# Patient Record
Sex: MALE | Race: WHITE
[De-identification: names, ages, dates, MRNs, and addresses within clinical notes are randomized per-mention and may not be internally consistent; named-entity substitution may affect disease eponyms.]

---

## 2017-07-25 ENCOUNTER — HOSPITAL ENCOUNTER (EMERGENCY)
Dept: HOSPITAL 47 - EC | Age: 67
Discharge: HOME | End: 2017-07-25
Payer: MEDICARE

## 2017-07-25 VITALS — DIASTOLIC BLOOD PRESSURE: 67 MMHG | HEART RATE: 52 BPM | TEMPERATURE: 97.9 F | SYSTOLIC BLOOD PRESSURE: 105 MMHG

## 2017-07-25 VITALS — RESPIRATION RATE: 18 BRPM

## 2017-07-25 DIAGNOSIS — R61: ICD-10-CM

## 2017-07-25 DIAGNOSIS — R07.81: ICD-10-CM

## 2017-07-25 DIAGNOSIS — M25.552: Primary | ICD-10-CM
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
## 2017-07-25 DIAGNOSIS — M47.812: ICD-10-CM
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
## 2017-07-25 DIAGNOSIS — R09.89: ICD-10-CM

## 2017-07-25 DIAGNOSIS — R50.9: ICD-10-CM

## 2017-07-25 DIAGNOSIS — F17.200: ICD-10-CM

## 2017-07-25 DIAGNOSIS — R05: ICD-10-CM

## 2017-07-25 LAB
ALP SERPL-CCNC: 54 U/L (ref 38–126)
ALT SERPL-CCNC: 33 U/L (ref 21–72)
ANION GAP SERPL CALC-SCNC: 9 MMOL/L
AST SERPL-CCNC: 26 U/L (ref 17–59)
BASOPHILS # BLD AUTO: 0 K/UL (ref 0–0.2)
BASOPHILS NFR BLD AUTO: 0 %
BUN SERPL-SCNC: 14 MG/DL (ref 9–20)
CALCIUM SPEC-MCNC: 7.9 MG/DL (ref 8.4–10.2)
CH: 38.3
CHCM: 32.1
CHLORIDE SERPL-SCNC: 105 MMOL/L (ref 98–107)
CO2 SERPL-SCNC: 23 MMOL/L (ref 22–30)
EOSINOPHIL # BLD AUTO: 0.1 K/UL (ref 0–0.7)
EOSINOPHIL NFR BLD AUTO: 2 %
ERYTHROCYTE [DISTWIDTH] IN BLOOD BY AUTOMATED COUNT: 1.91 M/UL (ref 4.3–5.9)
ERYTHROCYTE [DISTWIDTH] IN BLOOD: 20.8 % (ref 11.5–15.5)
GLUCOSE SERPL-MCNC: 103 MG/DL (ref 74–99)
HCT VFR BLD AUTO: 23 % (ref 39–53)
HDW: 2.93
HGB BLD-MCNC: 7.5 GM/DL (ref 13–17.5)
KETONES UR QL STRIP.AUTO: (no result)
LUC NFR BLD AUTO: 1 %
LYMPHOCYTES # SPEC AUTO: 1.1 K/UL (ref 1–4.8)
LYMPHOCYTES NFR SPEC AUTO: 15 %
MCH RBC QN AUTO: 39 PG (ref 25–35)
MCHC RBC AUTO-ENTMCNC: 32.5 G/DL (ref 31–37)
MCV RBC AUTO: 120.1 FL (ref 80–100)
MONOCYTES # BLD AUTO: 0.3 K/UL (ref 0–1)
MONOCYTES NFR BLD AUTO: 4 %
NEUTROPHILS # BLD AUTO: 5.6 K/UL (ref 1.3–7.7)
NEUTROPHILS NFR BLD AUTO: 77 %
NON-AFRICAN AMERICAN GFR(MDRD): >60
PH UR: 5 [PH] (ref 5–8)
POTASSIUM SERPL-SCNC: 4.3 MMOL/L (ref 3.5–5.1)
PROT SERPL-MCNC: 6.2 G/DL (ref 6.3–8.2)
SODIUM SERPL-SCNC: 137 MMOL/L (ref 137–145)
SP GR UR: 1.01 (ref 1–1.03)
UA BILLING (MACRO VS. MICRO): (no result)
UROBILINOGEN UR QL STRIP: <2 MG/DL (ref ?–2)
WBC # BLD AUTO: 0.1 10*3/UL
WBC # BLD AUTO: 7.2 K/UL (ref 3.8–10.6)
WBC (PEROX): 7.2

## 2017-07-25 PROCEDURE — 96361 HYDRATE IV INFUSION ADD-ON: CPT

## 2017-07-25 PROCEDURE — 36415 COLL VENOUS BLD VENIPUNCTURE: CPT

## 2017-07-25 PROCEDURE — 81003 URINALYSIS AUTO W/O SCOPE: CPT

## 2017-07-25 PROCEDURE — 96374 THER/PROPH/DIAG INJ IV PUSH: CPT

## 2017-07-25 PROCEDURE — 96375 TX/PRO/DX INJ NEW DRUG ADDON: CPT

## 2017-07-25 PROCEDURE — 96372 THER/PROPH/DIAG INJ SC/IM: CPT

## 2017-07-25 PROCEDURE — 72050 X-RAY EXAM NECK SPINE 4/5VWS: CPT

## 2017-07-25 PROCEDURE — 73502 X-RAY EXAM HIP UNI 2-3 VIEWS: CPT

## 2017-07-25 PROCEDURE — 85025 COMPLETE CBC W/AUTO DIFF WBC: CPT

## 2017-07-25 PROCEDURE — 87086 URINE CULTURE/COLONY COUNT: CPT

## 2017-07-25 PROCEDURE — 99285 EMERGENCY DEPT VISIT HI MDM: CPT

## 2017-07-25 PROCEDURE — 71101 X-RAY EXAM UNILAT RIBS/CHEST: CPT

## 2017-07-25 PROCEDURE — 70450 CT HEAD/BRAIN W/O DYE: CPT

## 2017-07-25 PROCEDURE — 80053 COMPREHEN METABOLIC PANEL: CPT

## 2017-07-25 PROCEDURE — 72170 X-RAY EXAM OF PELVIS: CPT

## 2017-07-25 PROCEDURE — 99284 EMERGENCY DEPT VISIT MOD MDM: CPT

## 2017-07-25 NOTE — XR
EXAMINATION TYPE: XR ribs LT w pa chest xray

 

DATE OF EXAM: 7/25/2017

 

COMPARISON: NONE

 

HISTORY: Pain after injury

 

TECHNIQUE: 6 views

 

FINDINGS: The study is negative for fracture or pneumothorax or pleural effusion. No incidental findi
ngs.

 

IMPRESSION: No acute process.

## 2017-07-25 NOTE — ED
Fever HPI





- General


Stated Complaint: Fall


Time Seen by Provider: 07/25/17 19:41





- History of Present Illness


Initial Comments: 





Chief complaint and history of present illness this is a 67-year-old male 

brought emergency room by embolus.  The patient was at home he was on a ladder 

he fell approximately 6 feet onto his left side area.  Complains discomfort to 

the left lower lateral rib cage and his left hip pelvic region.  Denies any 

loss of consciousness.  Patient had his cervical collar removed because it was 

irritating him palpation in the neck was negative for pain patient demonstrated 

there was able to flex and extend without discomfort.  The patient also was 

found to have a temperature of 100 with a productive cough.





- Related Data


 Home Medications











 Medication  Instructions  Recorded  Confirmed


 


Unknown Gout Medication 1 tab PO DAILY PRN 07/25/17 07/25/17








 Previous Rx's











 Medication  Instructions  Recorded


 


Hydrocodone/Acetaminophen [Norco 1 each PO Q6HR PRN #10 tab 07/25/17





5-325]  











 Allergies











Allergy/AdvReac Type Severity Reaction Status Date / Time


 


No Known Allergies Allergy   Verified 07/25/17 19:50














Review of Systems


ROS Statement: 


Those systems with pertinent positive or pertinent negative responses have been 

documented in the HPI.


Review of systems no visual acuity changes no headache or neck pain.  Denies 

collarbone pain or shoulder pain.  He has discomfort to his left lower lateral 

rib cage.  Also productive cough.  Temperature today is 100.  No abdominal 

pain.  He has discomfort to the left groin area.  He keeps his left leg flexed 

at the hip.  No neuro deficits.  All systems are reviewed.  Past medical 

problems denies any.  Denies any surgeries of the right foot surgery for 

partial activation.  Family history mother had cancer of unknown type.  Patient 

does smoke does drink denies any ALLERGIES.








ROS Other: All systems not noted in ROS Statement are negative.





Past Medical History


Past Medical History: Skin Disorder


Additional Past Medical History / Comment(s): Gout, cellulitis left leg-on rx 

and clearing up, anemia,


History of Any Multi-Drug Resistant Organisms: None Reported


Past Surgical History: No Surgical Hx Reported


Additional Past Surgical History / Comment(s): surgery on rt leg and foot after 

injury age 3


Past Anesthesia/Blood Transfusion Reactions: No Reported Reaction


Past Psychological History: No Psychological Hx Reported


Smoking Status: Current every day smoker


Past Alcohol Use History: Daily


Additional Past Alcohol Use History / Comment(s): has smoked for approx 50 yrs- 

2 PPD about per wife


Past Drug Use History: None Reported





- Past Family History


  ** Mother


Family Medical History: Cancer





  ** Brother(s)


Family Medical History: Cancer





General Exam





- General Exam Comments


Initial Comments: 





General:


The patient is awake and alert, arrived via EMS after falling approximately 6 

feet.  No loss of consciousness.  He is not on blood thinners.  The patient 

complains of pain to his left lower lateral rib cage and left hip area.  Temp 

warm 1.2 pulse 75 respiratory rate 18 pulse ox 95% room air blood pressure 136/

64.


Eye:


Pupils are equal, round and reactive to light, extra-ocular movements are intact

; there is normal conjunctiva bilaterally. No signs of icterus. 


Ears, nose, mouth and throat:


There are moist mucous membranes and no oral lesions.  Poor dentition


Neck:


The neck is supple, there is no tenderness.


Cardiovascular:


There is a regular rate and rhythm. No murmur, rub or gallop is appreciated.


Respiratory:


Patient has a fever productive cough starting today.  Patient's heavy smoker.  

Rales appreciated..  Left lateral lower chest wall pain with movement palpation 

and breathing.


Gastrointestinal:


Soft, non-distended, non-tender abdomen without masses or organomegaly noted. 

There is no rebound or guarding present. No CVA tenderness. Bowel sounds are 

unremarkable.


Back:


There is no tenderness to palpation in the midline. There is no obvious 

deformity. No rashes noted. 


Musculoskeletal:


Normal ROM, no tenderness, There is no pedal edema. There is no calf tenderness 

or swelling. Sensation intact. Pulses equal bilaterally 2+.  Partial dictation 

right foot


Neurological:


CN II-XII intact, There are no obvious motor or sensory deficits. Coordination 

appears grossly intact. Speech is normal.  No focal or lateralizing findings


Skin:


Skin is warm and dry and no rashes or lesions are noted. 


 





Course


 Vital Signs











  07/25/17 07/25/17 07/25/17





  19:39 20:53 21:17


 


Temperature 101.2 F H 99.2 F 


 


Pulse Rate 75 62 58 L


 


Respiratory 18 18 18





Rate   


 


Blood Pressure 136/64 128/60 148/72


 


O2 Sat by Pulse 95 97 93 L





Oximetry   














  07/25/17





  22:56


 


Temperature 97.9 F


 


Pulse Rate 52 L


 


Respiratory 18





Rate 


 


Blood Pressure 105/67


 


O2 Sat by Pulse 99





Oximetry 














Medical Decision Making





- Medical Decision Making





Medical decision making;





X-rays of cervical spine were done and reviewed radiologist his findings are 

negative for fracture or malalignment.  Prominent multilevel cervical 

spondylosis changes appreciated.  Impression; no acute process.  As read by Dr. Master Ba





X-ray of the left hip was done and reviewed by radiologist his findings are 

bones and joints and soft tissues are unremarkable.  Impression no acute 

process.  As read by Dr. Master Ba





X-ray of the pelvis was done and reviewed by radiologist his impression is 

bones and joints and soft tissues appear negative for acute injury.  Impression 

no acute process.  As read by Dr. Master Ba





X-ray of the chest and left rib series were done and reviewed by radiologist 

his findings are the study is negative for fracture or pneumothorax or pleural 

effusion.  No incidental findings.  Impression no acute process.  As read by 

Dr. Master Ba





CT the brain was done and reviewed by radiologist his findings are there is no 

acute intracranial hemorrhage, mass effect, or midline shift identified.  The 

ventricles and sulci are within normal limits in size.  The globes are intact 

and the visualized sinuses are clear.  Impression no acute process.  As read by 

Dr. Master Ba








Patient became mildly diaphoretic after IV Dilaudid.  He states she's had a 

before he had no reaction to the morphine initially.  He was given Zofran.





Patient states he is feeling better.  He is able to rotate his hip but with   

discomfort.  The patient received 1 L of fluid so far, he'll receive another 

half liter.








The patient's labs show white count 7.2 hemoglobin 7.5 hematocrit 23 and 

platelets 255.  Potassium 4.3.  BUN 14 creatinine 0.8 GFR greater than 60.  

Glucose 103.  The patient was surprised to hear his hemoglobin was low denies 

any source of bleeding.  Hemoccult will be sent.





I discussed with the patient has hemoglobin of 7.5.  Family reports they've 

been to an oncologist for some problems with hemochromatosis.  He'll follow-up.

  I did a rectal exam prostate felt normal.  There was no stool in the vault to 

check for stool guaiac.  Patient denies seeing any dark or black or red stool 

per rectum.





- Lab Data


Result diagrams: 


 07/25/17 22:14





 07/25/17 22:14


 Lab Results











  07/25/17 07/25/17 Range/Units





  22:14 22:14 


 


WBC  7.2   (3.8-10.6)  k/uL


 


RBC  1.91 L   (4.30-5.90)  m/uL


 


Hgb  7.5 L   (13.0-17.5)  gm/dL


 


Hct  23.0 L   (39.0-53.0)  %


 


MCV  120.1 H   (80.0-100.0)  fL


 


MCH  39.0 H   (25.0-35.0)  pg


 


MCHC  32.5   (31.0-37.0)  g/dL


 


RDW  20.8 H   (11.5-15.5)  %


 


Plt Count  255   (150-450)  k/uL


 


Neutrophils %  77   %


 


Lymphocytes %  15   %


 


Monocytes %  4   %


 


Eosinophils %  2   %


 


Basophils %  0   %


 


Neutrophils #  5.6   (1.3-7.7)  k/uL


 


Lymphocytes #  1.1   (1.0-4.8)  k/uL


 


Monocytes #  0.3   (0-1.0)  k/uL


 


Eosinophils #  0.1   (0-0.7)  k/uL


 


Basophils #  0.0   (0-0.2)  k/uL


 


Manual Slide Review  Performed   


 


Hypochromasia  Slight   


 


Anisocytosis  Moderate   


 


Macrocytosis  Marked   


 


Target Cells  Present   


 


Tear Drop Cells  Present   


 


Ovalocytes  Present   


 


Fragmented RBCs  Present   


 


Sodium   137  (137-145)  mmol/L


 


Potassium   4.3  (3.5-5.1)  mmol/L


 


Chloride   105  ()  mmol/L


 


Carbon Dioxide   23  (22-30)  mmol/L


 


Anion Gap   9  mmol/L


 


BUN   14  (9-20)  mg/dL


 


Creatinine   0.80  (0.66-1.25)  mg/dL


 


Est GFR (MDRD) Af Amer   >60  (>60 ml/min/1.73 sqM)  


 


Est GFR (MDRD) Non-Af   >60  (>60 ml/min/1.73 sqM)  


 


Glucose   103 H  (74-99)  mg/dL


 


Calcium   7.9 L  (8.4-10.2)  mg/dL


 


Total Bilirubin   1.1  (0.2-1.3)  mg/dL


 


AST   26  (17-59)  U/L


 


ALT   33  (21-72)  U/L


 


Alkaline Phosphatase   54  ()  U/L


 


Total Protein   6.2 L  (6.3-8.2)  g/dL


 


Albumin   3.3 L  (3.5-5.0)  g/dL














Disposition


Clinical Impression: 


 Fall





Disposition: HOME SELF-CARE


Condition: Stable


Instructions:  Fall Prevention for Older Adults (ED), Rib Contusion (ED), 

Contusion in Adults (ED), Hip Contusion (ED)


Additional Instructions: 


Take Tylenol and/or Norco for pain.  May need repeat x-rays if pain persists 

for a week to 10 days.  Follow-up with your oncologist near family doctor 

concerning here blood picture.


Prescriptions: 


Hydrocodone/Acetaminophen [Norco 5-325] 1 each PO Q6HR PRN #10 tab


 PRN Reason: Pain


Referrals: 


Garcia Luu DO [Primary Care Provider] - 1-2 days


Time of Disposition: 23:23

## 2017-07-25 NOTE — XR
EXAMINATION TYPE: XR cervical spine comp

 

DATE OF EXAM: 7/25/2017

 

COMPARISON: NONE

 

HISTORY: Pain after injury

 

TECHNIQUE: 5 views

 

FINDINGS: Negative for fracture or malalignment. Prominent multilevel cervical spondylosis changes ap
preciated

 

IMPRESSION: No acute process.

## 2017-07-25 NOTE — CT
EXAMINATION TYPE: CT brain wo con

 

DATE OF EXAM: 7/25/2017

 

COMPARISON: NONE

 

HISTORY: Fall injury today.

 

CT DLP: 1010.6 mGycm

Automated exposure control for dose reduction was used.

 

FINDINGS: 

There is no acute intracranial hemorrhage, mass effect, or midline shift identified.  The ventricles 
and sulci are within normal limits in size.  The globes are intact and the visualized sinuses are maite
ar.

 

IMPRESSION: 

NO ACUTE PROCESS.

## 2017-07-25 NOTE — XR
EXAMINATION TYPE: XR pelvis AP view

 

DATE OF EXAM: 7/25/2017

 

COMPARISON: NONE

 

HISTORY: Pain after injury

 

TECHNIQUE: One view

 

FINDINGS: Bones and joints and soft tissues appear negative for acute injury.

 

IMPRESSION: No acute process.

## 2017-07-25 NOTE — XR
EXAMINATION TYPE: XR Hip Complete LT

 

DATE OF EXAM: 7/25/2017

 

COMPARISON: NONE

 

HISTORY: Pain after injury

 

TECHNIQUE: 2 views

 

FINDINGS: Bones and joints and soft tissues are unremarkable.

 

IMPRESSION: No acute process.

## 2020-03-19 ENCOUNTER — HOSPITAL ENCOUNTER (OUTPATIENT)
Dept: HOSPITAL 47 - RADCTMAIN | Age: 70
Discharge: HOME | End: 2020-03-19
Attending: FAMILY MEDICINE
Payer: MEDICARE

## 2020-03-19 DIAGNOSIS — G93.2: ICD-10-CM

## 2020-03-19 DIAGNOSIS — R90.89: ICD-10-CM

## 2020-03-19 DIAGNOSIS — G31.1: Primary | ICD-10-CM

## 2020-03-19 DIAGNOSIS — R42: ICD-10-CM

## 2020-03-19 LAB — BUN SERPL-SCNC: 18 MG/DL (ref 9–20)

## 2020-03-19 PROCEDURE — 84520 ASSAY OF UREA NITROGEN: CPT

## 2020-03-19 PROCEDURE — 36415 COLL VENOUS BLD VENIPUNCTURE: CPT

## 2020-03-19 PROCEDURE — 82565 ASSAY OF CREATININE: CPT

## 2020-03-19 PROCEDURE — 70470 CT HEAD/BRAIN W/O & W/DYE: CPT

## 2020-03-19 NOTE — CT
EXAMINATION TYPE: CT brain wo/w con

 

DATE OF EXAM: 3/19/2020

 

COMPARISON: CT brain dated 7/25/2017

 

HISTORY: Dizziness and pounding in eyes. Vertigo.

 

CT DLP: 2128.6 mGycm  Automated Exposure Control for Dose Reduction was Utilized.

 

 

TECHNIQUE: CT scan of the head is performed with IV contrast.,CT scan of the head is performed withou
t and with without and with IV Contrast, patient injected with 100 mL of Isovue 300.

 

FINDINGS:   Noncontrast images show no acute intracranial hemorrhage or midline shift. The ventricles
 and sulci are symmetrically prominent compatible with age-related volume loss. Incidentally noted pa
rtially empty sella turcica. The globes are symmetric. Extraocular muscles are also symmetric. No eng
orgement of the superior ophthalmic veins. Lenses are in place. Postcontrast images show no suspiciou
s enhancing intraparenchymal mass. Scant mucosal thickening of the right maxillary sinus is seen. Rem
aining paranasal sinuses and mastoid air cells are well aerated. Osseous demineralization seen.

 

IMPRESSION:

1. No abnormal intracranial enhancement seen. Age-related cerebral atrophy is present. No evidence of
 severe sinusitis or mastoiditis in this patient with vertigo. Scant mucosal thickening of the right 
maxillary sinus is seen.

2. Partially empty sella turcica, but is commonly seen with age. However MRI could evaluate for other
 evidence of increased intracranial pressure.  Correlate with ophthalmologic exam. MRI could also pro
vide increased sensitivity for underlying white matter change.

## 2020-06-19 ENCOUNTER — HOSPITAL ENCOUNTER (INPATIENT)
Dept: HOSPITAL 47 - EC | Age: 70
LOS: 6 days | Discharge: HOME HEALTH | DRG: 808 | End: 2020-06-25
Attending: INTERNAL MEDICINE | Admitting: INTERNAL MEDICINE
Payer: MEDICARE

## 2020-06-19 VITALS — BODY MASS INDEX: 17.7 KG/M2

## 2020-06-19 DIAGNOSIS — R64: ICD-10-CM

## 2020-06-19 DIAGNOSIS — Z66: ICD-10-CM

## 2020-06-19 DIAGNOSIS — F10.10: ICD-10-CM

## 2020-06-19 DIAGNOSIS — R50.81: ICD-10-CM

## 2020-06-19 DIAGNOSIS — D63.8: ICD-10-CM

## 2020-06-19 DIAGNOSIS — T45.1X5A: ICD-10-CM

## 2020-06-19 DIAGNOSIS — Z20.828: ICD-10-CM

## 2020-06-19 DIAGNOSIS — E44.0: ICD-10-CM

## 2020-06-19 DIAGNOSIS — Z87.39: ICD-10-CM

## 2020-06-19 DIAGNOSIS — Z91.19: ICD-10-CM

## 2020-06-19 DIAGNOSIS — D46.1: ICD-10-CM

## 2020-06-19 DIAGNOSIS — Z98.890: ICD-10-CM

## 2020-06-19 DIAGNOSIS — D61.810: Primary | ICD-10-CM

## 2020-06-19 DIAGNOSIS — F17.210: ICD-10-CM

## 2020-06-19 DIAGNOSIS — J18.9: ICD-10-CM

## 2020-06-19 DIAGNOSIS — Z80.9: ICD-10-CM

## 2020-06-19 DIAGNOSIS — Z51.5: ICD-10-CM

## 2020-06-19 LAB
ALBUMIN SERPL-MCNC: 3.3 G/DL (ref 3.5–5)
ALP SERPL-CCNC: 76 U/L (ref 38–126)
ALT SERPL-CCNC: 46 U/L (ref 4–49)
ANION GAP SERPL CALC-SCNC: 7 MMOL/L
AST SERPL-CCNC: 52 U/L (ref 17–59)
BUN SERPL-SCNC: 18 MG/DL (ref 9–20)
CALCIUM SPEC-MCNC: 8 MG/DL (ref 8.4–10.2)
CHLORIDE SERPL-SCNC: 99 MMOL/L (ref 98–107)
CO2 SERPL-SCNC: 26 MMOL/L (ref 22–30)
ERYTHROCYTE [DISTWIDTH] IN BLOOD BY AUTOMATED COUNT: 2.05 M/UL (ref 4.3–5.9)
ERYTHROCYTE [DISTWIDTH] IN BLOOD: 25.5 % (ref 11.5–15.5)
GLUCOSE SERPL-MCNC: 100 MG/DL (ref 74–99)
HCT VFR BLD AUTO: 21 % (ref 39–53)
HGB BLD-MCNC: 7.3 GM/DL (ref 13–17.5)
MCH RBC QN AUTO: 35.5 PG (ref 25–35)
MCHC RBC AUTO-ENTMCNC: 34.7 G/DL (ref 31–37)
MCV RBC AUTO: 102.4 FL (ref 80–100)
PH UR: 5.5 [PH] (ref 5–8)
PLATELET # BLD AUTO: 16 K/UL (ref 150–450)
POTASSIUM SERPL-SCNC: 4.1 MMOL/L (ref 3.5–5.1)
PROT SERPL-MCNC: 7.5 G/DL (ref 6.3–8.2)
RBC UR QL: 10 /HPF (ref 0–5)
SODIUM SERPL-SCNC: 132 MMOL/L (ref 137–145)
SP GR UR: 1.02 (ref 1–1.03)
UROBILINOGEN UR QL STRIP: 2 MG/DL (ref ?–2)
WBC # BLD AUTO: 0.9 K/UL (ref 3.8–10.6)
WBC # UR AUTO: 1 /HPF (ref 0–5)

## 2020-06-19 PROCEDURE — 86850 RBC ANTIBODY SCREEN: CPT

## 2020-06-19 PROCEDURE — 84145 PROCALCITONIN (PCT): CPT

## 2020-06-19 PROCEDURE — 86900 BLOOD TYPING SEROLOGIC ABO: CPT

## 2020-06-19 PROCEDURE — 96368 THER/DIAG CONCURRENT INF: CPT

## 2020-06-19 PROCEDURE — 85610 PROTHROMBIN TIME: CPT

## 2020-06-19 PROCEDURE — 36415 COLL VENOUS BLD VENIPUNCTURE: CPT

## 2020-06-19 PROCEDURE — 36430 TRANSFUSION BLD/BLD COMPNT: CPT

## 2020-06-19 PROCEDURE — 85730 THROMBOPLASTIN TIME PARTIAL: CPT

## 2020-06-19 PROCEDURE — 71046 X-RAY EXAM CHEST 2 VIEWS: CPT

## 2020-06-19 PROCEDURE — 87040 BLOOD CULTURE FOR BACTERIA: CPT

## 2020-06-19 PROCEDURE — 86140 C-REACTIVE PROTEIN: CPT

## 2020-06-19 PROCEDURE — 93005 ELECTROCARDIOGRAM TRACING: CPT

## 2020-06-19 PROCEDURE — 84100 ASSAY OF PHOSPHORUS: CPT

## 2020-06-19 PROCEDURE — 83605 ASSAY OF LACTIC ACID: CPT

## 2020-06-19 PROCEDURE — 86901 BLOOD TYPING SEROLOGIC RH(D): CPT

## 2020-06-19 PROCEDURE — 99291 CRITICAL CARE FIRST HOUR: CPT

## 2020-06-19 PROCEDURE — 96365 THER/PROPH/DIAG IV INF INIT: CPT

## 2020-06-19 PROCEDURE — 80202 ASSAY OF VANCOMYCIN: CPT

## 2020-06-19 PROCEDURE — 83735 ASSAY OF MAGNESIUM: CPT

## 2020-06-19 PROCEDURE — 87449 NOS EACH ORGANISM AG IA: CPT

## 2020-06-19 PROCEDURE — 96367 TX/PROPH/DG ADDL SEQ IV INF: CPT

## 2020-06-19 PROCEDURE — 86920 COMPATIBILITY TEST SPIN: CPT

## 2020-06-19 PROCEDURE — 80053 COMPREHEN METABOLIC PANEL: CPT

## 2020-06-19 PROCEDURE — 84550 ASSAY OF BLOOD/URIC ACID: CPT

## 2020-06-19 PROCEDURE — 86880 COOMBS TEST DIRECT: CPT

## 2020-06-19 PROCEDURE — 85027 COMPLETE CBC AUTOMATED: CPT

## 2020-06-19 PROCEDURE — 83615 LACTATE (LD) (LDH) ENZYME: CPT

## 2020-06-19 PROCEDURE — 74177 CT ABD & PELVIS W/CONTRAST: CPT

## 2020-06-19 PROCEDURE — 81001 URINALYSIS AUTO W/SCOPE: CPT

## 2020-06-19 PROCEDURE — 85025 COMPLETE CBC W/AUTO DIFF WBC: CPT

## 2020-06-19 PROCEDURE — 82565 ASSAY OF CREATININE: CPT

## 2020-06-19 RX ADMIN — CEFAZOLIN SCH MLS/HR: 330 INJECTION, POWDER, FOR SOLUTION INTRAMUSCULAR; INTRAVENOUS at 21:37

## 2020-06-19 NOTE — XR
EXAMINATION TYPE: XR chest 2V

 

DATE OF EXAM: 6/19/2020

 

COMPARISON: 7/25/2017

 

HISTORY: 70-year-old male with fever, shortness of breath, weakness

 

TECHNIQUE:  PA and lateral views

 

FINDINGS:  

Heart upper limits of normal in size. Some pleural-based calcifications are suggested particularly ov
erlying the hemidiaphragms. Vague patches of hazy densities may relate to additional pleural calcific
ations. Hyperinflation with flattening of the hemidiaphragms. No definite consolidation or pleural ef
fusion.

 

 

IMPRESSION:  

COPD. Pleural calcifications suggesting prior asbestosis exposure. Vague patches of hazy density coul
d represent subtle groundglass infiltrate such as relating to interstitial pneumonitis or atypical pn
eumonias. Clinically correlate.

## 2020-06-19 NOTE — ED
General Adult HPI





- General


Chief complaint: Weakness


Stated complaint: weakness


Time Seen by Provider: 20 19:22


Source: patient, EMS, RN notes reviewed, old records reviewed


Mode of arrival: EMS


Limitations: no limitations





- History of Present Illness


Initial comments: 





70-year-old male presenting for evaluation of fever, generalized weakness.  Ernst guzmán received a transfusion this morning at approximately 9 AM.  He was noted 

to have a fever this afternoon.  Brought to the emergency department for 

evaluation.  Patient is currently receiving chemotherapy for cancer, uncertain 

what type of cancer this patient has periods most recent chemo was approximately

2 weeks ago.  He denies pain complaints.  Denies vomiting or diarrhea.  Denies 

rash.  He states he is short of breath this is been ongoing for some time.  

Denies significant cough.  He is a current smoker.





- Related Data


                                Home Medications











 Medication  Instructions  Recorded  Confirmed


 


No Known Home Medications  20











                                    Allergies











Allergy/AdvReac Type Severity Reaction Status Date / Time


 


No Known Allergies Allergy   Verified 20 20:51














Review of Systems


ROS Statement: 


Those systems with pertinent positive or pertinent negative responses have been 

documented in the HPI.





ROS Other: All systems not noted in ROS Statement are negative.





Past Medical History


Past Medical History: Cancer, Skin Disorder


Additional Past Medical History / Comment(s): Gout, cellulitis left leg-on rx 

and clearing up, anemia,


History of Any Multi-Drug Resistant Organisms: None Reported


Past Surgical History: No Surgical Hx Reported


Additional Past Surgical History / Comment(s): surgery on rt leg and foot after 

injury age 3


Past Anesthesia/Blood Transfusion Reactions: No Reported Reaction


Past Psychological History: No Psychological Hx Reported


Smoking Status: Current every day smoker


Past Alcohol Use History: Daily


Past Drug Use History: None Reported





- Past Family History


  ** Mother


Family Medical History: Cancer





  ** Brother(s)


Family Medical History: Cancer





  ** Father


Additional Family Medical History / Comment(s): Father  at age 93 from old 

age.





General Exam


Limitations: no limitations


General appearance: alert, cachectic


Head exam: Present: atraumatic, normocephalic


Eye exam: Present: normal appearance, PERRL


ENT exam: Present: mucous membranes dry


Neck exam: Present: normal inspection.  Absent: tenderness, meningismus


Respiratory exam: Present: respiratory distress, decreased breath sounds


Cardiovascular Exam: Present: regular rate, normal rhythm


GI/Abdominal exam: Present: soft.  Absent: distended, tenderness


Extremities exam: Present: normal inspection, normal capillary refill.  Absent: 

pedal edema


Neurological exam: Present: alert, oriented X3, CN II-XII intact.  Absent: motor

sensory deficit


Psychiatric exam: Present: normal affect, normal mood


Skin exam: Present: warm, dry, intact.  Absent: cyanosis, diaphoretic





Course


                                   Vital Signs











  20





  19:18 19:45


 


Temperature 102.6 F H 


 


Pulse Rate 65 


 


Respiratory 18 20





Rate  


 


Blood Pressure 135/68 


 


O2 Sat by Pulse 100 





Oximetry  














EKG Findings





- EKG Comments:


EKG Findings:: EKG: Normal sinus rhythm, rate of 66, TN interval 204 over QRS 

duration 92, , no ST segment elevation





Medical Decision Making





- Medical Decision Making





70-year-old male presenting for evaluation of fever, cough, generalized 

weakness.  Workup reveals pancytopenia and profound neutropenia.  Total white 

blood cell count is 900, hemoglobin 7.3, platelets are 16.  He has a normal 

lactic acid.  Urinalysis showing 10 RBCs, chest x-ray concerning for infiltrate 

given the cough and dyspnea he will be treated for both neutropenic fever as 

well as pneumonia.  He is given cefepime, vancomycin, and azithromycin in the 

emergency department.  He will be admitted with both oncology and infectious di

[facility] on consult.  Case is discussed with Dr. Mendoza who will admit.





- Lab Data


Result diagrams: 


                                 20 19:40





                                 20 19:40


                                   Lab Results











  20 Range/Units





  19:40 19:40 19:40 


 


WBC  0.9 L*    (3.8-10.6)  k/uL


 


RBC  2.05 L    (4.30-5.90)  m/uL


 


Hgb  7.3 L    (13.0-17.5)  gm/dL


 


Hct  21.0 L    (39.0-53.0)  %


 


MCV  102.4 H    (80.0-100.0)  fL


 


MCH  35.5 H    (25.0-35.0)  pg


 


MCHC  34.7    (31.0-37.0)  g/dL


 


RDW  25.5 H    (11.5-15.5)  %


 


Plt Count  16 L*    (150-450)  k/uL


 


Neutrophils #  NP    


 


Differential Comment  P    


 


Manual Slide Review  Performed    


 


Hypochromasia (manual)  Present    


 


Anisocytosis  Marked    


 


Macrocytosis  Marked A    


 


Target Cells  Present    


 


Stomatocytes  Present    


 


Sodium   132 L   (137-145)  mmol/L


 


Potassium   4.1   (3.5-5.1)  mmol/L


 


Chloride   99   ()  mmol/L


 


Carbon Dioxide   26   (22-30)  mmol/L


 


Anion Gap   7   mmol/L


 


BUN   18   (9-20)  mg/dL


 


Creatinine   0.83   (0.66-1.25)  mg/dL


 


Est GFR (CKD-EPI)AfAm   >90   (>60 ml/min/1.73 sqM)  


 


Est GFR (CKD-EPI)NonAf   88   (>60 ml/min/1.73 sqM)  


 


Glucose   100 H   (74-99)  mg/dL


 


Plasma Lactic Acid Geoffrey    1.1  (0.7-2.0)  mmol/L


 


Calcium   8.0 L   (8.4-10.2)  mg/dL


 


Total Bilirubin   1.1   (0.2-1.3)  mg/dL


 


AST   52   (17-59)  U/L


 


ALT   46   (4-49)  U/L


 


Alkaline Phosphatase   76   ()  U/L


 


Total Protein   7.5   (6.3-8.2)  g/dL


 


Albumin   3.3 L   (3.5-5.0)  g/dL


 


Urine Color     


 


Urine Appearance     (Clear)  


 


Urine pH     (5.0-8.0)  


 


Ur Specific Gravity     (1.001-1.035)  


 


Urine Protein     (Negative)  


 


Urine Glucose (UA)     (Negative)  


 


Urine Ketones     (Negative)  


 


Urine Blood     (Negative)  


 


Urine Nitrite     (Negative)  


 


Urine Bilirubin     (Negative)  


 


Urine Urobilinogen     (<2.0)  mg/dL


 


Ur Leukocyte Esterase     (Negative)  


 


Urine RBC     (0-5)  /hpf


 


Urine WBC     (0-5)  /hpf


 


Urine Mucus     (None)  /hpf














  20 Range/Units





  20:10 


 


WBC   (3.8-10.6)  k/uL


 


RBC   (4.30-5.90)  m/uL


 


Hgb   (13.0-17.5)  gm/dL


 


Hct   (39.0-53.0)  %


 


MCV   (80.0-100.0)  fL


 


MCH   (25.0-35.0)  pg


 


MCHC   (31.0-37.0)  g/dL


 


RDW   (11.5-15.5)  %


 


Plt Count   (150-450)  k/uL


 


Neutrophils #   


 


Differential Comment   


 


Manual Slide Review   


 


Hypochromasia (manual)   


 


Anisocytosis   


 


Macrocytosis   


 


Target Cells   


 


Stomatocytes   


 


Sodium   (137-145)  mmol/L


 


Potassium   (3.5-5.1)  mmol/L


 


Chloride   ()  mmol/L


 


Carbon Dioxide   (22-30)  mmol/L


 


Anion Gap   mmol/L


 


BUN   (9-20)  mg/dL


 


Creatinine   (0.66-1.25)  mg/dL


 


Est GFR (CKD-EPI)AfAm   (>60 ml/min/1.73 sqM)  


 


Est GFR (CKD-EPI)NonAf   (>60 ml/min/1.73 sqM)  


 


Glucose   (74-99)  mg/dL


 


Plasma Lactic Acid Geoffrey   (0.7-2.0)  mmol/L


 


Calcium   (8.4-10.2)  mg/dL


 


Total Bilirubin   (0.2-1.3)  mg/dL


 


AST   (17-59)  U/L


 


ALT   (4-49)  U/L


 


Alkaline Phosphatase   ()  U/L


 


Total Protein   (6.3-8.2)  g/dL


 


Albumin   (3.5-5.0)  g/dL


 


Urine Color  Yellow  


 


Urine Appearance  Clear  (Clear)  


 


Urine pH  5.5  (5.0-8.0)  


 


Ur Specific Gravity  1.017  (1.001-1.035)  


 


Urine Protein  Trace H  (Negative)  


 


Urine Glucose (UA)  Negative  (Negative)  


 


Urine Ketones  Negative  (Negative)  


 


Urine Blood  Moderate H  (Negative)  


 


Urine Nitrite  Negative  (Negative)  


 


Urine Bilirubin  Negative  (Negative)  


 


Urine Urobilinogen  2.0  (<2.0)  mg/dL


 


Ur Leukocyte Esterase  Negative  (Negative)  


 


Urine RBC  10 H  (0-5)  /hpf


 


Urine WBC  1  (0-5)  /hpf


 


Urine Mucus  Rare H  (None)  /hpf














Critical Care Time


Critical Care Time: Yes


Total Critical Care Time: 35





Disposition


Clinical Impression: 


 Pneumonia, Neutropenic fever





Disposition: ADMITTED AS IP TO THIS [facility]


Condition: Stable


Is patient prescribed a controlled substance at d/c from ED?: No


Referrals: 


None,Stated [REFERRING] - 1-2 days


Decision to Admit Reason: Admit from EC


Decision Date: 20


Decision Time: 21:11

## 2020-06-20 RX ADMIN — CEFAZOLIN SCH MLS/HR: 330 INJECTION, POWDER, FOR SOLUTION INTRAMUSCULAR; INTRAVENOUS at 21:39

## 2020-06-20 RX ADMIN — CEFEPIME HYDROCHLORIDE SCH MLS/HR: 2 INJECTION, POWDER, FOR SOLUTION INTRAVENOUS at 14:29

## 2020-06-20 RX ADMIN — FILGRASTIM-SNDZ SCH MCG: 300 INJECTION, SOLUTION INTRAVENOUS; SUBCUTANEOUS at 17:55

## 2020-06-20 RX ADMIN — CEFEPIME HYDROCHLORIDE SCH MLS/HR: 2 INJECTION, POWDER, FOR SOLUTION INTRAVENOUS at 20:48

## 2020-06-20 RX ADMIN — SODIUM CHLORIDE SCH MLS/HR: 9 INJECTION, SOLUTION INTRAVENOUS at 21:38

## 2020-06-20 RX ADMIN — CEFEPIME HYDROCHLORIDE SCH MLS/HR: 2 INJECTION, POWDER, FOR SOLUTION INTRAVENOUS at 04:25

## 2020-06-20 RX ADMIN — ACETAMINOPHEN PRN MG: 325 TABLET, FILM COATED ORAL at 18:04

## 2020-06-20 RX ADMIN — SODIUM CHLORIDE SCH MLS/HR: 9 INJECTION, SOLUTION INTRAVENOUS at 09:22

## 2020-06-20 RX ADMIN — CEFAZOLIN SCH: 330 INJECTION, POWDER, FOR SOLUTION INTRAMUSCULAR; INTRAVENOUS at 11:51

## 2020-06-20 RX ADMIN — ACETAMINOPHEN PRN MG: 325 TABLET, FILM COATED ORAL at 09:21

## 2020-06-20 NOTE — P.CONS
History of Present Illness





- Reason for Consult


Consult date: 20


pancytopenia


Requesting physician: Yogesh Mendoza





- Chief Complaint


fever





- History of Present Illness





Mr. Newberry is a very pleasant 69 yo male with multiple comorbidities including 

MDS with ringed sideroblasts, following more recently with Dr. Dos Santos and recently 

started on chemotherapy with dacogen, who is here for neutropenic fever.  Work 

up suggestive of pneumonia.  He is on antibiotics and with severe neutropenia, 

pancytopenia due to chemotherapy.





Patient's cancer history is as follows.  He was initially seen for progressive 

anemia, and persistently high MCV. Labs from  revealed a Hgb of 13.3, with 

.4. CRP was mildly elevated at 1.37.On 16, Hgb was 8.6, with MCV 

116.5. Ferritin was 1053, but saturation was normal at 27%. TIBC was low at 246.

CRP was higher at 3.02. Total globulin was elevated at 4.1. Other aspects of his

CBC and CMP were normal.


  He denied any prior h/o blood problems, or transfusions, or any ongoing 

chronic inflammatory condition.


  Additional labs were ordered, which were negative, other than a mild elevation

of light chains, with normal ratio. Repeat iron studies again showed no evidence

of hemochromatosis.


  He had a bone marrow on 16. Prelim report, per my discussion with 

Pathology indicated erythroid hyperplasia, with some megaloblastoid changes, and

increased ring sideroblasts. Flow indicated an aberrant population of blasts ( 

1%). This is most indicative of MDS.


  He denied any f/c/n/v/LAD/joint swelling/obvious inflammation. His ROS is 

otherwise as per HPI and negative out of 10.


20:


   the patient was referred back here as a new consult.  At the time of his 

initial evaluation, on discussion of the pathology, he had indicated that he did

not want any treatment especially chemotherapy.  As hemoglobin was still in a 

safe range, it was recommended that the patient be followed with observation at 

the time.  However he canceled his scheduled appointment in  did not follow-

up in the office subsequently.


  According to physician notes available the patient remained quite noncompliant

and erratic in follow-up with his PCP.  He had presented in 3/20 with complains 

of progressive fatigue, shortness of breath on exertion, and dizziness over the 

past several weeks.  He was found to have a hemoglobin of 6.7 with MCV 99, 

platelets 193 and WBC 4.5 with ANC 1.2 and ALC 2.1.GFR was essentially normal, 

along with other liver enzymes.  Uric acid was elevated at 8.2.  C-reactive 

protein was 9.5.  His iron saturation was 50% with ferritin 930.


  the patient was admitted to [facility] on 3/19/20 and 

received blood transfusion.  Hemoglobin improvement to the 8 range any was s

ubsequently discharged.  He also had a flow cytometry done by his PCP around 

this time which was negative other than some neutropenia.


  the patient had a bone marrow aspiration biopsy repeated on 20.  This 

revealed similar changes with erythroid hyperplasia, and ring sideroblasts 

indicative of MDS.  There was no evidence of transformation to acute leukemia.





telemedicine 20:


  The patient had repeat bone marrow aspiration biopsy with results as noted 

above.  He denied any fevers/chills/nausea/vomiting.  He continues to complain 

of easy fatigability, and shortness of breath on exertion which is stable since 

his visit he denies any obvious bleeding.  No history of any lymph node enla

rgement, or new bone pain.  Appetite is normal.  Review of systems otherwise as 

per HPI and negative out of 10.





20-patient and his wife are here today for Dacogen education.  Pt is 

extremely anxious about doing treatment.  He has no acute physical complaints.





-: C1 of Dacogen








Review of Systems


All systems: negative


Constitutional: Reports as per HPI





Past Medical History


Past Medical History: Cancer, Skin Disorder


Additional Past Medical History / Comment(s): Gout, cellulitis to left leg? 

(patient states he is itchy, some scabs and scarring noted), chronic anemia, 

bone marrow cancer


History of Any Multi-Drug Resistant Organisms: None Reported


Past Surgical History: No Surgical Hx Reported


Additional Past Surgical History / Comment(s): surgery on rt leg and foot after 

injury age 3


Past Anesthesia/Blood Transfusion Reactions: No Reported Reaction


Past Psychological History: No Psychological Hx Reported


Smoking Status: Current every day smoker


Past Alcohol Use History: Daily


Additional Past Alcohol Use History / Comment(s): Patient is a smoker 2 packs 

per day for 50 years.  Patient drinks 4-6 beers per day for many years and cut 

down to 2 beers per day for the past 2 months.  Patient lives at home with his 

wife.


Past Drug Use History: None Reported





- Past Family History


  ** Mother


Family Medical History: Cancer





  ** Brother(s)


Family Medical History: Cancer





  ** Father


Additional Family Medical History / Comment(s): Father  at age 93 from old 

age.





Medications and Allergies


                                Home Medications











 Medication  Instructions  Recorded  Confirmed  Type


 


No Known Home Medications  20 History








                                    Allergies











Allergy/AdvReac Type Severity Reaction Status Date / Time


 


No Known Allergies Allergy   Verified 20 20:51














Physical Exam


Vitals: 


                                   Vital Signs











  Temp Pulse Pulse Resp BP BP BP


 


 20 11:54  98.3 F      


 


 20 07:00  99.9 F H   62  18   116/52 


 


 20 01:00  98.2 F   50 L  20   104/41 


 


 20 23:05  98.4 F   50 L  20    97/40


 


 20 21:30  100.1 F H  53 L   16  97/52  


 


 20 19:45     20   


 


 20 19:18  102.6 F H  65   18  135/68  














  Pulse Ox


 


 20 11:54 


 


 20 07:00  99


 


 20 01:00  100


 


 20 23:05  100


 


 20 21:30  100


 


 20 19:45 


 


 20 19:18  100








                                Intake and Output











 20





 22:59 06:59 14:59


 


Intake Total  600 


 


Balance  600 


 


Intake:   


 


  Intake, IV Titration  600 





  Amount   


 


    Sodium Chloride 0.9% 1,  600 





    000 ml @ 75 mls/hr IV .   





    [id number] [facility] Rx#:[id number]   


 


Other:   


 


  Voiding Method  Toilet 





  Urinal 


 


  # Voids   1


 


  # Bowel Movements   1


 


  Weight 60.328 kg  60.328 kg














Constitutional: No acute distress.


HEENT: Conjunctival pallor. Mucosa moist.


Neck: Neck supple.


Lungs:No respiratory distress.


Heart: Normal rate.  


Abdomen: Soft, nontender, nondistended.


MSK: 4/4 strength in all 4 extremities.


Neuro: Alert and oriented x 3.


Skin: No jaundice.


Psych: Appropriate affect.








Results


CBC & Chem 7: 


                                 20 19:40





                                 20 19:40


Labs: 


                  Abnormal Lab Results - Last 24 Hours (Table)











  20 Range/Units





  19:40 19:40 20:10 


 


WBC  0.9 L*    (3.8-10.6)  k/uL


 


RBC  2.05 L    (4.30-5.90)  m/uL


 


Hgb  7.3 L    (13.0-17.5)  gm/dL


 


Hct  21.0 L    (39.0-53.0)  %


 


MCV  102.4 H    (80.0-100.0)  fL


 


MCH  35.5 H    (25.0-35.0)  pg


 


RDW  25.5 H    (11.5-15.5)  %


 


Plt Count  16 L*    (150-450)  k/uL


 


Macrocytosis  Marked A    


 


Sodium   132 L   (137-145)  mmol/L


 


Glucose   100 H   (74-99)  mg/dL


 


Calcium   8.0 L   (8.4-10.2)  mg/dL


 


Albumin   3.3 L   (3.5-5.0)  g/dL


 


Urine Protein    Trace H  (Negative)  


 


Urine Blood    Moderate H  (Negative)  


 


Urine RBC    10 H  (0-5)  /hpf


 


Urine Mucus    Rare H  (None)  /hpf











Chest x-ray: report reviewed





Assessment and Plan


Assessment: 





1. Pancytopenia due to chemotherapy and underlying MDS


2. Febrile neutropenia


3. Pneumonia


4. MDS with ringed sideroblasts





Plan: 





Mr. Newberry is a very pleasant 69 yo male with multiple comorbidities as listed 

in PMH including MDS with ringed sideroblasts, who was recently started on 

hypomethylating agent by Dr. Dos Santos with dacogen, here for febrile neutropenia.  

Work up suggestive of pneumonia based on hazy infiltrates on CXR.  





1- Antibiotics as per primary team 


2- G-CSF for neutropenia


3- Supportive transfusion for Hgb <7 and plt <15 


4- Hold Dacogen until pt follows up in clinic upon discharge.





Discussed with pt and he is agreeable to the plan.  All of his questions were 

answered.

## 2020-06-20 NOTE — P.HPIM
History of Present Illness


H&P Date: 20


Chief Complaint: Fever 


This is a 70-year-old patient of Dr. Luu with a past medical history of 

anemia, gout, tobacco use and dependence, daily alcohol use.  And cancer.  

Patient is unsure of the type of cancer that he has.  He has been receiving 

chemotherapy and blood transfusions.  He is under care with Dr. Dos Santos.  Patient 

presented yesterday for evaluation of fever and generalized weakness.  Patient a

transfusion yesterday morning at approximately 9 AM.  He was noted to have a 

fever during the afternoon.  Patient states that his most recent chemotherapy 

was approximately 2 weeks ago.  He denies any pain or at this time.  Denies any 

vomiting or diarrhea.  Patient states that he has been short of breath recently 

with any activity.  Denies any cough.  Patient is a current smoker.





At this time patient is resting in bed without any acute distress.  He is still 

unsure of the type of cancer that he has stating that his white blood cells are 

destroyed his red blood cells.  Patient underwent a blood transfusion yesterday 

and was feeling fine and developed a fever during the afternoon.  Blood pressure

116/52 tabs are 99.9, respirations 18, pulse 62, pulse ox a 99% on 2 L via nasal

cannula.  RBC 2.05, hemoglobin 7.3, platelets 16, potassium 4.1, BUN 18, creat

inine 0.83








Review of Systems


Review Of Systems:


Constitutional: No fever, no chills, no night sweats.  No weight change.  No 

weakness, fatigue or lethargy.  No daytime sleepiness.


EENT: No headache.  No blurred vision or double vision, no loss of vision.  No 

loss of Hearing, no ringing in the ears, no dizziness.  No nasal drainage or 

congestion.  No epistaxis.  No sore throat.


Lungs: reports shortness of breath with activity, cough, no sputum production.  

No wheezing.


Cardiovascular: No chest pain, no lower extremity edema.  No palpitations.  No 

paroxysmal nocturnal dyspnea.  No orthopnea.  No lightheadedness or dizziness.  

No syncopal episodes.


Abdominal: no abdominal discomfort.  No nausea, vomiting.  no diarrhea.  No 

constipation.  No bloody or tarry stools.  no loss of appetite.


Genitourinary: No dysuria, increased frequency, urgency.  No urinary retention.


Musculoskeletal: No myalgias.  No muscle weakness, no gait dysfunction, no 

frequent falls.  No back pain.  No neck pain.


Integumentary: No wounds, no lesions.  No rash or pruritus.  No unusual bruis

ing.  No change in hair or nails.


Neurologic: No aphasia. No facial droop. No change in mentation. No head injury.

No headache. No paralysis. No paresthesia.


Psychiatric: No depression.  No anxiety.  No mood swings.


Endocrine: No abnormal blood sugars.  No weight change.  No excessive sweating 

or thirst. 








Past Medical History


Past Medical History: Cancer, Skin Disorder


Additional Past Medical History / Comment(s): Gout, cellulitis to left leg? 

(patient states he is itchy, some scabs and scarring noted), chronic anemia, 

bone marrow cancer


History of Any Multi-Drug Resistant Organisms: None Reported


Past Surgical History: No Surgical Hx Reported


Additional Past Surgical History / Comment(s): surgery on rt leg and foot after 

injury age 3


Past Anesthesia/Blood Transfusion Reactions: No Reported Reaction


Past Psychological History: No Psychological Hx Reported


Smoking Status: Current every day smoker


Past Alcohol Use History: Daily


Additional Past Alcohol Use History / Comment(s): Patient is a smoker 2 packs 

per day for 50 years.  Patient drinks 4-6 beers per day for many years and cut 

down to 2 beers per day for the past 2 months.  Patient lives at home with his 

wife.


Past Drug Use History: None Reported





- Past Family History


  ** Mother


Family Medical History: Cancer





  ** Brother(s)


Family Medical History: Cancer





  ** Father


Additional Family Medical History / Comment(s): Father  at age 93 from old 

age.





Medications and Allergies


                                Home Medications











 Medication  Instructions  Recorded  Confirmed  Type


 


No Known Home Medications  20 History








                                    Allergies











Allergy/AdvReac Type Severity Reaction Status Date / Time


 


No Known Allergies Allergy   Verified 20 20:51














Physical Exam


Vitals: 


                                   Vital Signs











  Temp Pulse Pulse Resp BP BP BP


 


 20 07:00  99.9 F H   62  18   116/52 


 


 20 01:00  98.2 F   50 L  20   104/41 


 


 20 23:05  98.4 F   50 L  20    97/40


 


 20 21:30  100.1 F H  53 L   16  97/52  


 


 20 19:45     20   


 


 20 19:18  102.6 F H  65   18  135/68  














  Pulse Ox


 


 20 07:00  99


 


 20 01:00  100


 


 20 23:05  100


 


 20 21:30  100


 


 20 19:45 


 


 20 19:18  100








                                Intake and Output











 20





 22:59 06:59 14:59


 


Intake Total  600 


 


Balance  600 


 


Intake:   


 


  Intake, IV Titration  600 





  Amount   


 


    Sodium Chloride 0.9% 1,  600 





    000 ml @ 75 mls/hr IV .   





    [id number] [facility] Rx#:[id number]   


 


Other:   


 


  Voiding Method  Toilet 





  Urinal 


 


  # Voids   1


 


  # Bowel Movements   1


 


  Weight 60.328 kg  











General Appearance: 70-year-old  male Alert, cooperative, no distress, 

appears stated age.


Neck HEENT: Supple, no lymphadenopathy, no thyroid enlargement, no carotid 

bruits.


Lungs: Clear to auscultation without crackles or wheezes no rhonchi, no 

deformity.


Chest Wall: Chest wall normal expansion with deep inspiration no tenderness and 

no deformity was found on exam, no costochondral pain or discomfort.


Heart: Regular rate and rhythm, S1, S2 normal, no murmur, rub or gallop.


Back: Symmetric, no curvature, ROM normal, no CVA tenderness.


Abdomen: Soft, non-tender, no rebound or rigidity, no hepatosplenomegaly.


Extremities: Extremities normal, atraumatic, no cyanosis or edema.


Pulses: 2+ and symmetric.


Skin: Warm to touch Skin color, texture, tugor normal, no rashes or lesions.


Neurologic: Alert oriented x3 cranial nerves II through XII intact, no motor 

deficit, no abnormal balance or gait








Results


CBC & Chem 7: 


                                 20 19:40





                                 20 19:40


Labs: 


                  Abnormal Lab Results - Last 24 Hours (Table)











  20 Range/Units





  19:40 19:40 20:10 


 


WBC  0.9 L*    (3.8-10.6)  k/uL


 


RBC  2.05 L    (4.30-5.90)  m/uL


 


Hgb  7.3 L    (13.0-17.5)  gm/dL


 


Hct  21.0 L    (39.0-53.0)  %


 


MCV  102.4 H    (80.0-100.0)  fL


 


MCH  35.5 H    (25.0-35.0)  pg


 


RDW  25.5 H    (11.5-15.5)  %


 


Plt Count  16 L*    (150-450)  k/uL


 


Macrocytosis  Marked A    


 


Sodium   132 L   (137-145)  mmol/L


 


Glucose   100 H   (74-99)  mg/dL


 


Calcium   8.0 L   (8.4-10.2)  mg/dL


 


Albumin   3.3 L   (3.5-5.0)  g/dL


 


Urine Protein    Trace H  (Negative)  


 


Urine Blood    Moderate H  (Negative)  


 


Urine RBC    10 H  (0-5)  /hpf


 


Urine Mucus    Rare H  (None)  /hpf














Thrombosis Risk Factor Assmnt





- Choose All That Apply


Any of the Below Risk Factors Present?: No


Each Risk Factor Represents 2 Points: Age 61-74 years


Other congenital or acquired thrombophilia - If yes, enter type in comment: No


Thrombosis Risk Factor Assessment Total Risk Factor Score: 2


Thrombosis Risk Factor Assessment Level: Low Risk





Assessment and Plan


Plan: 


1.  Neutropenic fever.  Vancomycin 1 g every 12 hours, cefepime 2 g every 8 

hours Tylenol as needed for fever control, consult oncology, may Neupogen, 

awaiting recommendations from oncology, consult infectious disease, awaiting 

blood cultures, awaiting covid 19 testing





2.  Pancytopenia.  See above





3.  Chronic anemia.  See above





4.  Tobacco use and dependence





5.  Alcohol abuse, stable





6.  GI prophylaxis.  Pantoprazole 40 mg IV daily





7.  DVT prophylaxis.  Ambulation





8.  Covid test pending





Admit for a minimal of 2 nights day





Impression and plan of care have been directed as dictated by the signing 

physician.  Jemma Pittman nurse practitioner acting as scribe for signing 

physician.

## 2020-06-21 LAB
ALBUMIN SERPL-MCNC: 2.8 G/DL (ref 3.5–5)
ALP SERPL-CCNC: 61 U/L (ref 38–126)
ALT SERPL-CCNC: 41 U/L (ref 4–49)
ANION GAP SERPL CALC-SCNC: 5 MMOL/L
AST SERPL-CCNC: 45 U/L (ref 17–59)
BUN SERPL-SCNC: 14 MG/DL (ref 9–20)
CALCIUM SPEC-MCNC: 7.6 MG/DL (ref 8.4–10.2)
CHLORIDE SERPL-SCNC: 106 MMOL/L (ref 98–107)
CO2 SERPL-SCNC: 23 MMOL/L (ref 22–30)
ERYTHROCYTE [DISTWIDTH] IN BLOOD BY AUTOMATED COUNT: 1.89 M/UL (ref 4.3–5.9)
ERYTHROCYTE [DISTWIDTH] IN BLOOD: 25.4 % (ref 11.5–15.5)
GLUCOSE SERPL-MCNC: 102 MG/DL (ref 74–99)
HCT VFR BLD AUTO: 20 % (ref 39–53)
HGB BLD-MCNC: 6.4 GM/DL (ref 13–17.5)
MCH RBC QN AUTO: 33.7 PG (ref 25–35)
MCHC RBC AUTO-ENTMCNC: 31.9 G/DL (ref 31–37)
MCV RBC AUTO: 105.6 FL (ref 80–100)
PLATELET # BLD AUTO: 14 K/UL (ref 150–450)
POTASSIUM SERPL-SCNC: 4 MMOL/L (ref 3.5–5.1)
PROT SERPL-MCNC: 6.8 G/DL (ref 6.3–8.2)
SODIUM SERPL-SCNC: 134 MMOL/L (ref 137–145)
WBC # BLD AUTO: 0.8 K/UL (ref 3.8–10.6)

## 2020-06-21 PROCEDURE — 30233N1 TRANSFUSION OF NONAUTOLOGOUS RED BLOOD CELLS INTO PERIPHERAL VEIN, PERCUTANEOUS APPROACH: ICD-10-PCS

## 2020-06-21 RX ADMIN — CEFEPIME HYDROCHLORIDE SCH MLS/HR: 2 INJECTION, POWDER, FOR SOLUTION INTRAVENOUS at 04:50

## 2020-06-21 RX ADMIN — CEFEPIME HYDROCHLORIDE SCH MLS/HR: 2 INJECTION, POWDER, FOR SOLUTION INTRAVENOUS at 20:12

## 2020-06-21 RX ADMIN — FILGRASTIM-SNDZ SCH MCG: 300 INJECTION, SOLUTION INTRAVENOUS; SUBCUTANEOUS at 19:21

## 2020-06-21 RX ADMIN — CEFEPIME HYDROCHLORIDE SCH MLS/HR: 2 INJECTION, POWDER, FOR SOLUTION INTRAVENOUS at 12:26

## 2020-06-21 RX ADMIN — PANTOPRAZOLE SODIUM SCH MG: 40 INJECTION, POWDER, FOR SOLUTION INTRAVENOUS at 09:30

## 2020-06-21 RX ADMIN — SODIUM CHLORIDE SCH MLS/HR: 9 INJECTION, SOLUTION INTRAVENOUS at 09:30

## 2020-06-21 RX ADMIN — SODIUM CHLORIDE SCH MLS/HR: 9 INJECTION, SOLUTION INTRAVENOUS at 21:30

## 2020-06-21 RX ADMIN — ACETAMINOPHEN PRN MG: 325 TABLET, FILM COATED ORAL at 15:41

## 2020-06-21 RX ADMIN — CEFAZOLIN SCH: 330 INJECTION, POWDER, FOR SOLUTION INTRAMUSCULAR; INTRAVENOUS at 14:47

## 2020-06-21 RX ADMIN — ACETAMINOPHEN PRN MG: 325 TABLET, FILM COATED ORAL at 05:13

## 2020-06-21 NOTE — P.CONS
History of Present Illness





- Reason for Consult


Consult date: 20


Neutropenic fever


Requesting physician: Yogesh Mendoza





- Chief Complaint


Fever 1 day





- History of Present Illness


Patient is a 70-year-old  male with a past medical history significant 

for myelodysplastic syndrome for the patient is currently undergoing 

chemotherapy in the hospital has been about 2 weeks ago patient did receive 

blood transfusion the day of presentation hospital in the afternoon patient 

noticed to have a fever with some chills the patient has been complaining of 

feeling weak and no energy but denies having any headache or URI symptoms no 

chest pain or shortness did have mild cough not bringing up any sputum no nausea

no vomiting no bone pain no diarrhea no urinary symptoms.  The symptoms the 

patient has been evaluated by the ER physician on arrival.  The patient did have

a fever of 102F patient was noticed to be neutropenic with a white count of 

0.9, the patient did have a negative UA chest x-ray was some hazy infiltrate, 

covid 19 Negative testing has been negative she has been started on cefepime and

vancomycin admitted to the hospital infectious disease was consulted for further

management of antibiotic therapy








Review of Systems





Positive point has been  mentioned in the HPI rest of the systems are negative





Past Medical History


Past Medical History: Cancer, Skin Disorder


Additional Past Medical History / Comment(s): Gout, cellulitis to left leg? 

(patient states he is itchy, some scabs and scarring noted), chronic anemia, 

bone marrow cancer


History of Any Multi-Drug Resistant Organisms: None Reported


Past Surgical History: No Surgical Hx Reported


Additional Past Surgical History / Comment(s): surgery on rt leg and foot after 

injury age 3


Past Anesthesia/Blood Transfusion Reactions: No Reported Reaction


Past Psychological History: No Psychological Hx Reported


Smoking Status: Current every day smoker


Past Alcohol Use History: Daily


Additional Past Alcohol Use History / Comment(s): Patient is a smoker 2 packs 

per day for 50 years.  Patient drinks 4-6 beers per day for many years and cut 

down to 2 beers per day for the past 2 months.  Patient lives at home with his 

wife.


Past Drug Use History: None Reported





- Past Family History


  ** Mother


Family Medical History: Cancer





  ** Brother(s)


Family Medical History: Cancer





  ** Father


Additional Family Medical History / Comment(s): Father  at age 93 from old 

age.





Medications and Allergies


                                Home Medications











 Medication  Instructions  Recorded  Confirmed  Type


 


No Known Home Medications  20 History








                                    Allergies











Allergy/AdvReac Type Severity Reaction Status Date / Time


 


No Known Allergies Allergy   Verified 20 20:51














Physical Exam


Vitals: 


                                   Vital Signs











  Temp Pulse Pulse Resp BP BP BP


 


 20 11:54  98.3 F      


 


 20 07:00  99.9 F H   62  18   116/52 


 


 20 01:00  98.2 F   50 L  20   104/41 


 


 20 23:05  98.4 F   50 L  20    97/40


 


 20 21:30  100.1 F H  53 L   16  97/52  


 


 20 19:45     20   


 


 20 19:18  102.6 F H  65   18  135/68  














  Pulse Ox


 


 20 11:54 


 


 20 07:00  99


 


 20 01:00  100


 


 20 23:05  100


 


 20 21:30  100


 


 20 19:45 


 


 20 19:18  100








                                Intake and Output











 20





 22:59 06:59 14:59


 


Intake Total  600 


 


Balance  600 


 


Intake:   


 


  Intake, IV Titration  600 





  Amount   


 


    Sodium Chloride 0.9% 1,  600 





    000 ml @ 75 mls/hr IV .   





    [id number] [facility] Rx#:[id number]   


 


Other:   


 


  Voiding Method  Toilet 





  Urinal 


 


  # Voids   1


 


  # Bowel Movements   1


 


  Weight 60.328 kg  60.328 kg











GENERAL DESCRIPTION: An elderly male lying in bed, no distress. No tachypnea or 

accessory muscle of respiration use.


HEENT: Shows Pallor , no scleral icterus. Oral mucous membrane is dry. No 

pharyngeal erythema or thrush


NECK: Trachea central, no thyromegaly.


LUNGS: Unlabored breathing.  Decreased this on the base. No wheeze or crackle.


HEART: S1, S2, regular rate and rhythm. No loud murmur


ABDOMEN: Soft, no tenderness , guarding or rigidity, no organomegaly


EXTREMITIES: No edema of feet.


SKIN: No rash, no masses palpable.


NEUROLOGICAL: The patient is awake, alert, oriented x3, mood and affect normal.

















Results


CBC & Chem 7: 


                                 20 19:40





                                 20 19:40


Labs: 


                  Abnormal Lab Results - Last 24 Hours (Table)











  20 Range/Units





  19:40 19:40 20:10 


 


WBC  0.9 L*    (3.8-10.6)  k/uL


 


RBC  2.05 L    (4.30-5.90)  m/uL


 


Hgb  7.3 L    (13.0-17.5)  gm/dL


 


Hct  21.0 L    (39.0-53.0)  %


 


MCV  102.4 H    (80.0-100.0)  fL


 


MCH  35.5 H    (25.0-35.0)  pg


 


RDW  25.5 H    (11.5-15.5)  %


 


Plt Count  16 L*    (150-450)  k/uL


 


Macrocytosis  Marked A    


 


Sodium   132 L   (137-145)  mmol/L


 


Glucose   100 H   (74-99)  mg/dL


 


Calcium   8.0 L   (8.4-10.2)  mg/dL


 


Albumin   3.3 L   (3.5-5.0)  g/dL


 


Urine Protein    Trace H  (Negative)  


 


Urine Blood    Moderate H  (Negative)  


 


Urine RBC    10 H  (0-5)  /hpf


 


Urine Mucus    Rare H  (None)  /hpf














Assessment and Plan


Assessment: 


1-patient with febrile neutropenia in this patient who has received chemotherapy

for underlying myelodysplastic syndrome presented to hospital with fever and 

weakness minimal cough in this patient who did have a some hazy infiltrate on 

his chest x-ray with concern for possible pneumonia with a likely source of this

infection is currently no other obvious focus of infection urine has been 

negative abdominal soft on clinical Examination and no evidence of any 

cellulitis





(1) Neutropenic fever


Current Visit: Yes   Status: Acute   Code(s): D70.9 - NEUTROPENIA, UNSPECIFIED; 

R50.81 - FEVER PRESENTING WITH CONDITIONS CLASSIFIED ELSEWHERE   SNOMED Code(s):

317066982


   





(2) Pneumonia


Current Visit: Yes   Status: Acute   Code(s): J18.9 - PNEUMONIA, UNSPECIFIED 

ORGANISM   SNOMED Code(s): 343915129


   


Plan: 


1-we will try to obtain sputum for Gram stain and culture


2-check CRP and pro-calcitonin level


3-cefepime 2 g every 8 hours


We will follow on clinical condition and cultures to further adjust medication 

if needed


Thank you for this consultation will follow this patient with you

## 2020-06-21 NOTE — P.PN
Subjective


Progress Note Date: 06/21/20


This is a 70-year-old patient of Dr. Luu with a past medical history of 

anemia, gout, tobacco use and dependence, daily alcohol use.  And cancer.  

Patient is unsure of the type of cancer that he has.  He has been receiving 

chemotherapy and blood transfusions.  He is under care with Dr. Dos Santos.  Patient 

presented yesterday for evaluation of fever and generalized weakness.  Patient a

transfusion yesterday morning at approximately 9 AM.  He was noted to have a 

fever during the afternoon.  Patient states that his most recent chemotherapy 

was approximately 2 weeks ago.  He denies any pain or at this time.  Denies any 

vomiting or diarrhea.  Patient states that he has been short of breath recently 

with any activity.  Denies any cough.  Patient is a current smoker.





At this time patient is resting in bed without any acute distress.  He is still 

unsure of the type of cancer that he has stating that his white blood cells are 

destroyed his red blood cells.  Patient underwent a blood transfusion yesterday 

and was feeling fine and developed a fever during the afternoon.  Blood pressure

116/52 tabs are 99.9, respirations 18, pulse 62, pulse ox a 99% on 2 L via nasal

cannula.  RBC 2.05, hemoglobin 7.3, platelets 16, potassium 4.1, BUN 18, 

creatinine 0.83





6/21: Patient is resting in bed at this time without any acute distress.  

Patient is found to have myelodysplastic syndrome, continues to be febrile with 

weakness and minimal cough.  Lab results showed to be beefy 0.8, hemoglobin 6.4,

platelets 14, sodium 134, potassium 4.0, BUN 14, creatinine 0.76.  Urine did not

show any signs of infection.  Corona virus PCR was not detected.  Pulse rate 54,

respirations 18, blood pressure 114/54, O2 saturation 100% on 2 L





Review Of Systems:


Constitutional: Reports fever, no chills, no night sweats.  No weight change.  

Reports weakness and fatigue no lethargy.  No daytime sleepiness.


EENT: No headache.  No blurred vision or double vision, no loss of vision.  No 

loss of Hearing, no ringing in the ears, no dizziness.  No nasal drainage or 

congestion.  No epistaxis.  No sore throat.


Lungs: reports shortness of breath with activity, cough, no sputum production.  

No wheezing.


Cardiovascular: No chest pain, no lower extremity edema.  No palpitations.  No 

paroxysmal nocturnal dyspnea.  No orthopnea.  No lightheadedness or dizziness.  

No syncopal episodes.


Abdominal: no abdominal discomfort.  No nausea, vomiting.  no diarrhea.  No 

constipation.  No bloody or tarry stools.  no loss of appetite.


Genitourinary: No dysuria, increased frequency, urgency.  No urinary retention.


Musculoskeletal: No myalgias.  No muscle weakness, no gait dysfunction, no 

frequent falls.  No back pain.  No neck pain.


Integumentary: No wounds, no lesions.  No rash or pruritus.  No unusual 

bruising.  No change in hair or nails.


Neurologic: No aphasia. No facial droop. No change in mentation. No head injury.

No headache. No paralysis. No paresthesia.


Psychiatric: No depression.  No anxiety.  No mood swings.


Endocrine: No abnormal blood sugars.  No weight change.  No excessive sweating 

or thirst. 











Objective





- Vital Signs


Vital signs: 


                                   Vital Signs











Temp  98.5 F   06/21/20 07:00


 


Pulse  79   06/21/20 07:00


 


Resp  18   06/21/20 07:00


 


BP  96/54   06/21/20 07:00


 


Pulse Ox  98   06/21/20 07:00








                                 Intake & Output











 06/20/20 06/21/20 06/21/20





 18:59 06:59 18:59


 


Intake Total  600 


 


Output Total 275 150 


 


Balance -275 450 


 


Weight 60.328 kg  


 


Intake:   


 


  Intake, IV Titration  600 





  Amount   


 


    Sodium Chloride 0.9% 1,  600 





    000 ml @ 75 mls/hr IV .   





    [id number] [facility] Rx#:[id number]   


 


Output:   


 


  Urine 275 150 


 


Other:   


 


  Voiding Method  Toilet 





  Urinal 


 


  # Voids 1  


 


  # Bowel Movements 1  














- Exam


General Appearance: 70-year-old  male Alert, cooperative, no distress, 

appears stated age.


Neck HEENT: Supple, no lymphadenopathy, no thyroid enlargement, no carotid 

bruits.


Lungs: Clear to auscultation without crackles or wheezes no rhonchi, no 

deformity.


Chest Wall: Chest wall normal expansion with deep inspiration no tenderness and 

no deformity was found on exam, no costochondral pain or discomfort.


Heart: Regular rate and rhythm, S1, S2 normal, no murmur, rub or gallop.


Back: Symmetric, no curvature, ROM normal, no CVA tenderness.


Abdomen: Soft, non-tender, no rebound or rigidity, no hepatosplenomegaly.


Extremities: Extremities normal, atraumatic, no cyanosis or edema.


Pulses: 2+ and symmetric.


Skin: Warm to touch Skin color, texture, tugor normal, no rashes or lesions.


Neurologic: Alert oriented x3 cranial nerves II through XII intact, no motor 

deficit, no abnormal balance or gait








- Labs


CBC & Chem 7: 


                                 06/21/20 06:32





                                 06/21/20 06:32


Labs: 


                  Abnormal Lab Results - Last 24 Hours (Table)











  06/21/20 06/21/20 06/21/20 Range/Units





  06:32 06:32 08:59 


 


WBC   0.8 L*   (3.8-10.6)  k/uL


 


RBC   1.89 L   (4.30-5.90)  m/uL


 


Hgb   6.4 L*   (13.0-17.5)  gm/dL


 


Hct   20.0 L   (39.0-53.0)  %


 


MCV   105.6 H   (80.0-100.0)  fL


 


RDW   25.4 H   (11.5-15.5)  %


 


Plt Count   14 L*   (150-450)  k/uL


 


Macrocytosis   Marked A   


 


Sodium  134 L    (137-145)  mmol/L


 


Glucose  102 H    (74-99)  mg/dL


 


Calcium  7.6 L    (8.4-10.2)  mg/dL


 


C-Reactive Protein  83.8 H    (<10.0)  mg/L


 


Albumin  2.8 L    (3.5-5.0)  g/dL


 


Crossmatch    See Detail  








                      Microbiology - Last 24 Hours (Table)











 06/19/20 20:49 Blood Culture - Preliminary





 Blood    No Growth after 24 hours














Assessment and Plan


Plan: 


1.  Neutropenic fever.  Vancomycin 1 g every 12 hours, cefepime 2 g every 8 

hours Tylenol as needed for fever control, consult oncology, G-CSF for 

neutropenia, oncology consult appreciated, infectious disease consult 

appreciated awaiting blood cultures, transfuse 1 unit of packed red blood cells 

for hemoglobin 6.4





2.  Pancytopenia.  See above





3.  Chronic anemia.  See above





4. MDS with ringed sideroblasts see above





5.  Tobacco use and dependence





6.  Alcohol abuse, stable





7.  GI prophylaxis.  Pantoprazole 40 mg IV daily





8.  DVT prophylaxis.  Ambulation





9.  Covid test negative 





Admit for a minimal of 2 nights day





Impression and plan of care have been directed as dictated by the signing 

physician.  Jemma Pittman nurse practitioner acting as scribe for signing 

physician.

## 2020-06-22 LAB
ALBUMIN SERPL-MCNC: 3.1 G/DL (ref 3.5–5)
ALP SERPL-CCNC: 47 U/L (ref 38–126)
ALT SERPL-CCNC: 37 U/L (ref 4–49)
ANION GAP SERPL CALC-SCNC: 7 MMOL/L
AST SERPL-CCNC: 50 U/L (ref 17–59)
BUN SERPL-SCNC: 14 MG/DL (ref 9–20)
CALCIUM SPEC-MCNC: 7.8 MG/DL (ref 8.4–10.2)
CHLORIDE SERPL-SCNC: 105 MMOL/L (ref 98–107)
CO2 SERPL-SCNC: 25 MMOL/L (ref 22–30)
ERYTHROCYTE [DISTWIDTH] IN BLOOD BY AUTOMATED COUNT: 2.46 M/UL (ref 4.3–5.9)
ERYTHROCYTE [DISTWIDTH] IN BLOOD: 24.7 % (ref 11.5–15.5)
GLUCOSE SERPL-MCNC: 102 MG/DL (ref 74–99)
HCT VFR BLD AUTO: 25.9 % (ref 39–53)
HGB BLD-MCNC: 8 GM/DL (ref 13–17.5)
MCH RBC QN AUTO: 32.4 PG (ref 25–35)
MCHC RBC AUTO-ENTMCNC: 30.8 G/DL (ref 31–37)
MCV RBC AUTO: 105.3 FL (ref 80–100)
PLATELET # BLD AUTO: 31 K/UL (ref 150–450)
POTASSIUM SERPL-SCNC: 4.3 MMOL/L (ref 3.5–5.1)
PROT SERPL-MCNC: 7.3 G/DL (ref 6.3–8.2)
SODIUM SERPL-SCNC: 137 MMOL/L (ref 137–145)
WBC # BLD AUTO: 0.8 K/UL (ref 3.8–10.6)

## 2020-06-22 RX ADMIN — PANTOPRAZOLE SODIUM SCH MG: 40 INJECTION, POWDER, FOR SOLUTION INTRAVENOUS at 09:09

## 2020-06-22 RX ADMIN — CEFAZOLIN SCH: 330 INJECTION, POWDER, FOR SOLUTION INTRAMUSCULAR; INTRAVENOUS at 19:55

## 2020-06-22 RX ADMIN — SODIUM CHLORIDE SCH MLS/HR: 9 INJECTION, SOLUTION INTRAVENOUS at 09:09

## 2020-06-22 RX ADMIN — CEFEPIME HYDROCHLORIDE SCH MLS/HR: 2 INJECTION, POWDER, FOR SOLUTION INTRAVENOUS at 05:42

## 2020-06-22 RX ADMIN — SODIUM CHLORIDE SCH MLS/HR: 9 INJECTION, SOLUTION INTRAVENOUS at 21:55

## 2020-06-22 RX ADMIN — CEFEPIME HYDROCHLORIDE SCH MLS/HR: 2 INJECTION, POWDER, FOR SOLUTION INTRAVENOUS at 20:26

## 2020-06-22 RX ADMIN — CEFAZOLIN SCH MLS/HR: 330 INJECTION, POWDER, FOR SOLUTION INTRAMUSCULAR; INTRAVENOUS at 05:42

## 2020-06-22 RX ADMIN — CEFEPIME HYDROCHLORIDE SCH MLS/HR: 2 INJECTION, POWDER, FOR SOLUTION INTRAVENOUS at 14:39

## 2020-06-22 RX ADMIN — FILGRASTIM-SNDZ SCH MCG: 300 INJECTION, SOLUTION INTRAVENOUS; SUBCUTANEOUS at 20:11

## 2020-06-22 NOTE — PN
PROGRESS NOTE



DATE OF SERVICE:

06/22/2020



REASON FOR FOLLOWUP:

Pneumonia with febrile neutropenia.



INTERVAL HISTORY:

The patient is currently afebrile. The patient is breathing comfortably.  The patient

denies having any chest pain or shortness of breath.  He did have some cough.  No

nausea or vomiting.  No abdominal pain or diarrhea.



PHYSICAL EXAMINATION:

Blood pressure is 115/63 with a pulse of 67, temperature 98.2. He is 97% on room air.

General description is an elderly male lying in bed in no distress.

RESPIRATORY SYSTEM: Unlabored breathing with decreased breath sounds at the base. No

wheeze.

HEART: S1, S2.  Regular rate and rhythm.

ABDOMEN: Soft. No tenderness.



LABS:

Hemoglobin 8, white count 0.8, BUN of 14, creatinine 0.74.



DIAGNOSTIC IMPRESSION AND PLAN:

Patient with febrile neutropenia with concern for pneumonia _____ likely source. The

patient is currently covered with cefepime and vancomycin.  Overall resolution of his

fever. White count is still low.  Will try to obtain a sputum sample to narrow down his

antibiotics.  Continue supportive care.





MMODL / IJN: [id number] / Job#: [id number]

## 2020-06-22 NOTE — PN
PROGRESS NOTE



DATE OF SERVICE:

06/21/2020



REASON FOR FOLLOWUP:

Febrile neutropenia.



INTERVAL HISTORY:

Patient did spike another fever of 102 degrees Fahrenheit this afternoon.  The patient

is afebrile afterwards. The patient complaining of feeling weak and tired.  _____ .No

chest pain or shortness of breath.  Minimal cough.  No nausea, vomiting.  No abdominal

pain or diarrhea.



PHYSICAL EXAMINATION:

Blood pressure 108/48 with a pulse of 60, temperature 98.4, T-max 102.  He is 95% on

room air.

General description is an elderly male lying in bed in no distress.

RESPIRATORY SYSTEM: Unlabored breathing, decreased breath sounds in the base, no

wheeze.

HEART: S1, S2.  Regular rate and rhythm.

ABDOMEN:  Soft, no tenderness.



LABS:

Hemoglobin 6.4 with white count 0.8, creatinine 0.76.  CRP is elevated. Procalcitonin

was ordered, not done. Vancomycin trough is 15.5.  Blood culture has been negative so

far.



DIAGNOSTIC IMPRESSION AND PLAN:

Patient with febrile neutropenia with concern for pneumonia.  Patient is still spiking

a fever despite being on broad-spectrum antibiotic with the x-ray was mostly

interstitial, but we will add Levaquin to the antibiotic regimen. Obtain urine for

Legionella antigen. Will adjust _____on the basis clinical response and culture.

Continue with supportive care.





MMODL / IJN: [id number] / Job#: [id number]

## 2020-06-22 NOTE — P.PN
Subjective


Progress Note Date: 06/22/20


Principal diagnosis: 





MDS, Pancytopenia, Febrile Neutropenia





Patient seen and examined this am. No acute distress. No acute complaints he is 

fatigued. 





Objective





- Vital Signs


Vital signs: 


                                   Vital Signs











Temp  99.5 F   06/22/20 19:25


 


Pulse  78   06/22/20 19:25


 


Resp  18   06/22/20 19:25


 


BP  138/75   06/22/20 19:25


 


Pulse Ox  95   06/22/20 19:25








                                 Intake & Output











 06/22/20 06/22/20 06/23/20





 06:59 18:59 06:59


 


Intake Total 1160  


 


Output Total 250 325 


 


Balance 910 -325 


 


Intake:   


 


  Intake, IV Titration 850  





  Amount   


 


    Sodium Chloride 0.9% 1, 600  





    000 ml @ 75 mls/hr IV .   





    [id number] [facility] Rx#:[id number]   


 


    Vancomycin 1,000 mg In 250  





    Sodium Chloride 0.9% 250   





    ml @ 125 mls/hr IVPB Q12H   





    [facility] Rx#:[id number]   


 


  Blood Product 310  


 


    Rc Irr As1  Unit 310  





    [id number]   


 


Output:   


 


  Urine 250 325 


 


Other:   


 


  Voiding Method Toilet Urinal 





 Urinal  


 


  # Bowel Movements 1  














- Exam





Constitutional: No acute distress.


HEENT: Conjunctival pallor. Mucosa dry, no thrush


Neck: Neck supple.


Lungs:No respiratory distress.


Heart: Normal rate.  


Abdomen: Soft, nontender, nondistended.


MSK: 4/4 strength in all 4 extremities.


Neuro: Alert and oriented x 3. chronically ill appearance


Skin: No jaundice.


Psych: Appropriate affect.





- Labs


CBC & Chem 7: 


                                 06/22/20 09:31





                                 06/22/20 09:31


Labs: 


                  Abnormal Lab Results - Last 24 Hours (Table)











  06/21/20 06/22/20 06/22/20 Range/Units





  06:32 09:31 09:31 


 


WBC   0.8 L*   (3.8-10.6)  k/uL


 


RBC   2.46 L   (4.30-5.90)  m/uL


 


Hgb   8.0 L D   (13.0-17.5)  gm/dL


 


Hct   25.9 L   (39.0-53.0)  %


 


MCV   105.3 H   (80.0-100.0)  fL


 


MCHC   30.8 L   (31.0-37.0)  g/dL


 


RDW   24.7 H   (11.5-15.5)  %


 


Plt Count   31 L D   (150-450)  k/uL


 


Macrocytosis   Marked A   


 


Glucose    102 H  (74-99)  mg/dL


 


Calcium    7.8 L  (8.4-10.2)  mg/dL


 


Albumin    3.1 L  (3.5-5.0)  g/dL


 


Procalcitonin  0.17 H    (0.02-0.09)  ng/mL








                      Microbiology - Last 24 Hours (Table)











 06/19/20 20:49 Blood Culture - Preliminary





 Blood    No Growth after 48 hours














Assessment and Plan


Plan: 





Assessment and Plan








1. Pancytopenia due to chemotherapy and underlying MDS:


 - Monitor CBC Daily and Supportive irradiated transfusions PRN Hemoglobin less 

than 7, Platelets less than 15


 - Continue on G-CSF for neutropenia





2. Febrile neutropenia:


 - Improving T-Max 24 hours 100.4


 - Pan Cultures pending


 - Blood Cultures negative at 24 hours


 - Antibiotics per ID





3. Pneumonia:


 - Work up suggestive of pneumonia based on hazy infiltrates on CXR.  





4. MDS with ringed sideroblasts


 - Dacogen on hold until recovery of current febrile illness





Plan:


 - Monitor Daily CBC, CMP


 - CHeck Coags in am


 - Monitor for worsening signs of infection 


 - Monitor for bleeding. 





Thank you for allowing us to participate in the care of this patient will follow

along with you

## 2020-06-22 NOTE — P.PN
Subjective


Progress Note Date: 06/22/20





This is a 70-year-old patient of Dr. Luu with a past medical history of 

anemia, gout, tobacco use and dependence, daily alcohol use.  And cancer.  

Patient is unsure of the type of cancer that he has.  He has been receiving 

chemotherapy and blood transfusions.  He is under care with Dr. Dos Santos.  Patient 

presented yesterday for evaluation of fever and generalized weakness.  Patient a

transfusion yesterday morning at approximately 9 AM.  He was noted to have a 

fever during the afternoon.  Patient states that his most recent chemotherapy 

was approximately 2 weeks ago.  He denies any pain or at this time.  Denies any 

vomiting or diarrhea.  Patient states that he has been short of breath recently 

with any activity.  Denies any cough.  Patient is a current smoker.





At this time patient is resting in bed without any acute distress.  He is still 

unsure of the type of cancer that he has stating that his white blood cells are 

destroyed his red blood cells.  Patient underwent a blood transfusion yesterday 

and was feeling fine and developed a fever during the afternoon.  Blood pressure

116/52 tabs are 99.9, respirations 18, pulse 62, pulse ox a 99% on 2 L via nasal

cannula.  RBC 2.05, hemoglobin 7.3, platelets 16, potassium 4.1, BUN 18, 

creatinine 0.83





6/21: Patient is resting in bed at this time without any acute distress.  

Patient is found to have myelodysplastic syndrome, continues to be febrile with 

weakness and minimal cough.  Lab results showed to be beefy 0.8, hemoglobin 6.4,

platelets 14, sodium 134, potassium 4.0, BUN 14, creatinine 0.76.  Urine did not

show any signs of infection.  Corona virus PCR was not detected.  Pulse rate 54,

respirations 18, blood pressure 114/54, O2 saturation 100% on 2 L





6/22: Patient is status post 1 unit of packed RBCs with repeat hemoglobin of 8

.0, WBC 0.8, platelet count 31.  Electrolytes and renal function normal.  

Patient has been started on Zarxio.  Patient is also been seen and followed by 

oncology. Patient has been seen by Dr. Hernandez and currently on IV antibiotics in 

form of cefepime and vancomycin, Legionella antigen is pending.  Blood cultures 

no growth at 48 hours.  Has had 2 bowel movements today, one loose and one 

formed.  He is afebrile, heart rate 86, blood pressure 127/68, pulse ox 94% on 

room air.  Discharge plan will be to return home.





Review Of Systems:


Constitutional: Reports fever, no chills, no night sweats.  No weight change.  

Reports weakness and fatigue no lethargy.  No daytime sleepiness.


EENT: No headache.  No blurred vision or double vision, no loss of vision.  No 

loss of Hearing, no ringing in the ears, no dizziness.  No nasal drainage or 

congestion.  No epistaxis.  No sore throat.


Lungs: reports shortness of breath with activity, cough, no sputum production.  

No wheezing.


Cardiovascular: No chest pain, no lower extremity edema.  No palpitations.  No 

paroxysmal nocturnal dyspnea.  No orthopnea.  No lightheadedness or dizziness.  

No syncopal episodes.


Abdominal: no abdominal discomfort.  No nausea, vomiting.  no diarrhea.  No 

constipation.  No bloody or tarry stools.  no loss of appetite.


Genitourinary: No dysuria, increased frequency, urgency.  No urinary retention.


Musculoskeletal: No myalgias.  No muscle weakness, no gait dysfunction, no 

frequent falls.  No back pain.  No neck pain.


Integumentary: No wounds, no lesions.  No rash or pruritus.  No unusual bruisi

ng.  No change in hair or nails.


Neurologic: No aphasia. No facial droop. No change in mentation. No head injury.

No headache. No paralysis. No paresthesia.


Psychiatric: No depression.  No anxiety.  No mood swings.


Endocrine: No abnormal blood sugars.  No weight change.  No excessive sweating 

or thirst.





Physical examination


General Appearance: 70-year-old  male Alert, cooperative, no distress, 

appears stated age.


Neck HEENT: Supple, no lymphadenopathy, no thyroid enlargement, no carotid 

bruits.


Lungs: Clear to auscultation without crackles or wheezes no rhonchi, no 

deformity.


Chest Wall: Chest wall normal expansion with deep inspiration no tenderness and 

no deformity was found on exam, no costochondral pain or discomfort.


Heart: Regular rate and rhythm, S1, S2 normal, no murmur, rub or gallop.


Back: Symmetric, no curvature, ROM normal, no CVA tenderness.


Abdomen: Soft, non-tender, no rebound or rigidity, no hepatosplenomegaly.


Extremities: Extremities normal, atraumatic, no cyanosis or edema.


Pulses: 2+ and symmetric.


Skin: Warm to touch Skin color, texture, tugor normal, no rashes or lesions.


Neurologic: Alert oriented x3 cranial nerves II through XII intact, no motor 

deficit, no abnormal balance or gait





Assessment and plan


1.  Neutropenic fever.  Continue cefepime and vancomycin.  ID and oncology 

consult appreciated.  





2.  Pancytopenia.  See above





3.  Chronic anemia.  See above.  Status post transfusion of 1 unit of packed 

RBCs, stable.  Continue Zarxio.





4. MDS with ringed sideroblasts see above





5.  Tobacco use and dependence





6.  Alcohol abuse, stable





7.  GI prophylaxis.  Pantoprazole 40 mg IV daily





8.  DVT prophylaxis.  Ambulation





9.  Coated infection present





Discharge plan: Home without home care





Impression and plan of care have been directed as dictated by the signing 

physician.  Brittney Bennett nurse practitioner acting as scribe for signing 

physician. 





Objective





- Vital Signs


Vital signs: 


                                   Vital Signs











Temp  98.5 F   06/22/20 07:00


 


Pulse  86   06/22/20 07:00


 


Resp  18   06/22/20 07:00


 


BP  127/68   06/22/20 07:00


 


Pulse Ox  94 L  06/22/20 07:00








                                 Intake & Output











 06/21/20 06/22/20 06/22/20





 18:59 06:59 18:59


 


Intake Total 0 1160 


 


Output Total  250 


 


Balance 0 910 


 


Intake:   


 


  Intake, IV Titration  850 





  Amount   


 


    Sodium Chloride 0.9% 1,  600 





    000 ml @ 75 mls/hr IV .   





    [id number] [facility] Rx#:[id number]   


 


    Vancomycin 1,000 mg In  250 





    Sodium Chloride 0.9% 250   





    ml @ 125 mls/hr IVPB Q12H   





    [facility] Rx#:[id number]   


 


  Blood Product 0 310 


 


    Rc Irr As1  Unit 0 310 





    [id number]   


 


Output:   


 


  Urine  250 


 


Other:   


 


  Voiding Method  Toilet 





  Urinal 


 


  # Voids 2  


 


  # Bowel Movements  1 














- Labs


CBC & Chem 7: 


                                 06/22/20 09:31





                                 06/22/20 09:31


Labs: 


                  Abnormal Lab Results - Last 24 Hours (Table)











  06/21/20 06/21/20 Range/Units





  06:32 08:59 


 


WBC  0.8 L*   (3.8-10.6)  k/uL


 


Crossmatch   See Detail  








                      Microbiology - Last 24 Hours (Table)











 06/19/20 20:49 Blood Culture - Preliminary





 Blood    No Growth after 48 hours

## 2020-06-23 LAB
ALBUMIN SERPL-MCNC: 2.9 G/DL (ref 3.5–5)
ALP SERPL-CCNC: 59 U/L (ref 38–126)
ALT SERPL-CCNC: 39 U/L (ref 4–49)
ANION GAP SERPL CALC-SCNC: 4 MMOL/L
APTT BLD: 28.2 SEC (ref 22–30)
AST SERPL-CCNC: 49 U/L (ref 17–59)
BUN SERPL-SCNC: 13 MG/DL (ref 9–20)
CALCIUM SPEC-MCNC: 7.9 MG/DL (ref 8.4–10.2)
CHLORIDE SERPL-SCNC: 104 MMOL/L (ref 98–107)
CO2 SERPL-SCNC: 27 MMOL/L (ref 22–30)
ERYTHROCYTE [DISTWIDTH] IN BLOOD BY AUTOMATED COUNT: 2.45 M/UL (ref 4.3–5.9)
ERYTHROCYTE [DISTWIDTH] IN BLOOD: 24.5 % (ref 11.5–15.5)
GLUCOSE SERPL-MCNC: 97 MG/DL (ref 74–99)
HCT VFR BLD AUTO: 25.6 % (ref 39–53)
HGB BLD-MCNC: 8 GM/DL (ref 13–17.5)
INR PPP: 1.1 (ref ?–1.2)
LDH SPEC-CCNC: 601 U/L (ref 313–618)
MAGNESIUM SPEC-SCNC: 1.7 MG/DL (ref 1.6–2.3)
MCH RBC QN AUTO: 32.6 PG (ref 25–35)
MCHC RBC AUTO-ENTMCNC: 31.2 G/DL (ref 31–37)
MCV RBC AUTO: 104.7 FL (ref 80–100)
PLATELET # BLD AUTO: 45 K/UL (ref 150–450)
POTASSIUM SERPL-SCNC: 3.9 MMOL/L (ref 3.5–5.1)
PROT SERPL-MCNC: 7.1 G/DL (ref 6.3–8.2)
PT BLD: 11.4 SEC (ref 9–12)
SODIUM SERPL-SCNC: 135 MMOL/L (ref 137–145)
URATE SERPL-MCNC: 5.1 MG/DL (ref 3.5–8.5)
WBC # BLD AUTO: 0.7 K/UL (ref 3.8–10.6)

## 2020-06-23 RX ADMIN — SODIUM CHLORIDE SCH MLS/HR: 9 INJECTION, SOLUTION INTRAVENOUS at 10:19

## 2020-06-23 RX ADMIN — PANTOPRAZOLE SODIUM SCH MG: 40 TABLET, DELAYED RELEASE ORAL at 08:20

## 2020-06-23 RX ADMIN — SODIUM CHLORIDE SCH MLS/HR: 9 INJECTION, SOLUTION INTRAVENOUS at 21:57

## 2020-06-23 RX ADMIN — CEFEPIME HYDROCHLORIDE SCH MLS/HR: 2 INJECTION, POWDER, FOR SOLUTION INTRAVENOUS at 04:21

## 2020-06-23 RX ADMIN — FILGRASTIM-SNDZ SCH MCG: 300 INJECTION, SOLUTION INTRAVENOUS; SUBCUTANEOUS at 17:01

## 2020-06-23 RX ADMIN — CEFAZOLIN SCH: 330 INJECTION, POWDER, FOR SOLUTION INTRAMUSCULAR; INTRAVENOUS at 19:20

## 2020-06-23 RX ADMIN — CEFEPIME HYDROCHLORIDE SCH MLS/HR: 2 INJECTION, POWDER, FOR SOLUTION INTRAVENOUS at 21:11

## 2020-06-23 RX ADMIN — CEFAZOLIN SCH MLS/HR: 330 INJECTION, POWDER, FOR SOLUTION INTRAMUSCULAR; INTRAVENOUS at 04:22

## 2020-06-23 RX ADMIN — ACETAMINOPHEN PRN MG: 325 TABLET, FILM COATED ORAL at 19:20

## 2020-06-23 RX ADMIN — CEFEPIME HYDROCHLORIDE SCH MLS/HR: 2 INJECTION, POWDER, FOR SOLUTION INTRAVENOUS at 13:08

## 2020-06-23 NOTE — PN
PROGRESS NOTE



DATE OF SERVICE:

06/23/2020



REASON FOR FOLLOWUP:

Febrile neutropenia.



INTERVAL HISTORY:

Patient did have a fever this afternoon 100.5. This afternoon the patient was overall

feeling better.  He was breathing comfortably.  No chest pain.  No shortness of breath.

Occasional cough.  No sputum.  No nausea, vomiting.  No abdominal pain or diarrhea.



PHYSICAL EXAMINATION:

Blood pressure 118/66, pulse of 60, temperature 100.5.  He is 94% on room air.  General

description is an elderly male up in the chair in no distress.

RESPIRATORY SYSTEM: Unlabored breathing, decreased breath sounds at bases. No wheeze.

HEART: S1, S2.  Regular rate and rhythm.

ABDOMEN:  Soft, no tenderness.



LABS:

Hemoglobin 8, white count 0.7, creatinine 0.66.  Blood culture has been negative.

Sputum not collected.



DIAGNOSTIC IMPRESSION AND PLAN:

Patient with febrile neutropenia concerning for possible pneumonia in this patient

currently on cefepime and vancomycin.  We will try to obtain a sputum to narrow down

his antibiotics and continue supportive care.





MMODL / IJN: [id number] / Job#: [id number]

## 2020-06-23 NOTE — P.PN
Subjective


Progress Note Date: 06/23/20





This is a 70-year-old patient of Dr. Luu with a past medical history of 

anemia, gout, tobacco use and dependence, daily alcohol use.  And cancer.  

Patient is unsure of the type of cancer that he has.  He has been receiving 

chemotherapy and blood transfusions.  He is under care with Dr. Dos Santos.  Patient 

presented yesterday for evaluation of fever and generalized weakness.  Patient a

transfusion yesterday morning at approximately 9 AM.  He was noted to have a 

fever during the afternoon.  Patient states that his most recent chemotherapy 

was approximately 2 weeks ago.  He denies any pain or at this time.  Denies any 

vomiting or diarrhea.  Patient states that he has been short of breath recently 

with any activity.  Denies any cough.  Patient is a current smoker.





At this time patient is resting in bed without any acute distress.  He is still 

unsure of the type of cancer that he has stating that his white blood cells are 

destroyed his red blood cells.  Patient underwent a blood transfusion yesterday 

and was feeling fine and developed a fever during the afternoon.  Blood pressure

116/52 tabs are 99.9, respirations 18, pulse 62, pulse ox a 99% on 2 L via nasal

cannula.  RBC 2.05, hemoglobin 7.3, platelets 16, potassium 4.1, BUN 18, 

creatinine 0.83





6/21: Patient is resting in bed at this time without any acute distress.  

Patient is found to have myelodysplastic syndrome, continues to be febrile with 

weakness and minimal cough.  Lab results showed to be beefy 0.8, hemoglobin 6.4,

platelets 14, sodium 134, potassium 4.0, BUN 14, creatinine 0.76.  Urine did not

show any signs of infection.  Corona virus PCR was not detected.  Pulse rate 54,

respirations 18, blood pressure 114/54, O2 saturation 100% on 2 L





6/22: Patient is status post 1 unit of packed RBCs with repeat hemoglobin of 8

.0, WBC 0.8, platelet count 31.  Electrolytes and renal function normal.  

Patient has been started on Zarxio.  Patient is also been seen and followed by 

oncology. Patient has been seen by Dr. Hernandez and currently on IV antibiotics in 

form of cefepime and vancomycin, Legionella antigen is pending.  Blood cultures 

no growth at 48 hours.  Has had 2 bowel movements today, one loose and one 

formed.  He is afebrile, heart rate 86, blood pressure 127/68, pulse ox 94% on 

room air.  Discharge plan will be to return home.





6/23: Repeat blood work reveals WBC 0.7, hemoglobin 8, platelet count 45.  The 

uric acid 5.1, calcium 7.9, phosphorus 2.4.  Liver function tests are normal.  

Patient is continued on cefepime and vancomycin per Dr. Mann.  Sputum cultures 

on collected.  Blood culture no growth at 72 hours.  He has been afebrile, 

temperature max 99.5, heart rate 69, blood pressure 127/65, pulse ox 95% on room

air.





Review Of Systems:


Constitutional: Denies fever, no chills, no night sweats.  No weight change.  

Reports weakness and fatigue no lethargy.  No daytime sleepiness.


EENT: No headache.  No blurred vision or double vision, no loss of vision.  No 

loss of Hearing, no ringing in the ears, no dizziness.  No nasal drainage or 

congestion.  No epistaxis.  No sore throat.


Lungs: reports shortness of breath with activity, cough, no sputum production.  

No wheezing.


Cardiovascular: No chest pain, no lower extremity edema.  No palpitations.  No 

paroxysmal nocturnal dyspnea.  No orthopnea.  No lightheadedness or dizziness.  

No syncopal episodes.


Abdominal: no abdominal discomfort.  No nausea, vomiting.  no diarrhea.  No 

constipation.  No bloody or tarry stools.  no loss of appetite.


Genitourinary: No dysuria, increased frequency, urgency.  No urinary retention.


Musculoskeletal: No myalgias.  No muscle weakness, no gait dysfunction, no 

frequent falls.  No back pain.  No neck pain.


Integumentary: No wounds, no lesions.  No rash or pruritus.  No unusual bruising

.  No change in hair or nails.


Neurologic: No aphasia. No facial droop. No change in mentation. No head injury.

No headache. No paralysis. No paresthesia.


Psychiatric: No depression.  No anxiety.  No mood swings.


Endocrine: No abnormal blood sugars.  No weight change.  No excessive sweating 

or thirst.





Physical examination


General Appearance: 70-year-old  male Alert, cooperative, no distress, 

appears stated age.  Resting comfortably in bed.


Neck HEENT: Supple, no lymphadenopathy, no thyroid enlargement, no carotid 

bruits.


Lungs: Clear to auscultation without crackles or wheezes no rhonchi, no 

deformity.


Chest Wall: Chest wall normal expansion with deep inspiration no tenderness and 

no deformity was found on exam, no costochondral pain or discomfort.


Heart: Regular rate and rhythm, S1, S2 normal, no murmur, rub or gallop.


Back: Symmetric, no curvature, ROM normal, no CVA tenderness.


Abdomen: Soft, non-tender, no rebound or rigidity, no hepatosplenomegaly.


Extremities: Extremities normal, atraumatic, no cyanosis or edema.


Pulses: 2+ and symmetric.


Skin: Warm to touch Skin color, texture, tugor normal, no rashes or lesions.


Neurologic: Alert oriented x3 cranial nerves II through XII intact, no motor 

deficit, no abnormal balance or gait





Assessment and plan


1.  Neutropenic fever.  Continue cefepime and vancomycin.  ID and oncology 

consult appreciated.  Blood culture showing no growth.  Sputum culture has not 

been obtained. 





2.  Pancytopenia.   Continue Zarxio.





3.  Chronic anemia.  See above.  Status post transfusion of 1 unit of packed 

RBCs, stable.  Continue Zarxio.





4.  MDS with ringed sideroblasts see above





5.  Tobacco use and dependence





6.  Alcohol abuse, stable





7.  GI prophylaxis.  Pantoprazole 40 mg IV daily





8.  DVT prophylaxis.  Ambulation





9.  COVID-19 infection not present





Discharge plan: Home without home care





Impression and plan of care have been directed as dictated by the signing 

physician.  Brittney Bennett nurse practitioner acting as scribe for signing 

physician. 





Objective





- Vital Signs


Vital signs: 


                                   Vital Signs











Temp  99.2 F   06/23/20 07:00


 


Pulse  69   06/23/20 07:00


 


Resp  18   06/23/20 07:00


 


BP  127/65   06/23/20 07:00


 


Pulse Ox  95   06/23/20 07:00








                                 Intake & Output











 06/22/20 06/23/20 06/23/20





 18:59 06:59 18:59


 


Intake Total  200 


 


Output Total 325  


 


Balance -325 200 


 


Intake:   


 


  Oral  200 


 


Output:   


 


  Urine 325  


 


Other:   


 


  Voiding Method Urinal Urinal 


 


  # Voids  4 














- Labs


CBC & Chem 7: 


                                 06/23/20 07:26





                                 06/23/20 07:26


Labs: 


                  Abnormal Lab Results - Last 24 Hours (Table)











  06/21/20 06/22/20 06/22/20 Range/Units





  06:32 09:31 09:31 


 


WBC   0.8 L*   (3.8-10.6)  k/uL


 


RBC   2.46 L   (4.30-5.90)  m/uL


 


Hgb   8.0 L D   (13.0-17.5)  gm/dL


 


Hct   25.9 L   (39.0-53.0)  %


 


MCV   105.3 H   (80.0-100.0)  fL


 


MCHC   30.8 L   (31.0-37.0)  g/dL


 


RDW   24.7 H   (11.5-15.5)  %


 


Plt Count   31 L D   (150-450)  k/uL


 


Macrocytosis   Marked A   


 


Glucose    102 H  (74-99)  mg/dL


 


Calcium    7.8 L  (8.4-10.2)  mg/dL


 


Albumin    3.1 L  (3.5-5.0)  g/dL


 


Procalcitonin  0.17 H    (0.02-0.09)  ng/mL








                      Microbiology - Last 24 Hours (Table)











 06/19/20 20:49 Blood Culture - Preliminary





 Blood    No Growth after 72 hours

## 2020-06-23 NOTE — P.PN
Subjective


Progress Note Date: 06/23/20


Principal diagnosis: 





MDS, Pancytopenia, Febrile Neutropenia





Patient remains severely neutropenic, although afebrile


Prophylaxic antibipotics on board, platelets and hemoglobin safe range. 





Objective





- Vital Signs


Vital signs: 


                                   Vital Signs











Temp  98.7 F   06/23/20 15:00


 


Pulse  74   06/23/20 15:00


 


Resp  17   06/23/20 15:00


 


BP  128/71   06/23/20 15:00


 


Pulse Ox  94 L  06/23/20 15:00








                                 Intake & Output











 06/22/20 06/23/20 06/23/20





 18:59 06:59 18:59


 


Intake Total  200 


 


Output Total 325  


 


Balance -325 200 


 


Weight   61.144 kg


 


Intake:   


 


  Oral  200 


 


Output:   


 


  Urine 325  


 


Other:   


 


  Voiding Method Urinal Urinal 


 


  # Voids  4 2














- Exam





Constitutional: No acute distress.


HEENT: Conjunctival pallor. Mucosa dry, no thrush


Neck: Neck supple.


Lungs:No respiratory distress.


Heart: Normal rate.  


Abdomen: Soft, nontender, nondistended.


MSK: 4/4 strength in all 4 extremities.


Neuro: Alert and oriented x 3. chronically ill appearance


Skin: No jaundice.


Psych: Appropriate affect.





- Labs


CBC & Chem 7: 


                                 06/23/20 07:26





                                 06/23/20 07:26


Labs: 


                  Abnormal Lab Results - Last 24 Hours (Table)











  06/23/20 06/23/20 Range/Units





  07:26 07:26 


 


WBC   0.7 L*  (3.8-10.6)  k/uL


 


RBC   2.45 L  (4.30-5.90)  m/uL


 


Hgb   8.0 L  (13.0-17.5)  gm/dL


 


Hct   25.6 L  (39.0-53.0)  %


 


MCV   104.7 H  (80.0-100.0)  fL


 


RDW   24.5 H  (11.5-15.5)  %


 


Plt Count   45 L  (150-450)  k/uL


 


Macrocytosis   Marked A  


 


Sodium  135 L   (137-145)  mmol/L


 


Calcium  7.9 L   (8.4-10.2)  mg/dL


 


Phosphorus  2.4 L   (2.5-4.5)  mg/dL


 


Albumin  2.9 L   (3.5-5.0)  g/dL








                      Microbiology - Last 24 Hours (Table)











 06/19/20 20:49 Blood Culture - Preliminary





 Blood    No Growth after 72 hours














Assessment and Plan


Plan: 





Assessment and Plan








1. Pancytopenia due to chemotherapy and underlying MDS:


 - Monitor CBC Daily and Supportive irradiated transfusions PRN Hemoglobin less 

than 7, Platelets less than 15


 - Continue on G-CSF for neutropenia


 - WBC no improvement


 - Anemia and thrombocytopenia: Stable





2. Febrile neutropenia:


 - Improving T-Max 24 hours 99.4


 - Pan Cultures pending


 - Blood Cultures negative at 48 hours


 - Antibiotics per ID





3. Pneumonia:


 - Work up suggestive of pneumonia based on hazy infiltrates on CXR.  





4. MDS with ringed sideroblasts


 - Dacogen on hold until recovery of current febrile illness





Plan:


 - Monitor Daily CBC, CMP


 - Monitor for worsening signs of infection 


 - Monitor for bleeding. 





Physician attest: I have completed the full history and physical and agree with 

above dictation, dictated as a scribe

## 2020-06-24 LAB
ERYTHROCYTE [DISTWIDTH] IN BLOOD BY AUTOMATED COUNT: 2.11 M/UL (ref 4.3–5.9)
ERYTHROCYTE [DISTWIDTH] IN BLOOD: 24 % (ref 11.5–15.5)
HCT VFR BLD AUTO: 22 % (ref 39–53)
HGB BLD-MCNC: 6.9 GM/DL (ref 13–17.5)
MCH RBC QN AUTO: 32.4 PG (ref 25–35)
MCHC RBC AUTO-ENTMCNC: 31.2 G/DL (ref 31–37)
MCV RBC AUTO: 103.9 FL (ref 80–100)
PLATELET # BLD AUTO: 60 K/UL (ref 150–450)
WBC # BLD AUTO: 0.4 K/UL (ref 3.8–10.6)

## 2020-06-24 RX ADMIN — SODIUM CHLORIDE SCH MLS/HR: 9 INJECTION, SOLUTION INTRAVENOUS at 09:27

## 2020-06-24 RX ADMIN — CEFEPIME HYDROCHLORIDE SCH MLS/HR: 2 INJECTION, POWDER, FOR SOLUTION INTRAVENOUS at 12:47

## 2020-06-24 RX ADMIN — ACETAMINOPHEN PRN MG: 325 TABLET, FILM COATED ORAL at 19:03

## 2020-06-24 RX ADMIN — CEFEPIME HYDROCHLORIDE SCH MLS/HR: 2 INJECTION, POWDER, FOR SOLUTION INTRAVENOUS at 04:36

## 2020-06-24 RX ADMIN — ACETAMINOPHEN PRN MG: 325 TABLET, FILM COATED ORAL at 07:02

## 2020-06-24 RX ADMIN — FILGRASTIM-SNDZ SCH MCG: 300 INJECTION, SOLUTION INTRAVENOUS; SUBCUTANEOUS at 17:03

## 2020-06-24 RX ADMIN — CEFEPIME HYDROCHLORIDE SCH MLS/HR: 2 INJECTION, POWDER, FOR SOLUTION INTRAVENOUS at 20:55

## 2020-06-24 RX ADMIN — PANTOPRAZOLE SODIUM SCH MG: 40 TABLET, DELAYED RELEASE ORAL at 07:02

## 2020-06-24 RX ADMIN — SODIUM CHLORIDE SCH MLS/HR: 9 INJECTION, SOLUTION INTRAVENOUS at 22:51

## 2020-06-24 RX ADMIN — CEFAZOLIN SCH MLS/HR: 330 INJECTION, POWDER, FOR SOLUTION INTRAMUSCULAR; INTRAVENOUS at 07:02

## 2020-06-24 NOTE — P.PN
Subjective


Progress Note Date: 06/24/20





This is a 70-year-old patient of Dr. Luu with a past medical history of 

anemia, gout, tobacco use and dependence, daily alcohol use.  And cancer.  

Patient is unsure of the type of cancer that he has.  He has been receiving 

chemotherapy and blood transfusions.  He is under care with Dr. Dos Santos.  Patient 

presented yesterday for evaluation of fever and generalized weakness.  Patient a

transfusion yesterday morning at approximately 9 AM.  He was noted to have a 

fever during the afternoon.  Patient states that his most recent chemotherapy 

was approximately 2 weeks ago.  He denies any pain or at this time.  Denies any 

vomiting or diarrhea.  Patient states that he has been short of breath recently 

with any activity.  Denies any cough.  Patient is a current smoker.





At this time patient is resting in bed without any acute distress.  He is still 

unsure of the type of cancer that he has stating that his white blood cells are 

destroyed his red blood cells.  Patient underwent a blood transfusion yesterday 

and was feeling fine and developed a fever during the afternoon.  Blood pressure

116/52 tabs are 99.9, respirations 18, pulse 62, pulse ox a 99% on 2 L via nasal

cannula.  RBC 2.05, hemoglobin 7.3, platelets 16, potassium 4.1, BUN 18, 

creatinine 0.83





6/21: Patient is resting in bed at this time without any acute distress.  

Patient is found to have myelodysplastic syndrome, continues to be febrile with 

weakness and minimal cough.  Lab results showed to be beefy 0.8, hemoglobin 6.4,

platelets 14, sodium 134, potassium 4.0, BUN 14, creatinine 0.76.  Urine did not

show any signs of infection.  Corona virus PCR was not detected.  Pulse rate 54,

respirations 18, blood pressure 114/54, O2 saturation 100% on 2 L





6/22: Patient is status post 1 unit of packed RBCs with repeat hemoglobin of 8

.0, WBC 0.8, platelet count 31.  Electrolytes and renal function normal.  

Patient has been started on Zarxio.  Patient is also been seen and followed by 

oncology. Patient has been seen by Dr. Hernandez and currently on IV antibiotics in 

form of cefepime and vancomycin, Legionella antigen is pending.  Blood cultures 

no growth at 48 hours.  Has had 2 bowel movements today, one loose and one 

formed.  He is afebrile, heart rate 86, blood pressure 127/68, pulse ox 94% on 

room air.  Discharge plan will be to return home.





6/23: Repeat blood work reveals WBC 0.7, hemoglobin 8, platelet count 45.  The 

uric acid 5.1, calcium 7.9, phosphorus 2.4.  Liver function tests are normal.  

Patient is continued on cefepime and vancomycin per Dr. Mann.  Sputum cultures 

on collected.  Blood culture no growth at 72 hours.  He has been afebrile, 

temperature max 99.5, heart rate 69, blood pressure 127/65, pulse ox 95% on room

air.





6/24: Counts remained low with WBC 0.4, hemoglobin 6.9, platelet count 60.  One 

unit of packed RBCs to be transfused today.  Fever last evening of 100.4.  Heart

rate is 77, blood pressure 135/55, pulse ox 94% on room air. Patient is followed

closely by oncology.  Patient remains on cefepime and vancomycin and followed by

infectious disease.





Review Of Systems:


Constitutional: Denies fever, no chills, no night sweats.  No weight change.  

Reports weakness and fatigue no lethargy.  No daytime sleepiness.


EENT: No headache.  No blurred vision or double vision, no loss of vision.  No 

dizziness.  No nasal drainage or congestion.  No epistaxis.  No sore throat.


Lungs: reports shortness of breath with activity, cough, no sputum production.  

No wheezing.


Cardiovascular: No chest pain, no lower extremity edema.  No palpitations.  No 

paroxysmal nocturnal dyspnea.  No orthopnea.  No lightheadedness or dizziness.  

No syncopal episodes.


Abdominal: no abdominal discomfort.  No nausea, vomiting.  no diarrhea.  No 

constipation.  No bloody or tarry stools.  no loss of appetite.


Genitourinary: No dysuria, increased frequency, urgency.  No urinary retention.


Musculoskeletal: No myalgias.  No muscle weakness, no gait dysfunction, no 

frequent falls.  No back pain.  No neck pain.


Integumentary: No wounds, no lesions.  No rash or pruritus.  No unusual bruising

.  No change in hair or nails.


Neurologic: No aphasia. No facial droop. No change in mentation. No head injury.

No headache. No paralysis. No paresthesia.


Psychiatric: No depression.  No anxiety.  No mood swings.


Endocrine: No abnormal blood sugars.  No weight change.  No excessive sweating 

or thirst.





Physical examination


General Appearance: 70-year-old  male Alert, cooperative, no distress, 

appears stated age.  Resting comfortably in bed.


Neck HEENT: Supple, no lymphadenopathy, no thyroid enlargement, no carotid 

bruits.


Lungs: Clear to auscultation without crackles or wheezes no rhonchi, no 

deformity.


Chest Wall: Chest wall normal expansion with deep inspiration no tenderness and 

no deformity was found on exam, no costochondral pain.


Heart: Regular rate and rhythm, S1, S2 normal, no murmur, rub or gallop.


Back: Symmetric, no curvature, ROM normal, no CVA tenderness.


Abdomen: Soft, non-tender, no rebound or rigidity, no hepatosplenomegaly.


Extremities: Extremities normal, atraumatic, no cyanosis or edema.


Pulses: 2+ and symmetric.


Skin: Warm to touch Skin color, texture, tugor normal, no rashes or lesions.


Neurologic: Alert oriented x3 cranial nerves II through XII intact, no motor 

deficit, no abnormal balance or gait





Assessment and plan


1.  Neutropenic fever.  Continue cefepime and vancomycin.  ID and oncology 

consult appreciated.  Blood culture showing no growth.  Sputum culture has not 

been obtained. 





2.  Pancytopenia.   Continue Zarxio.





3.  Chronic anemia.  See above.  Status post transfusion of 1 unit of packed 

RBCs, second unit to be transfused today.  Continue Zarxio.





4.  MDS with ringed sideroblasts see above





5.  Tobacco use and dependence





6.  Alcohol abuse, stable





7.  GI prophylaxis.  Pantoprazole 40 mg IV daily





8.  DVT prophylaxis.  Ambulation





9.  COVID-19 infection not present





Discharge plan: Home without home care





Impression and plan of care have been directed as dictated by the signing 

physician.  Brittney Bennett nurse practitioner acting as scribe for signing 

physician. 





Objective





- Vital Signs


Vital signs: 


                                   Vital Signs











Temp  100.4 F H  06/24/20 07:00


 


Pulse  77   06/24/20 07:00


 


Resp  18   06/24/20 07:00


 


BP  135/55   06/24/20 07:00


 


Pulse Ox  94 L  06/24/20 07:00








                                 Intake & Output











 06/23/20 06/24/20 06/24/20





 18:59 06:59 18:59


 


Intake Total  100 


 


Output Total  1100 


 


Balance  -1000 


 


Weight 61.144 kg  


 


Intake:   


 


  Oral  100 


 


Output:   


 


  Urine  1100 


 


Other:   


 


  Voiding Method  Urinal 


 


  # Voids 2 2 


 


  # Bowel Movements  1 














- Labs


CBC & Chem 7: 


                                 06/24/20 09:32





                                 06/23/20 07:26


Labs: 


                  Abnormal Lab Results - Last 24 Hours (Table)











  06/23/20 06/23/20 Range/Units





  07:26 07:26 


 


WBC   0.7 L*  (3.8-10.6)  k/uL


 


RBC   2.45 L  (4.30-5.90)  m/uL


 


Hgb   8.0 L  (13.0-17.5)  gm/dL


 


Hct   25.6 L  (39.0-53.0)  %


 


MCV   104.7 H  (80.0-100.0)  fL


 


RDW   24.5 H  (11.5-15.5)  %


 


Plt Count   45 L  (150-450)  k/uL


 


Macrocytosis   Marked A  


 


Sodium  135 L   (137-145)  mmol/L


 


Calcium  7.9 L   (8.4-10.2)  mg/dL


 


Phosphorus  2.4 L   (2.5-4.5)  mg/dL


 


Albumin  2.9 L   (3.5-5.0)  g/dL








                      Microbiology - Last 24 Hours (Table)











 06/19/20 20:49 Blood Culture - Preliminary





 Blood    No Growth after 96 hours

## 2020-06-24 NOTE — P.PN
Subjective


Progress Note Date: 06/24/20


Principal diagnosis: 





MDS, Pancytopenia, Febrile Neutropenia





Long discussion with patient and wife today. He is not responding to dacogen, 

growth factor. Overall disease outcome not favorable and overall prognosis poor.

We discussed options for quality of life. Will continue prn transfusions and 

antibiotics as long as able to and the benefit outweighs risks. Although to 

discharge home on Palliative care services tomorrow is resonable with overall 

goal set at quality and no hospital admissions. Will change to DNR. Patient and 

wife questions answered and understandings stated. 





Objective





- Vital Signs


Vital signs: 


                                   Vital Signs











Temp  100.4 F H  06/24/20 07:00


 


Pulse  77   06/24/20 07:00


 


Resp  18   06/24/20 07:00


 


BP  135/55   06/24/20 07:00


 


Pulse Ox  94 L  06/24/20 07:00








                                 Intake & Output











 06/23/20 06/24/20 06/24/20





 18:59 06:59 18:59


 


Intake Total  100 100


 


Output Total  1100 


 


Balance  -1000 100


 


Weight 61.144 kg  


 


Intake:   


 


  Oral  100 100


 


Output:   


 


  Urine  1100 


 


Other:   


 


  Voiding Method  Urinal 


 


  # Voids 2 2 


 


  # Bowel Movements  1 














- Exam





Constitutional: No acute distress.


HEENT: Conjunctival pallor. Mucosa dry, no thrush


Neck: Neck supple.


Lungs:No respiratory distress.


Heart: Normal rate.  


Abdomen: Soft, nontender, nondistended.


MSK: 4/4 strength in all 4 extremities.


Neuro: Alert and oriented x 3. chronically ill appearance


Skin: No jaundice.


Psych: Appropriate affect.





- Labs


CBC & Chem 7: 


                                 06/24/20 09:32





                                 06/23/20 07:26


Labs: 


                  Abnormal Lab Results - Last 24 Hours (Table)











  06/24/20 06/24/20 Range/Units





  09:32 10:49 


 


WBC  0.4 L*   (3.8-10.6)  k/uL


 


RBC  2.11 L   (4.30-5.90)  m/uL


 


Hgb  6.9 L*   (13.0-17.5)  gm/dL


 


Hct  22.0 L   (39.0-53.0)  %


 


MCV  103.9 H   (80.0-100.0)  fL


 


RDW  24.0 H   (11.5-15.5)  %


 


Plt Count  60 L   (150-450)  k/uL


 


Macrocytosis  Marked A   


 


Crossmatch   See Detail  








                      Microbiology - Last 24 Hours (Table)











 06/19/20 20:49 Blood Culture - Preliminary





 Blood    No Growth after 96 hours














Assessment and Plan


Plan: 





Assessment and Plan








1. Pancytopenia due to chemotherapy and underlying MDS:


 - Monitor CBC Daily and Supportive irradiated transfusions PRN Hemoglobin less 

than 7, Platelets less than 15


 - Continue on G-CSF for neutropenia, although no improvement since 

hospitalization


 - WBC no improvement


 - Anemia and thrombocytopenia: Hemoglobin 6.9 - Transfuse PRBC today





2. Febrile neutropenia: 


 - Improving T-Max 24 hours 100.4


 - Pan Cultures pending


 - Blood Cultures negative


 - Antibiotics per ID


 - With his overall disease he will likely continue to [place] with fevers and be

increased risks for infections. He is not a candidate for aggressive therapy 

and/or stem cell transplant and it is resonable to allow patient to go home on 

oalliative services, with prophylaxis anti-viral, anti-fungal, abx. PT/OT while 

able. 


 - Long discussion with patient and wife today. 





3. Pneumonia:


 - Work up suggestive of pneumonia based on hazy infiltrates on CXR.  





4. MDS with ringed sideroblasts


 - Dacogen on hold until recovery of current febrile illness





Plan:


 - Transfuse PRBC today


 - Home with palliative care, continue abx/transfusions for now


 - Eventual transfer to hospice care. 





Physician attest: I have completed the full history and physical and agree with 

above dictation, dictated as a scribe

## 2020-06-25 VITALS
HEART RATE: 72 BPM | DIASTOLIC BLOOD PRESSURE: 62 MMHG | TEMPERATURE: 100.2 F | SYSTOLIC BLOOD PRESSURE: 129 MMHG | RESPIRATION RATE: 18 BRPM

## 2020-06-25 LAB
ERYTHROCYTE [DISTWIDTH] IN BLOOD BY AUTOMATED COUNT: 2.38 M/UL (ref 4.3–5.9)
ERYTHROCYTE [DISTWIDTH] IN BLOOD: 24.4 % (ref 11.5–15.5)
HCT VFR BLD AUTO: 24.2 % (ref 39–53)
HGB BLD-MCNC: 7.7 GM/DL (ref 13–17.5)
MCH RBC QN AUTO: 32.3 PG (ref 25–35)
MCHC RBC AUTO-ENTMCNC: 31.8 G/DL (ref 31–37)
MCV RBC AUTO: 101.7 FL (ref 80–100)
PLATELET # BLD AUTO: 88 K/UL (ref 150–450)
WBC # BLD AUTO: 0.5 K/UL (ref 3.8–10.6)

## 2020-06-25 RX ADMIN — CEFEPIME HYDROCHLORIDE SCH MLS/HR: 2 INJECTION, POWDER, FOR SOLUTION INTRAVENOUS at 05:55

## 2020-06-25 RX ADMIN — CEFEPIME HYDROCHLORIDE SCH: 2 INJECTION, POWDER, FOR SOLUTION INTRAVENOUS at 12:43

## 2020-06-25 RX ADMIN — CEFAZOLIN SCH: 330 INJECTION, POWDER, FOR SOLUTION INTRAMUSCULAR; INTRAVENOUS at 10:41

## 2020-06-25 RX ADMIN — IOPAMIDOL PRN ML: 612 INJECTION, SOLUTION INTRAVENOUS at 09:33

## 2020-06-25 RX ADMIN — CEFAZOLIN SCH: 330 INJECTION, POWDER, FOR SOLUTION INTRAMUSCULAR; INTRAVENOUS at 00:27

## 2020-06-25 RX ADMIN — PANTOPRAZOLE SODIUM SCH MG: 40 TABLET, DELAYED RELEASE ORAL at 07:48

## 2020-06-25 RX ADMIN — SODIUM CHLORIDE SCH MLS/HR: 9 INJECTION, SOLUTION INTRAVENOUS at 09:33

## 2020-06-25 RX ADMIN — IOPAMIDOL PRN ML: 612 INJECTION, SOLUTION INTRAVENOUS at 08:27

## 2020-06-25 NOTE — P.PN
Subjective


Progress Note Date: 06/25/20


Principal diagnosis: 





MDS, Pancytopenia, Febrile Neutropenia





Febrile again this am at 100.2





Objective





- Vital Signs


Vital signs: 


                                   Vital Signs











Temp  100.2 F H  06/25/20 07:00


 


Pulse  72   06/25/20 07:00


 


Resp  18   06/25/20 07:00


 


BP  129/62   06/25/20 07:00


 


Pulse Ox  94 L  06/25/20 07:00








                                 Intake & Output











 06/24/20 06/25/20 06/25/20





 18:59 06:59 18:59


 


Intake Total 510  850


 


Output Total  300 


 


Balance 510 -300 850


 


Intake:   


 


  IV   850


 


    Sodium Chloride 0.9% 1,   600





    000 ml @ 75 mls/hr IV .   





    [id number] FARIHA Rx#:[id number]   


 


    Vancomycin 1,000 mg In   250





    Sodium Chloride 0.9% 250   





    ml @ 125 mls/hr IVPB Q12H   





    FARIHA Rx#:[id number]   


 


  Oral 200  


 


  Blood Product 310  


 


    Rc Irr As1  Unit 310  





    [id number]   


 


Output:   


 


  Urine  300 


 


Other:   


 


  # Voids 3  














- Exam





Constitutional: No acute distress.


HEENT: Conjunctival pallor. Mucosa dry, no thrush


Neck: Neck supple.


Lungs:No respiratory distress.


Heart: Normal rate.  


Abdomen: Soft, nontender, nondistended.


MSK: 4/4 strength in all 4 extremities.


Neuro: Alert and oriented x 3. chronically ill appearance


Skin: No jaundice.


Psych: Appropriate affect.





- Labs


CBC & Chem 7: 


                                 06/25/20 08:12





                                 06/25/20 08:12


Labs: 


                  Abnormal Lab Results - Last 24 Hours (Table)











  06/24/20 06/25/20 06/25/20 Range/Units





  10:49 08:12 08:12 


 


WBC   0.5 L*   (3.8-10.6)  k/uL


 


RBC   2.38 L   (4.30-5.90)  m/uL


 


Hgb   7.7 L   (13.0-17.5)  gm/dL


 


Hct   24.2 L   (39.0-53.0)  %


 


MCV   101.7 H   (80.0-100.0)  fL


 


RDW   24.4 H   (11.5-15.5)  %


 


Plt Count   88 L   (150-450)  k/uL


 


Macrocytosis   Marked A   


 


C-Reactive Protein    160.3 H  (<10.0)  mg/L


 


Crossmatch  See Detail    








                      Microbiology - Last 24 Hours (Table)











 06/19/20 20:49 Blood Culture - Preliminary





 Blood    No Growth after 120 hours














Assessment and Plan


Plan: 





Assessment and Plan








1. Pancytopenia due to chemotherapy and underlying MDS:


 - Monitor CBC Daily and Supportive irradiated transfusions PRN Hemoglobin less 

than 7, Platelets less than 15


 - Continue on G-CSF for neutropenia, although no improvement since 

hospitalization


 - WBC no improvement


 - Anemia and thrombocytopenia: Hemoglobin 6.9 - Transfuse PRBC today





2. Febrile neutropenia: 


 - Improving T-Max 24 hours 100.2


 - Pan Cultures pending


 - Blood Cultures negative


 - Antibiotics per ID


 - With his overall disease he will likely continue to palomino with fevers and be

increased risks for infections. He is not a candidate for aggressive therapy 

and/or stem cell transplant and it is resonable to allow patient to go home on 

oalliative services, with prophylaxis anti-viral, anti-fungal, abx. PT/OT while 

able. 


 - Long discussion with patient and wife today. 





3. Pneumonia:


 - Work up suggestive of pneumonia based on hazy infiltrates on CXR.  





4. MDS with ringed sideroblasts


 - Dacogen on hold until recovery of current febrile illness





Plan:


 - Discussed options again with patient and wife and both agree they would like 

to go home and treat there through palliative care. 


 - Home with palliative care, continue abx/transfusions for now (Acyclovir, 

diflucan, levaquin)


 - Eventual transfer to hospice care. 


 - CBC one week with Dr. Dos Santos and supportive transfusion if needed

## 2020-06-25 NOTE — PN
PROGRESS NOTE



DATE OF SERVICE:

06/24/2020



REASON FOR FOLLOWUP:

Febrile neutropenia.



INTERVAL HISTORY:

The patient did spike another fever this evening of 102 degrees Fahrenheit. When seen

earlier this afternoon, the patient was afebrile, was feeling better.  Breathing

comfortably.  No chest pain.  Occasional cough.  No abdominal pain or diarrhea.



PHYSICAL EXAMINATION:

Blood pressure 137/55 with a pulse of 74, T-max 102. He is 95% on room air.

General description is an elderly male up in the chair in no distress.

RESPIRATORY SYSTEM: Unlabored breathing, decreased breath sounds at the bases. No

wheeze.

HEART: S1, S2.  Regular rate and rhythm.

ABDOMEN:  Soft, no tenderness.



LABS:

White count is low at 0.4. Vancomycin trough 16.7. Creatinine 0.66.  Blood culture has

been negative.



DIAGNOSTIC IMPRESSION AND PLAN:

Patient with febrile neutropenia in this patient with initial concern for possible

pneumonia.  The patient is still running a persistent fever.  Cultures will be

repeated.  We will also obtain a CT abdomen and pelvis to rule out any abdominal

source.  Continue with cefepime and vancomycin and Levaquin and monitor clinical course

closely.





MMODL / IJN: [id number] / Job#: [id number]

## 2020-06-25 NOTE — P.DS
Providers


Date of admission: 


06/19/20 21:10





Expected date of discharge: 06/25/20


Attending physician: 


Yogesh Mendoza





Consults: 





                                        





06/19/20 21:09


Consult Physician Routine 


   Consulting Provider: Eliezer Dos Santos


   Consult Reason/Comments: Neutropenic fever


   Do you want consulting provider notified?: Yes


Consult Physician Routine 


   Consulting Provider: Neisha Hernandez


   Consult Reason/Comments: Neutropenic fever


   Do you want consulting provider notified?: Yes











Primary care physician: 


[facility] Course: 





This is a 70-year-old patient of Dr. Luu with a past medical history of 

anemia, gout, tobacco use and dependence, daily alcohol use.  And cancer.  

Patient is unsure of the type of cancer that he has.  He has been receiving 

chemotherapy and blood transfusions.  He is under care with Dr. Dos Santos.  Patient 

presented yesterday for evaluation of fever and generalized weakness.  Patient a

transfusion yesterday morning at approximately 9 AM.  He was noted to have a 

fever during the afternoon.  Patient states that his most recent chemotherapy 

was approximately 2 weeks ago.  He denies any pain or at this time.  Denies any 

vomiting or diarrhea.  Patient states that he has been short of breath recently 

with any activity.  Denies any cough.  Patient is a current smoker.





At this time patient is resting in bed without any acute distress.  He is still 

unsure of the type of cancer that he has stating that his white blood cells are 

destroyed his red blood cells.  Patient underwent a blood transfusion yesterday 

and was feeling fine and developed a fever during the afternoon.  Blood pressure

116/52 tabs are 99.9, respirations 18, pulse 62, pulse ox a 99% on 2 L via nasal

cannula.  RBC 2.05, hemoglobin 7.3, platelets 16, potassium 4.1, BUN 18, 

creatinine 0.83





6/21: Patient is resting in bed at this time without any acute distress.  

Patient is found to have myelodysplastic syndrome, continues to be febrile with 

weakness and minimal cough.  Lab results showed to be beefy 0.8, hemoglobin 6.4,

platelets 14, sodium 134, potassium 4.0, BUN 14, creatinine 0.76.  Urine did not

show any signs of infection.  Corona virus PCR was not detected.  Pulse rate 54,

respirations 18, blood pressure 114/54, O2 saturation 100% on 2 L





6/22: Patient is status post 1 unit of packed RBCs with repeat hemoglobin of 

8.0, WBC 0.8, platelet count 31.  Electrolytes and renal function normal.  P

atient has been started on Zarxio.  Patient is also been seen and followed by 

oncology. Patient has been seen by Dr. Hernandez and currently on IV antibiotics in 

form of cefepime and vancomycin, Legionella antigen is pending.  Blood cultures 

no growth at 48 hours.  Has had 2 bowel movements today, one loose and one 

formed.  He is afebrile, heart rate 86, blood pressure 127/68, pulse ox 94% on 

room air.  Discharge plan will be to return home.





6/23: Repeat blood work reveals WBC 0.7, hemoglobin 8, platelet count 45.  The 

uric acid 5.1, calcium 7.9, phosphorus 2.4.  Liver function tests are normal.  

Patient is continued on cefepime and vancomycin per Dr. Mann.  Sputum cultures 

on collected.  Blood culture no growth at 72 hours.  He has been afebrile, 

temperature max 99.5, heart rate 69, blood pressure 127/65, pulse ox 95% on room

air.





6/24: Counts remained low with WBC 0.4, hemoglobin 6.9, platelet count 60.  One 

unit of packed RBCs to be transfused today.  Fever last evening of 100.4.  Heart

rate is 77, blood pressure 135/55, pulse ox 94% on room air. Patient is followed

closely by oncology.  Patient remains on cefepime and vancomycin and followed by

infectious disease.





6/25: Patient's lab work is not improving.  White count is 0.5, hemoglobin 7.7, 

platelet count 88.  Creatinine 0.68.  Patient did receive 1 unit of packed RBCs 

yesterday for total of 2 units on this admission.  Temperature max 102.7.  Noted

that oncology recommended palliative care for discharge home as patient has a 

poor prognosis and is not responding to current treatment.  Patient does not 

recall this conversation but he states that his wife was crying.  Information 

was reviewed with the patient today in detail and currently the plan is to go 

home with palliative care and he wants to meet with hospice care with his family

at home.  Skin the abdomen revealed wedge-shaped hypodensity in the spleen.  

Differential include laceration of recent trauma but suspect vascular 

compromise.  Additional mild to moderate wall thickening of the colon to splenic

flexure suggest colitis possibly of infectious inflammatory or ischemic 

etiology.  Abnormal intra-abdominal retroperitoneal lymph nodes could reflect 

products of infectious process, neoplasm needs to be considered.  Trace left 

abdominal and pelvic ascites.  Perhaps partially obstructive bowel gas pattern. 

No complete obstruction.  The patient will be discharged home today with planned

open to hospice.











Assessment and plan


1.  Neutropenic fever. 


2.  Pancytopenia.  


3.  Chronic anemia of chronic disease s/p transfusion of 2 units PRBC. 


4.  MDS with ringed sideroblasts see above


5.  Tobacco use and dependence


6.  Alcohol abuse, stable


7.  COVID-19 infection not present





Discharge plan: Home without home care/hospice





Impression and plan of care have been directed as dictated by the signing 

physician.  Brittney Bennett nurse practitioner acting as scribe for signing 

physician. 





Patient Condition at Discharge: Good





Plan - Discharge Summary


Discharge Rx Participant: No


New Discharge Prescriptions: 


New


   Acyclovir 400 mg PO TID #90 tablet


   Fluconazole [Diflucan] 100 mg PO DAILY #30 tab


   Acetaminophen Tab [Tylenol] 650 mg PO Q6HR PRN  tab


     PRN Reason: Mild Pain Or Fever > 100.5


Discharge Medication List





Acyclovir 400 mg PO TID #90 tablet 06/24/20 [Rx]


Fluconazole [Diflucan] 100 mg PO DAILY #30 tab 06/24/20 [Rx]


Acetaminophen Tab [Tylenol] 650 mg PO Q6HR PRN  tab 06/25/20 [Rx]








Follow up Appointment(s)/Referral(s): 


[facility], [NON-STAFF] - 


Yogesh Mendoza MD [Medical Doctor] - As Needed


Discharge Disposition: HOME WITH HOME HEALTH SERVICES

## 2020-06-25 NOTE — PN
PROGRESS NOTE



DATE OF SERVICE:

06/25/2020



REASON FOR FOLLOWUP:

Febrile neutropenia, possible abdominal source.



INTERVAL HISTORY:

The patient's overall fever pattern seems to have improved, with a fever of 100.2 this

morning. The patient is breathing comfortably.  He denies having any chest pain or

shortness of breath.  He did have some cough but no sputum. Some vague lower abdominal

pain.  No vomiting or diarrhea.



PHYSICAL EXAMINATION:

Blood pressure 129/62 with a pulse of 72, temperature 100.2.  He is 94% on room air.

General description is an elderly male up in the chair in no distress.

RESPIRATORY SYSTEM: Unlabored breathing. Clear to auscultation anteriorly.

HEART: S1, S2.  Regular rate and rhythm.

ABDOMEN: Soft. No tenderness.



LABS:

Hemoglobin 7.7, white count 0.5.  CT of abdomen and pelvis was completed, with evidence

of colitis, question or infection versus inflammatory, and splenic trauma with

suspected vascular compromise.



DIAGNOSTIC IMPRESSION AND PLAN:

Patient with a fever which was more likely due to abdominal origin with concern for a

splenic hematoma or compromise versus colitis, on cefepime. Will add Flagyl, though

plan for possible hospice per primary. If that is the case, antibiotic can be safely

discontinued.





MMODL / IJN: [id number] / Job#: [id number]

## 2020-06-25 NOTE — CT
EXAMINATION TYPE: CT abdomen pelvis w con

 

DATE OF EXAM: 6/25/2020

 

COMPARISON: Chest x-ray 6 days ago

 

HISTORY: Neutropic fever, pneumonia, pain.

 

CT DLP: 586.8 mGycm, Automated Exposure Control for Dose Reduction was Utilized.

 

CONTRAST: 

CT scan of the abdomen and pelvis is performed with oral and with IV Contrast, patient injected with 
100 mL of Isovue 300.

 

FINDINGS:

 

LUNG BASES: Partial visualization of known calcified pleural plaques at the diaphragm level suggestin
g prior asbestos exposure. Mild emphysematous change in the lung bases with mild linear scarring and/
or atelectasis.

 

LIVER/GB:   No significant abnormality is appreciated.

 

PANCREAS:  No significant abnormality is seen.

 

SPLEEN: Wedge-shaped area of hypodensity in the spleen coronal image 65 for reference measures 3.2 x 
0.8 cm coronal image 65.

 

ADRENALS: Nonspecific posterior limb right adrenal mass measuring 1.5 x 0.8 cm axial image 13.

 

KIDNEYS: Simple appearing 1.2 cm thin-walled cyst posteriorly right kidney upper pole level coronal i
mage 66. Symmetric cortical medullary uptake and excretion without hydronephrosis seen bilaterally. M
ild wall thickening in poorly distended bladder, correlate clinically to exclude acute cystitis.

 

BOWEL: Oral contrast was not significantly reached colonic level making evaluation of distal bowel sl
ightly suboptimal. Small bowel loops are slightly prominent in the right abdomen measuring nearly up 
to 3.0 cm in size. No definitive greater than 3.0 cm dilatation. Contrast extends to the cecum. Mild-
to-moderate wall thickening in the colon there are level of the splenic flexure extending into left c
olon sigmoid colon and rectum. Proximal to splenic flexure there is mild/moderate fecal prominence. M
oderate wall thickening gastroduodenal junction.

 

PROSTATE/SEMINAL VESICLES:  No gross abnormality seen.

 

LYMPH NODES: There are abnormal retroperitoneal lymph nodes, for reference and 1.5 x 1.4 cm aortocava
l lymph node axial image 33 below level of renal veins. There is abnormal 1.6 x 1.5 cm aortocaval lym
ph node at level of draining left renal vein

20. There are enlarged bilateral groin lymph nodes left more numerous and larger than right but there
 appears to be retention of fatty hilum seen best on coronal images. Borderline pelvic iliac chain ly
mph nodes on the right side are identified.

 

OSSEOUS STRUCTURES: Moderate to severe disc space narrowing with vacuum disc phenomenon lumbosacral j
unction. Facet arthropathy lower lumbar levels. Mild to moderate disc space narrowing L1-L2 level.

 

OTHER: Moderate calcified plaque in the ectatic abdominal aorta. Small 1.2 cm aneurysm right internal
 iliac artery axial image 15. No significant plaque or stenosis celiac artery or SMA Trace presacral 
ascites. Trace free fluid left paracolic gutter

 

IMPRESSION:

1. Wedge-shaped area of hypodensity in spleen as detailed above. Differential would include laceratio
n of recent trauma but suspect vascular compromise. Additional mild-to-moderate wall thickening of th
e colon past the splenic flexure suggests colitis possibly of infectious inflammatory or ischemic giovanny
ology. Correlate clinically. There are abnormal intra-abdominal retroperitoneal lymph nodes could ref
lect products of infectious process, neoplasm however needs to be considered. Trace left abdominal an
d pelvic ascites. Perhaps partially obstructive bowel gas pattern. No complete obstruction. No well-f
ormed drainable fluid collection or abscess.

## 2020-06-27 ENCOUNTER — HOSPITAL ENCOUNTER (INPATIENT)
Dept: HOSPITAL 47 - EC | Age: 70
LOS: 9 days | Discharge: HOME HEALTH | DRG: 871 | End: 2020-07-06
Attending: FAMILY MEDICINE | Admitting: FAMILY MEDICINE
Payer: MEDICARE

## 2020-06-27 VITALS — BODY MASS INDEX: 18 KG/M2

## 2020-06-27 DIAGNOSIS — Z71.3: ICD-10-CM

## 2020-06-27 DIAGNOSIS — A41.9: Primary | ICD-10-CM

## 2020-06-27 DIAGNOSIS — C34.11: ICD-10-CM

## 2020-06-27 DIAGNOSIS — F10.10: ICD-10-CM

## 2020-06-27 DIAGNOSIS — K81.9: ICD-10-CM

## 2020-06-27 DIAGNOSIS — Z71.6: ICD-10-CM

## 2020-06-27 DIAGNOSIS — D63.0: ICD-10-CM

## 2020-06-27 DIAGNOSIS — R18.8: ICD-10-CM

## 2020-06-27 DIAGNOSIS — Z20.828: ICD-10-CM

## 2020-06-27 DIAGNOSIS — D61.810: ICD-10-CM

## 2020-06-27 DIAGNOSIS — Z92.21: ICD-10-CM

## 2020-06-27 DIAGNOSIS — J90: ICD-10-CM

## 2020-06-27 DIAGNOSIS — J44.1: ICD-10-CM

## 2020-06-27 DIAGNOSIS — E43: ICD-10-CM

## 2020-06-27 DIAGNOSIS — R10.11: ICD-10-CM

## 2020-06-27 DIAGNOSIS — K57.90: ICD-10-CM

## 2020-06-27 DIAGNOSIS — Z91.19: ICD-10-CM

## 2020-06-27 DIAGNOSIS — R19.7: ICD-10-CM

## 2020-06-27 DIAGNOSIS — J44.0: ICD-10-CM

## 2020-06-27 DIAGNOSIS — M10.9: ICD-10-CM

## 2020-06-27 DIAGNOSIS — Z80.9: ICD-10-CM

## 2020-06-27 DIAGNOSIS — D70.9: ICD-10-CM

## 2020-06-27 DIAGNOSIS — F17.210: ICD-10-CM

## 2020-06-27 DIAGNOSIS — D46.Z: ICD-10-CM

## 2020-06-27 DIAGNOSIS — D73.5: ICD-10-CM

## 2020-06-27 DIAGNOSIS — J18.9: ICD-10-CM

## 2020-06-27 DIAGNOSIS — T45.1X5A: ICD-10-CM

## 2020-06-27 DIAGNOSIS — R10.13: ICD-10-CM

## 2020-06-27 DIAGNOSIS — Z79.899: ICD-10-CM

## 2020-06-27 DIAGNOSIS — R50.81: ICD-10-CM

## 2020-06-27 LAB
ALBUMIN SERPL-MCNC: 3.2 G/DL (ref 3.5–5)
ALP SERPL-CCNC: 69 U/L (ref 38–126)
ALT SERPL-CCNC: 47 U/L (ref 4–49)
ANION GAP SERPL CALC-SCNC: 7 MMOL/L
APTT BLD: 28.7 SEC (ref 22–30)
AST SERPL-CCNC: 62 U/L (ref 17–59)
BUN SERPL-SCNC: 17 MG/DL (ref 9–20)
CALCIUM SPEC-MCNC: 8.1 MG/DL (ref 8.4–10.2)
CHLORIDE SERPL-SCNC: 100 MMOL/L (ref 98–107)
CO2 SERPL-SCNC: 25 MMOL/L (ref 22–30)
ERYTHROCYTE [DISTWIDTH] IN BLOOD BY AUTOMATED COUNT: 2.32 M/UL (ref 4.3–5.9)
ERYTHROCYTE [DISTWIDTH] IN BLOOD: 23.1 % (ref 11.5–15.5)
GLUCOSE SERPL-MCNC: 105 MG/DL (ref 74–99)
GRAN CASTS UR QL COMP ASSIST: 3 /LPF
HCT VFR BLD AUTO: 22.6 % (ref 39–53)
HGB BLD-MCNC: 7 GM/DL (ref 13–17.5)
HYALINE CASTS UR QL AUTO: 1 /LPF (ref 0–2)
INR PPP: 1.2 (ref ?–1.2)
MCH RBC QN AUTO: 30 PG (ref 25–35)
MCHC RBC AUTO-ENTMCNC: 30.8 G/DL (ref 31–37)
MCV RBC AUTO: 97.4 FL (ref 80–100)
PH UR: 6 [PH] (ref 5–8)
PLATELET # BLD AUTO: 217 K/UL (ref 150–450)
POTASSIUM SERPL-SCNC: 3.8 MMOL/L (ref 3.5–5.1)
PROT SERPL-MCNC: 7.7 G/DL (ref 6.3–8.2)
PROT UR QL: (no result)
PT BLD: 12.2 SEC (ref 9–12)
RBC UR QL: 2 /HPF (ref 0–5)
SODIUM SERPL-SCNC: 132 MMOL/L (ref 137–145)
SP GR UR: 1.03 (ref 1–1.03)
SQUAMOUS UR QL AUTO: <1 /HPF (ref 0–4)
UROBILINOGEN UR QL STRIP: 2 MG/DL (ref ?–2)
WBC # BLD AUTO: 0.6 K/UL (ref 3.8–10.6)
WBC # UR AUTO: 2 /HPF (ref 0–5)

## 2020-06-27 PROCEDURE — 71048 X-RAY EXAM CHEST 4+ VIEWS: CPT

## 2020-06-27 PROCEDURE — 83540 ASSAY OF IRON: CPT

## 2020-06-27 PROCEDURE — 87324 CLOSTRIDIUM AG IA: CPT

## 2020-06-27 PROCEDURE — 86850 RBC ANTIBODY SCREEN: CPT

## 2020-06-27 PROCEDURE — 82728 ASSAY OF FERRITIN: CPT

## 2020-06-27 PROCEDURE — 99285 EMERGENCY DEPT VISIT HI MDM: CPT

## 2020-06-27 PROCEDURE — 87040 BLOOD CULTURE FOR BACTERIA: CPT

## 2020-06-27 PROCEDURE — 82565 ASSAY OF CREATININE: CPT

## 2020-06-27 PROCEDURE — 71260 CT THORAX DX C+: CPT

## 2020-06-27 PROCEDURE — 36415 COLL VENOUS BLD VENIPUNCTURE: CPT

## 2020-06-27 PROCEDURE — 85610 PROTHROMBIN TIME: CPT

## 2020-06-27 PROCEDURE — 87046 STOOL CULTR AEROBIC BACT EA: CPT

## 2020-06-27 PROCEDURE — 85027 COMPLETE CBC AUTOMATED: CPT

## 2020-06-27 PROCEDURE — 80048 BASIC METABOLIC PNL TOTAL CA: CPT

## 2020-06-27 PROCEDURE — 74160 CT ABDOMEN W/CONTRAST: CPT

## 2020-06-27 PROCEDURE — 87205 SMEAR GRAM STAIN: CPT

## 2020-06-27 PROCEDURE — 81001 URINALYSIS AUTO W/SCOPE: CPT

## 2020-06-27 PROCEDURE — 87070 CULTURE OTHR SPECIMN AEROBIC: CPT

## 2020-06-27 PROCEDURE — 86901 BLOOD TYPING SEROLOGIC RH(D): CPT

## 2020-06-27 PROCEDURE — 85025 COMPLETE CBC W/AUTO DIFF WBC: CPT

## 2020-06-27 PROCEDURE — 83550 IRON BINDING TEST: CPT

## 2020-06-27 PROCEDURE — 74018 RADEX ABDOMEN 1 VIEW: CPT

## 2020-06-27 PROCEDURE — 82272 OCCULT BLD FECES 1-3 TESTS: CPT

## 2020-06-27 PROCEDURE — 71046 X-RAY EXAM CHEST 2 VIEWS: CPT

## 2020-06-27 PROCEDURE — 80053 COMPREHEN METABOLIC PANEL: CPT

## 2020-06-27 PROCEDURE — 85730 THROMBOPLASTIN TIME PARTIAL: CPT

## 2020-06-27 PROCEDURE — 86900 BLOOD TYPING SEROLOGIC ABO: CPT

## 2020-06-27 PROCEDURE — 76705 ECHO EXAM OF ABDOMEN: CPT

## 2020-06-27 PROCEDURE — 86920 COMPATIBILITY TEST SPIN: CPT

## 2020-06-27 PROCEDURE — 78226 HEPATOBILIARY SYSTEM IMAGING: CPT

## 2020-06-27 PROCEDURE — 74177 CT ABD & PELVIS W/CONTRAST: CPT

## 2020-06-27 PROCEDURE — 96365 THER/PROPH/DIAG IV INF INIT: CPT

## 2020-06-27 PROCEDURE — 83605 ASSAY OF LACTIC ACID: CPT

## 2020-06-27 PROCEDURE — 94760 N-INVAS EAR/PLS OXIMETRY 1: CPT

## 2020-06-27 PROCEDURE — 80202 ASSAY OF VANCOMYCIN: CPT

## 2020-06-27 PROCEDURE — 87045 FECES CULTURE AEROBIC BACT: CPT

## 2020-06-27 PROCEDURE — 87338 HPYLORI STOOL AG IA: CPT

## 2020-06-27 PROCEDURE — 94640 AIRWAY INHALATION TREATMENT: CPT

## 2020-06-27 PROCEDURE — 96372 THER/PROPH/DIAG INJ SC/IM: CPT

## 2020-06-27 RX ADMIN — FILGRASTIM-SNDZ SCH MCG: 480 INJECTION, SOLUTION INTRAVENOUS; SUBCUTANEOUS at 18:52

## 2020-06-27 RX ADMIN — CEFEPIME HYDROCHLORIDE SCH MLS/HR: 1 INJECTION, POWDER, FOR SOLUTION INTRAMUSCULAR; INTRAVENOUS at 21:34

## 2020-06-27 RX ADMIN — CEFAZOLIN SCH MLS/HR: 330 INJECTION, POWDER, FOR SOLUTION INTRAMUSCULAR; INTRAVENOUS at 16:52

## 2020-06-27 NOTE — ED
General Adult HPI





- General


Chief complaint: Fever


Stated complaint: Fever


Time Seen by Provider: 20 16:32


Source: patient, family, RN notes reviewed


Mode of arrival: ambulatory


Limitations: no limitations





- History of Present Illness


Initial comments: 





Patient is a pleasant 70-year-old male presenting to the emergency Department 

with complaints of fever.  Patient was just discharged from the hospital with 

neutropenic fever and pneumonia.  Patient also had low red cells and year 

transfusion.  Patient had temperature at home today of 103.9.  Patient did 

receive Tylenol less than 1 hour ago.  Patient complains of feeling achy and 

chilled.  Patient also has mild shortness of breath.  Patient does see Dr. Dos Santos 

and was recently on chemotherapy however this has been discontinued secondary to

not being successful.  Patient has myelodysplastic syndrome





- Related Data


                                  Previous Rx's











 Medication  Instructions  Recorded


 


Acyclovir 400 mg PO TID #90 tablet 20


 


Fluconazole [Diflucan] 100 mg PO DAILY #30 tab 20


 


Acetaminophen Tab [Tylenol] 650 mg PO Q6HR PRN  tab 20


 


Levofloxacin [Levaquin] 500 mg PO DAILY 3 Days #14 tab 20











                                    Allergies











Allergy/AdvReac Type Severity Reaction Status Date / Time


 


No Known Allergies Allergy   Verified 20 16:35














Review of Systems


ROS Statement: 


Those systems with pertinent positive or pertinent negative responses have been 

documented in the HPI.





ROS Other: All systems not noted in ROS Statement are negative.


Constitutional: Reports: fever, chills


Eyes: Denies: eye pain


ENT: Denies: ear pain


Respiratory: Reports: dyspnea.  Denies: cough


Cardiovascular: Denies: chest pain


Endocrine: Reports: fatigue


Gastrointestinal: Denies: abdominal pain


Genitourinary: Denies: dysuria


Musculoskeletal: Denies: back pain


Skin: Denies: rash


Neurological: Denies: weakness





Past Medical History


Past Medical History: Cancer, Skin Disorder


Additional Past Medical History / Comment(s): Gout, cellulitis to left leg? 

(patient states he is itchy, some scabs and scarring noted), chronic anemia, 

bone marrow cancer


History of Any Multi-Drug Resistant Organisms: None Reported


Past Surgical History: No Surgical Hx Reported


Additional Past Surgical History / Comment(s): surgery on rt leg and foot after 

injury age 3


Past Anesthesia/Blood Transfusion Reactions: No Reported Reaction


Past Psychological History: No Psychological Hx Reported


Smoking Status: Current every day smoker


Past Alcohol Use History: Daily


Past Drug Use History: None Reported





- Past Family History


  ** Mother


Family Medical History: Cancer





  ** Brother(s)


Family Medical History: Cancer





  ** Father


Additional Family Medical History / Comment(s): Father  at age 93 from old 

age.





General Exam


Limitations: no limitations


General appearance: alert, in no apparent distress


Head exam: Present: normocephalic


Eye exam: Present: normal appearance, PERRL


ENT exam: Present: normal oropharynx


Neck exam: Present: normal inspection


Respiratory exam: Present: normal lung sounds bilaterally


Cardiovascular Exam: Present: regular rate, normal rhythm


GI/Abdominal exam: Present: soft.  Absent: tenderness


Extremities exam: Present: normal inspection


Neurological exam: Present: alert


Psychiatric exam: Present: normal affect, normal mood


Skin exam: Present: normal color





Course


                                   Vital Signs











  20





  16:33 18:04


 


Temperature 101.2 F H 99.7 F H


 


Pulse Rate 81 70


 


Respiratory 16 18





Rate  


 


Blood Pressure 126/69 107/64


 


O2 Sat by Pulse 98 95





Oximetry  














Medical Decision Making





- Medical Decision Making





Patient reevaluated and resting comfortably sitting up in bed.  Patient and 

family updated on results and plan.  Case was discussed in detail with Dr. Steiner, who will admit covering for Dr. Medrano.  She does recommend cefepime 

and vancomycin and consult Dr. Mcneil.  Case was also discussed with Dr. Dos Santos who 

also requests Neupogen 480 daily.





- Lab Data


Result diagrams: 


                                 20 17:00





                                 20 16:55


                                   Lab Results











  20 Range/Units





  16:55 16:55 16:55 


 


WBC     (3.8-10.6)  k/uL


 


RBC     (4.30-5.90)  m/uL


 


Hgb     (13.0-17.5)  gm/dL


 


Hct     (39.0-53.0)  %


 


MCV     (80.0-100.0)  fL


 


MCH     (25.0-35.0)  pg


 


MCHC     (31.0-37.0)  g/dL


 


RDW     (11.5-15.5)  %


 


Plt Count     (150-450)  k/uL


 


Differential Comment     


 


Manual Slide Review     


 


Hypochromasia     


 


Poikilocytosis (manual     


 


Anisocytosis     


 


Macrocytosis     


 


Target Cells     


 


PT  12.2 H    (9.0-12.0)  sec


 


INR  1.2 H    (<1.2)  


 


APTT  28.7    (22.0-30.0)  sec


 


Sodium   132 L   (137-145)  mmol/L


 


Potassium   3.8   (3.5-5.1)  mmol/L


 


Chloride   100   ()  mmol/L


 


Carbon Dioxide   25   (22-30)  mmol/L


 


Anion Gap   7   mmol/L


 


BUN   17   (9-20)  mg/dL


 


Creatinine   0.82   (0.66-1.25)  mg/dL


 


Est GFR (CKD-EPI)AfAm   >90   (>60 ml/min/1.73 sqM)  


 


Est GFR (CKD-EPI)NonAf   88   (>60 ml/min/1.73 sqM)  


 


Glucose   105 H   (74-99)  mg/dL


 


Plasma Lactic Acid Geoffrey    1.1  (0.7-2.0)  mmol/L


 


Calcium   8.1 L   (8.4-10.2)  mg/dL


 


Total Bilirubin   0.9   (0.2-1.3)  mg/dL


 


AST   62 H   (17-59)  U/L


 


ALT   47   (4-49)  U/L


 


Alkaline Phosphatase   69   ()  U/L


 


Total Protein   7.7   (6.3-8.2)  g/dL


 


Albumin   3.2 L   (3.5-5.0)  g/dL














  20 Range/Units





  17:00 


 


WBC  0.6 L*  (3.8-10.6)  k/uL


 


RBC  2.32 L  (4.30-5.90)  m/uL


 


Hgb  7.0 L  (13.0-17.5)  gm/dL


 


Hct  22.6 L  (39.0-53.0)  %


 


MCV  97.4  (80.0-100.0)  fL


 


MCH  30.0  (25.0-35.0)  pg


 


MCHC  30.8 L  (31.0-37.0)  g/dL


 


RDW  23.1 H  (11.5-15.5)  %


 


Plt Count  217  D  (150-450)  k/uL


 


Differential Comment    


 


Manual Slide Review  Performed  


 


Hypochromasia  Slight  


 


Poikilocytosis (manual  Present  


 


Anisocytosis  Moderate  


 


Macrocytosis  Moderate  


 


Target Cells  Present  


 


PT   (9.0-12.0)  sec


 


INR   (<1.2)  


 


APTT   (22.0-30.0)  sec


 


Sodium   (137-145)  mmol/L


 


Potassium   (3.5-5.1)  mmol/L


 


Chloride   ()  mmol/L


 


Carbon Dioxide   (22-30)  mmol/L


 


Anion Gap   mmol/L


 


BUN   (9-20)  mg/dL


 


Creatinine   (0.66-1.25)  mg/dL


 


Est GFR (CKD-EPI)AfAm   (>60 ml/min/1.73 sqM)  


 


Est GFR (CKD-EPI)NonAf   (>60 ml/min/1.73 sqM)  


 


Glucose   (74-99)  mg/dL


 


Plasma Lactic Acid Geoffrey   (0.7-2.0)  mmol/L


 


Calcium   (8.4-10.2)  mg/dL


 


Total Bilirubin   (0.2-1.3)  mg/dL


 


AST   (17-59)  U/L


 


ALT   (4-49)  U/L


 


Alkaline Phosphatase   ()  U/L


 


Total Protein   (6.3-8.2)  g/dL


 


Albumin   (3.5-5.0)  g/dL














- Radiology Data


Radiology results: image reviewed (Chest x-ray shows subtle infiltrate right 

lobe.)





Disposition


Clinical Impression: 


 Neutropenic fever, Pneumonia





Disposition: ADMITTED AS IP TO THIS HOSP


Is patient prescribed a controlled substance at d/c from ED?: No


Referrals: 


Garcia Luu DO [Primary Care Provider] - 1-2 days


Decision Time: 18:18

## 2020-06-27 NOTE — XR
EXAMINATION TYPE: XR chest 2V

 

DATE OF EXAM: 6/27/2020

 

COMPARISON: 6/19/2020

 

HISTORY: Fever

 

TECHNIQUE:

 

FINDINGS: Heart and mediastinum are normal. There is poorly marginated 1.7 cm infiltrate in the right
 upper lobe. The other lung fields are clear. There is pulmonary hyperinflation and flattening of the
 diaphragm. Bony thorax is intact.

 

IMPRESSION: Poorly marginated right upper lobe infiltrate. Shallow oblique views recommended to confi
rm or exclude a pulmonary nodule. COPD unchanged.

## 2020-06-28 LAB
ANION GAP SERPL CALC-SCNC: 10 MMOL/L
BUN SERPL-SCNC: 16 MG/DL (ref 9–20)
CALCIUM SPEC-MCNC: 8 MG/DL (ref 8.4–10.2)
CELLS COUNTED: 100
CHLORIDE SERPL-SCNC: 103 MMOL/L (ref 98–107)
CO2 SERPL-SCNC: 23 MMOL/L (ref 22–30)
EOSINOPHIL # BLD MANUAL: 0.1 K/UL (ref 0–0.7)
ERYTHROCYTE [DISTWIDTH] IN BLOOD BY AUTOMATED COUNT: 2.5 M/UL (ref 4.3–5.9)
ERYTHROCYTE [DISTWIDTH] IN BLOOD: 23.2 % (ref 11.5–15.5)
GLUCOSE SERPL-MCNC: 93 MG/DL (ref 74–99)
HCT VFR BLD AUTO: 25.3 % (ref 39–53)
HGB BLD-MCNC: 8.1 GM/DL (ref 13–17.5)
LYMPHOCYTES # BLD MANUAL: 0.76 K/UL (ref 1–4.8)
MCH RBC QN AUTO: 32.3 PG (ref 25–35)
MCHC RBC AUTO-ENTMCNC: 31.9 G/DL (ref 31–37)
MCV RBC AUTO: 101.3 FL (ref 80–100)
MONOCYTES # BLD MANUAL: 0.06 K/UL (ref 0–1)
NEUTROPHILS NFR BLD MANUAL: 14 %
NEUTS SEG # BLD MANUAL: 0.1 K/UL (ref 1.3–7.7)
PLATELET # BLD AUTO: 253 K/UL (ref 150–450)
POTASSIUM SERPL-SCNC: 4.6 MMOL/L (ref 3.5–5.1)
SODIUM SERPL-SCNC: 136 MMOL/L (ref 137–145)
WBC # BLD AUTO: 1.1 K/UL (ref 3.8–10.6)

## 2020-06-28 RX ADMIN — PANTOPRAZOLE SODIUM SCH MG: 40 INJECTION, POWDER, FOR SOLUTION INTRAVENOUS at 16:43

## 2020-06-28 RX ADMIN — SODIUM CHLORIDE SCH MLS/HR: 9 INJECTION, SOLUTION INTRAVENOUS at 17:51

## 2020-06-28 RX ADMIN — CEFEPIME HYDROCHLORIDE SCH MLS/HR: 1 INJECTION, POWDER, FOR SOLUTION INTRAMUSCULAR; INTRAVENOUS at 08:38

## 2020-06-28 RX ADMIN — ACYCLOVIR SCH MG: 200 CAPSULE ORAL at 20:37

## 2020-06-28 RX ADMIN — ACETAMINOPHEN PRN MG: 325 TABLET, FILM COATED ORAL at 14:14

## 2020-06-28 RX ADMIN — METRONIDAZOLE SCH MLS/HR: 500 INJECTION, SOLUTION INTRAVENOUS at 16:42

## 2020-06-28 RX ADMIN — BUDESONIDE AND FORMOTEROL FUMARATE DIHYDRATE SCH PUFF: 80; 4.5 AEROSOL RESPIRATORY (INHALATION) at 21:20

## 2020-06-28 RX ADMIN — CEFAZOLIN SCH: 330 INJECTION, POWDER, FOR SOLUTION INTRAMUSCULAR; INTRAVENOUS at 00:58

## 2020-06-28 RX ADMIN — ACETAMINOPHEN PRN MG: 325 TABLET, FILM COATED ORAL at 00:00

## 2020-06-28 RX ADMIN — CEFAZOLIN SCH MLS/HR: 330 INJECTION, POWDER, FOR SOLUTION INTRAMUSCULAR; INTRAVENOUS at 14:14

## 2020-06-28 RX ADMIN — ACETAMINOPHEN PRN MG: 325 TABLET, FILM COATED ORAL at 20:37

## 2020-06-28 RX ADMIN — ACETAMINOPHEN PRN MG: 325 TABLET, FILM COATED ORAL at 07:48

## 2020-06-28 RX ADMIN — FILGRASTIM-SNDZ SCH MCG: 480 INJECTION, SOLUTION INTRAVENOUS; SUBCUTANEOUS at 07:48

## 2020-06-28 RX ADMIN — SODIUM CHLORIDE SCH MLS/HR: 9 INJECTION, SOLUTION INTRAVENOUS at 06:15

## 2020-06-28 RX ADMIN — METHYLPREDNISOLONE SODIUM SUCCINATE SCH MG: 125 INJECTION, POWDER, FOR SOLUTION INTRAMUSCULAR; INTRAVENOUS at 16:43

## 2020-06-28 NOTE — P.HPIM
History of Present Illness


H&P Date: 20


Chief Complaint: Fever, epigastric pain, cough








This is a 70-year-old patient of Dr. Luu with a past medical history of 

anemia, gout, tobacco use and dependence, daily alcohol use.  And cancer.  

Patient is unsure of the type of cancer that he has.  He has been receiving 

chemotherapy and blood transfusions.  He is under care with Dr. Dos Santos.  Patient 

presented yesterday for evaluation of fever and generalized weakness.  Patient a

transfusion yesterday morning at approximately 9 AM.  He was noted to have a 

fever during the afternoon.  Patient states that his most recent chemotherapy 

was approximately 2 weeks ago.  He denies any pain or at this time.  Denies any 

vomiting or diarrhea.  Patient states that he has been short of breath recently 

with any activity.  Denies any cough.  Patient is a current smoker.  Patient was

discharged , and comes in again to the emergency room with the same 

problem, fever, failure of a cycle of year, and Levaquin, for which the regimen 

was complied on by family.  Patient was hesitant on going to hospice, and is not

feeling ready to be in a hospice program yet.





 Patient was admitted from  until , seen by oncology ID, discharge

med at that time a cycle of year Levaquin, along with other maintenance meds, he

received to admission 1 unit of packed red blood cell, as well as colony 

simulating factor, at that time he received Vanco supper, there was some 

suspicion of either a wedge like abnormality in the spleen, during his last CAT 

scan.  Fever persists at home, and started 24 hours after discharge.  Patient is

coughing, has diarrhea, 4 times watery, no blood, patient denies any aspiration 

events, patient continues to smoke, patient started on cefepime, and vancomycin,

Flagyl was added started secondary diarrhea.  Cultures were obtained ID consult 

oncology consult, patient has COPD exacerbation as well, as well as an 

orthopedic fever, and a new right upper lobe infiltrate, malignancy cannot be r

uled out, CT chest to be done, with IV contrast.  Urinalysis negative.








Review of Systems


Constitutional: Reports anorexia, Reports chronic pain, Reports fatigue, Reports

fever, Reports night sweats, Reports poor appetite


Ears, nose, mouth and throat: Reports as per HPI, Denies ant. neck pain, Denies 

bleeding gums, Denies dental pain, Denies dysphagia, Denies epistaxis, Denies 

headache, Denies hoarseness, Denies mouth pain, Denies nasal congestion, Denies 

nasal discharge, Denies neck fullness/pressure, Denies neck lump, Denies nose 

pain, Denies odynophagia, Denies post-nasal drip, Denies sinus pain, Denies 

sinus pressure, Denies swelling in mouth, Denies swelling in throat, Denies sore

throat, Denies vertigo, Denies voice changes


Cardiovascular: Reports as per HPI, Reports dyspnea on exertion


Respiratory: Reports as per HPI, Reports dyspnea, Reports wheezing, Denies 

congestion, Denies cough, Denies cough with sputum, Denies excessive sputum, 

Denies hemoptysis, Denies home oxygen, Denies pain, Denies pain on inspiration, 

Denies pleurisy, Denies respiratory infections, Denies sleep apnea, Denies 

snoring


Gastrointestinal: Reports as per HPI, Reports diarrhea, Reports heartburn, 

Reports indigestion, Denies abdominal pain, Denies belching, Denies bloating, 

Denies BRBPR, Denies change in bowel habits, Denies coffee ground emesis, Denies

constipation, Denies dyspepsia, Denies early satiety, Denies excessive gas, 

Denies hematemesis, Denies hematochezia, Denies jaundice, Denies lactose 

intolerance, Denies loss of appetite, Denies melena, Denies nausea, Denies 

vomiting


Genitourinary: Reports as per HPI


Musculoskeletal: Reports as per HPI, Reports muscle weakness, Denies arm 

numbness/tingling, Denies atrophy, Denies fractures, Denies frequent falls, 

Denies gait dysfunction, Denies hot joints, Denies leg numbness/tingling, Denies

limitation of motion, Denies loss of height, Denies low back pain, Denies 

morning stiffness, Denies muscle cramps, Denies myalgias, Denies neck pain, 

Denies neck stiffness, Denies prior amputations, Denies redness of joints, 

Denies shooting arm pain, Denies shooting leg pain


Integumentary: Reports as per HPI, Denies acne, Denies boils, Denies brittle 

nails, Denies change in hair/nails, Denies color changes, Denies darkening of 

skin, Denies depigmentation, Denies dryness, Denies foot/leg ulcers, Denies 

growths, Denies hirsutism, Denies lesions, Denies onychomycosis, Denies 

pruritus, Denies rash, Denies sores, Denies striae, Denies unusual bruising, 

Denies wounds


Neurological: Reports as per HPI, Denies aphasia, Denies ataxia, Denies balance 

difficulties, Denies burning pain, Denies change in mentation, Denies change in 

smell/taste, Denies change in speech, Denies confusion, Denies convulsions, Tylor

es double vision, Denies gait dysfunction, Denies head injury, Denies headaches,

Denies hearing difficulties, Denies lack of coordination, Denies loss of vision,

Denies memory loss, Denies migraines, Denies motor disturbance, Denies numbness,

Denies paralysis, Denies paresthesias, Denies seizures, Denies sensory deficit, 

Denies spasticity, Denies syncope, Denies tic, Denies tingling, Denies transient

paralysis, Denies tremors, Denies vertigo, Denies weakness, Denies visual 

changes


Psychiatric: Reports as per HPI, Denies anhedonia, Denies anxiety, Denies 

anxiety attacks, Denies change in appetite, Denies change in libido, Denies 

change in sleep habits, Denies confusion, Denies depression, Denies difficulty 

concentrating, Denies disorientation, Denies hallucinations, Denies 

hopelessness, Denies hypersomnia, Denies insomnia, Denies irritability, Denies 

memory loss, Denies mood swings, Denies paranoia, Denies sadness/tearfulness, 

Denies sleep disturbances, Denies suicidal ideation


Endocrine: Reports as per HPI, Denies cold intolerance, Denies deepening of the 

voice, Denies excessive sweating, Denies excessive thirst, Denies fatigue, Den

ies flushing, Denies heat intolerance, Denies high blood sugars, Denies increase

in ring/shoe/hat size, Denies low blood sugars, Denies nocturia, Denies 

palpitations, Denies polydipsia, Denies polyphagia, Denies polyuria, Denies 

proptosis, Denies recent glucocorticoid use, Denies thyroid mass, Denies weight 

change


Hematologic/Lymphatic: Reports as per HPI


Allergic/Immunologic: Reports as per HPI, Reports wheezing, Denies allergic 

rhinitis, Denies anaphylaxis, Denies angioedema, Denies gluten intolerance, 

Denies persistent infections, Denies seasonal allergies, Denies urticaria





Past Medical History


Past Medical History: Cancer, Skin Disorder


Additional Past Medical History / Comment(s): Gout, cellulitis to left leg? 

(patient states he is itchy, some scabs and scarring noted), chronic anemia, 

bone marrow cancer


History of Any Multi-Drug Resistant Organisms: None Reported


Past Surgical History: No Surgical Hx Reported


Additional Past Surgical History / Comment(s): surgery on rt leg and foot after 

injury age 3


Past Anesthesia/Blood Transfusion Reactions: No Reported Reaction


Past Psychological History: No Psychological Hx Reported


Smoking Status: Current every day smoker


Past Alcohol Use History: Daily


Additional Past Alcohol Use History / Comment(s): Patient states he smokes 1/2 

ppd.  Patient drinks 4-6 beers per day for many years and cut down to 1-2 beers 

per day for the past 2 months.  Patient lives at home with his wife.


Past Drug Use History: None Reported





- Past Family History


  ** Mother


Family Medical History: Cancer





  ** Brother(s)


Family Medical History: Cancer





  ** Father


Additional Family Medical History / Comment(s): Father  at age 93 from old 

age.





Medications and Allergies


                                Home Medications











 Medication  Instructions  Recorded  Confirmed  Type


 


Acyclovir 400 mg PO TID #90 tablet 20 Rx


 


Fluconazole [Diflucan] 100 mg PO DAILY #30 tab 20 Rx


 


Acetaminophen Tab [Tylenol] 650 mg PO Q6HR PRN  tab 20 Rx


 


Levofloxacin [Levaquin] 500 mg PO DAILY 3 Days #14 tab 20 Rx


 


Multivitamins, Thera [Multivitamin 1 tab PO DAILY 20 History





(formulary)]    








                                    Allergies











Allergy/AdvReac Type Severity Reaction Status Date / Time


 


No Known Allergies Allergy   Verified 20 19:12














Physical Exam


Vitals: 


                                   Vital Signs











  Temp Pulse Pulse Resp BP BP Pulse Ox


 


 20 09:47  99 F      


 


 20 08:44  103 F H      


 


 20 07:48  102.3 F H   98  19   142/69  93 L


 


 20 06:16  100.5 F H      


 


 20 04:35  101 F H      


 


 20 02:55  102.1 F H   84    105/57  94 L


 


 20 22:40  101 F H      


 


 20 20:42  98.8 F   75  18   137/72  96


 


 20 19:27  99.2 F  72   18  110/63   97


 


 20 18:04  99.7 F H  70   18  107/64   95


 


 20 16:33  101.2 F H  81   16  126/69   98








                                Intake and Output











 20





 22:59 06:59 14:59


 


Intake Total 240  900


 


Balance 240  900


 


Intake:   


 


  IV   900


 


    Cefepime 1 gm In Sodium   50





    Chloride 0.9% 50 ml @ 100   





    mls/hr IVPB Q12HR [facility]   





    #:[id number]   


 


    Sodium Chloride 0.9% 1,   600





    000 ml @ 75 mls/hr IV .   





    [id number] [facility] Rx#:[id number]   


 


    Vancomycin 1,000 mg In   250





    Sodium Chloride 0.9% 250   





    ml @ 125 mls/hr IVPB Q12H   





    [facility] Rx#:[id number]   


 


  Oral 240  


 


Other:   


 


  # Voids 2  


 


  Weight 60.328 kg  60.328 kg














- Constitutional


General appearance: cooperative, no acute distress, thin





- EENT


Eyes: anicteric sclerae, EOMI, PERRLA, dentition normal, normal appearance


ENT: NA/AT, normal oropharynx





- Neck


Neck: normal ROM





- Respiratory


Respiratory: bilateral: wheezing, negative: CTA, diminished, dullness





- Cardiovascular


Rhythm: regular


Heart sounds: normal: S1


Abnormal Heart Sounds: no systolic murmur, no diastolic murmur, no rub, no S3 

Gallop, no S4 Gallop, no click, no other





- Gastrointestinal


General gastrointestinal: normal bowel sounds, soft





- Integumentary


Integumentary: decreased turgor, normal





- Neurologic


Neurologic: CNII-XII intact





- Musculoskeletal


Musculoskeletal: gait normal, strength equal bilaterally





- Psychiatric


Psychiatric: A&O x's 3, appropriate affect, intact judgment & insight





Results


CBC & Chem 7: 


                                 20 07:24





                                 20 07:24


Labs: 


                  Abnormal Lab Results - Last 24 Hours (Table)











  20 Range/Units





  16:55 16:55 17:00 


 


WBC    0.6 L*  (3.8-10.6)  k/uL


 


RBC    2.32 L  (4.30-5.90)  m/uL


 


Hgb    7.0 L  (13.0-17.5)  gm/dL


 


Hct    22.6 L  (39.0-53.0)  %


 


MCV     (80.0-100.0)  fL


 


MCHC    30.8 L  (31.0-37.0)  g/dL


 


RDW    23.1 H  (11.5-15.5)  %


 


Neutrophils # (Manual)     (1.3-7.7)  k/uL


 


Lymphocytes # (Manual)     (1.0-4.8)  k/uL


 


Macrocytosis     


 


PT  12.2 H    (9.0-12.0)  sec


 


INR  1.2 H    (<1.2)  


 


Sodium   132 L   (137-145)  mmol/L


 


Glucose   105 H   (74-99)  mg/dL


 


Calcium   8.1 L   (8.4-10.2)  mg/dL


 


AST   62 H   (17-59)  U/L


 


Albumin   3.2 L   (3.5-5.0)  g/dL


 


Urine Protein     (Negative)  


 


Urine Blood     (Negative)  


 


Urine Mucus     (None)  /hpf














  20 Range/Units





  17:00 07:24 07:24 


 


WBC   1.1 L*   (3.8-10.6)  k/uL


 


RBC   2.50 L   (4.30-5.90)  m/uL


 


Hgb   8.1 L   (13.0-17.5)  gm/dL


 


Hct   25.3 L   (39.0-53.0)  %


 


MCV   101.3 H   (80.0-100.0)  fL


 


MCHC     (31.0-37.0)  g/dL


 


RDW   23.2 H   (11.5-15.5)  %


 


Neutrophils # (Manual)   0.10 L*   (1.3-7.7)  k/uL


 


Lymphocytes # (Manual)   0.76 L   (1.0-4.8)  k/uL


 


Macrocytosis   Marked A   


 


PT     (9.0-12.0)  sec


 


INR     (<1.2)  


 


Sodium    136 L  (137-145)  mmol/L


 


Glucose     (74-99)  mg/dL


 


Calcium    8.0 L  (8.4-10.2)  mg/dL


 


AST     (17-59)  U/L


 


Albumin     (3.5-5.0)  g/dL


 


Urine Protein  3+ H    (Negative)  


 


Urine Blood  Moderate H    (Negative)  


 


Urine Mucus  Rare H    (None)  /hpf








                               Laboratory Results











WBC  1.1 k/uL (3.8-10.6)  L*  20  07:24    


 


RBC  2.50 m/uL (4.30-5.90)  L  20  07:24    


 


Hgb  8.1 gm/dL (13.0-17.5)  L  20  07:24    


 


Hct  25.3 % (39.0-53.0)  L  20  07:24    


 


MCV  101.3 fL (80.0-100.0)  H  20  07:24    


 


MCH  32.3 pg (25.0-35.0)   20  07:24    


 


MCHC  31.9 g/dL (31.0-37.0)   20  07:24    


 


RDW  23.2 % (11.5-15.5)  H  20  07:24    


 


Plt Count  253 k/uL (150-450)   20  07:24    


 


Neutrophils % (Manual)  14 %  20  07:24    


 


Band Neutrophils %  3 %  20  07:24    


 


Lymphocytes % (Manual)  69 %  20  07:24    


 


Monocytes % (Manual)  5 %  20  07:24    


 


Eosinophils % (Manual)  9 %  20  07:24    


 


Neutrophils # (Manual)  0.10 k/uL (1.3-7.7)  L*  20  07:24    


 


Lymphocytes # (Manual)  0.76 k/uL (1.0-4.8)  L  20  07:24    


 


Monocytes # (Manual)  0.06 k/uL (0-1.0)   20  07:24    


 


Eosinophils # (Manual)  0.10 k/uL (0-0.7)   20  07:24    


 


Nucleated RBCs  0 /100 WBC (0-0)   20  07:24    


 


Differential Comment     20  17:00    


 


Manual Slide Review  Performed   20  07:24    


 


Large Platelets  Present   20  07:24    


 


Hypochromasia  Marked   20  07:24    


 


Poikilocytosis (manual  Present   20  17:00    


 


Anisocytosis  Moderate   20  07:24    


 


Macrocytosis  Marked  A  20  07:24    


 


Target Cells  Present   20  07:24    


 


PT  12.2 sec (9.0-12.0)  H  20  16:55    


 


INR  1.2  (<1.2)  H  20  16:55    


 


APTT  28.7 sec (22.0-30.0)   20  16:55    


 


Sodium  136 mmol/L (137-145)  L  20  07:24    


 


Potassium  4.6 mmol/L (3.5-5.1)   20  07:24    


 


Chloride  103 mmol/L ()   20  07:24    


 


Carbon Dioxide  23 mmol/L (22-30)   20  07:24    


 


Anion Gap  10 mmol/L  20  07:24    


 


BUN  16 mg/dL (9-20)   20  07:24    


 


Creatinine  0.82 mg/dL (0.66-1.25)   20  07:24    


 


Est GFR (CKD-EPI)AfAm  >90  (>60 ml/min/1.73 sqM)   20  07:24    


 


Est GFR (CKD-EPI)NonAf  88  (>60 ml/min/1.73 sqM)   20  07:24    


 


Glucose  93 mg/dL (74-99)   20  07:24    


 


Plasma Lactic Acid Geoffrey  1.1 mmol/L (0.7-2.0)   20  16:55    


 


Calcium  8.0 mg/dL (8.4-10.2)  L  20  07:24    


 


Total Bilirubin  0.9 mg/dL (0.2-1.3)   20  16:55    


 


AST  62 U/L (17-59)  H  20  16:55    


 


ALT  47 U/L (4-49)   20  16:55    


 


Alkaline Phosphatase  69 U/L ()   20  16:55    


 


Total Protein  7.7 g/dL (6.3-8.2)   20  16:55    


 


Albumin  3.2 g/dL (3.5-5.0)  L  20  16:55    


 


Urine Color  Yellow   20  17:00    


 


Urine Appearance  Cloudy  (Clear)   20  17:00    


 


Urine pH  6.0  (5.0-8.0)   20  17:00    


 


Ur Specific Gravity  1.030  (1.001-1.035)   20  17:00    


 


Urine Protein  3+  (Negative)  H  20  17:00    


 


Urine Glucose (UA)  Negative  (Negative)   20  17:00    


 


Urine Ketones  Negative  (Negative)   20  17:00    


 


Urine Blood  Moderate  (Negative)  H  20  17:00    


 


Urine Nitrite  Negative  (Negative)   20  17:00    


 


Urine Bilirubin  Negative  (Negative)   20  17:00    


 


Urine Urobilinogen  2.0 mg/dL (<2.0)   20  17:00    


 


Ur Leukocyte Esterase  Negative  (Negative)   20  17:00    


 


Urine RBC  2 /hpf (0-5)   20  17:00    


 


Urine WBC  2 /hpf (0-5)   20  17:00    


 


Ur Squamous Epith Cells  <1 /hpf (0-4)   20  17:00    


 


Hyaline Casts  1 /lpf (0-2)   20  17:00    


 


Granular Casts  3 /lpf (0)   20  17:00    


 


Urine Mucus  Rare /hpf (None)  H  20  17:00    














Thrombosis Risk Factor Assmnt





- DVT/VTE Prophylaxis


DVT/VTE Prophylaxis: Pharmacologic Prophylaxis ordered





- Choose All That Apply


Any of the Below Risk Factors Present?: No


Each Factor Represents 1 point: Abnormal pulmonary function (COPD)


Other Risk Factors: Yes


Each Risk Factor Represents 2 Points: Age 61-74 years, Malignancy


Other congenital or acquired thrombophilia - If yes, enter type in comment: No


Thrombosis Risk Factor Assessment Total Risk Factor Score: 5


Thrombosis Risk Factor Assessment Level: High Risk





Assessment and Plan


Plan: 





Assessment and plan


1.  Neutropenic fever with recurrent admission, sepsis wit ailure of Levaquin 

prior to admission.  Continue cefepime and vancomycin.  ID and oncology consult 

  Blood culture repeated  , has diarrhea, check for C. diff colitis 


, Previous imaging studies  during last admission, shows 1.2 cm kidney cyst,

not  abscess, wedge-shaped hypodensity in spleen, related to either infectious 

and inflammatory against ischemic etiology, neoplasm needs to be considered, 

trace left pelvic ascites moderate wall thickening gastroduodenal junction, 

check stools for H pylori as patient has persistent epigastric pain, cxr right 

infiltrate vs nodule IV Flagyl admitted for diarrhea, O&P obtained, as well as 

stool cultures





2.  Poorly marginated right upper lobe infiltrate, 1.7 cm, CT of the chest, to 

evaluate for pulmonary nodule against pneumonia, IV antibiotics,





3.  Persistent epigastric pain check for H. pylori, possible abnormal thickening

in the gastroduodenal junction, not an abscess, on PPI offered,





4.  COPD exacerbation currently smoker nebulized treatments, albuterol 

Pulmicort, low-dose steroids 40 mg every 6 hours short bursts, patient was 

advised to quit smoking still, and recent pro-calcitonin still elevated, IV 

antibiotics initiated, pancultures sputum cultures if available,





5 Chronic anemia.  See above.  Status post transfusion of 1 unit of packed RBCs,

stable.  Continue Zarxio.





6  MDS with ringed sideroblasts see above





7  Tobacco use and dependence





8  Alcohol abuse, stable





9 Diarrhea, with ongoing antibiotic for sepsis, Flagyl added to regimen of 

cefepime Jovanni, C. diff toxin consult with ID





10  GI prophylaxis.  Pantoprazole 40 mg IV daily





Severe Malnutrition, with protein calorie malnutrition, insulin light, 3 times a

day with meals





8.  DVT prophylaxis.  Ambulation





9.  COVID-19 infection not present





Discharge plan: Home without home care

## 2020-06-28 NOTE — P.CONS
History of Present Illness





- Reason for Consult


Consult date: 20


Febrile neutropenia


Requesting physician: Cheryle Steiner





- Chief Complaint


Fever x 1 day





- History of Present Illness


Patient is a 70-year-old  male with a past medical history significant 

for myelodysplastic syndrome in this patient currently on chemotherapy he was 

recently admitted to this facility with febrile neutropenia and he did have 

extensive workup as well as aggressive IV antibiotic therapy in the abdominal 

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 

flexure and the patient was on cefepime and Flagyl with overall resolution of 

his fever however the patient was discharged home on 2020 with plan for 

hospice oriented care and the patient was sent home on no antibiotics, patient 

has been brought back to the hospital within 48 hour discharge with concern for 

recurrent fever with rigors and chills and the patient said he was unable to get

her fever and with the antipyretics, patient denies having any headache or URI 

symptoms, denies any chest pain or shortness with minimal cough no nausea no 

vomiting or significant abdominal pain or any diarrhea, patient has been running

a fever of 103F, he did have a leukopenia and neutropenia, UA has been 

negative, patient did have a CT of the chest abdomen and pelvis which did shows 

multiple pulmonary nodules in addition splenic infarct and some perinephric 

straining patient is currently on cefepime and vancomycin infection disease was 

consulted for further management of antibiotic therapy








Review of Systems


Positive point has been  mentioned in the HPI rest of the systems are negative








Past Medical History


Past Medical History: Cancer, Skin Disorder


Additional Past Medical History / Comment(s): Gout, cellulitis to left leg? 

(patient states he is itchy, some scabs and scarring noted), chronic anemia, 

bone marrow cancer


History of Any Multi-Drug Resistant Organisms: None Reported


Past Surgical History: No Surgical Hx Reported


Additional Past Surgical History / Comment(s): surgery on rt leg and foot after 

injury age 3


Past Anesthesia/Blood Transfusion Reactions: No Reported Reaction


Past Psychological History: No Psychological Hx Reported


Smoking Status: Current every day smoker


Past Alcohol Use History: Daily


Additional Past Alcohol Use History / Comment(s): Patient states he smokes 1/2 

ppd.  Patient drinks 4-6 beers per day for many years and cut down to 1-2 beers 

per day for the past 2 months.  Patient lives at home with his wife.


Past Drug Use History: None Reported





- Past Family History


  ** Mother


Family Medical History: Cancer





  ** Brother(s)


Family Medical History: Cancer





  ** Father


Additional Family Medical History / Comment(s): Father  at age 93 from old 

age.





Medications and Allergies


                                Home Medications











 Medication  Instructions  Recorded  Confirmed  Type


 


Acyclovir 400 mg PO TID #90 tablet 20 Rx


 


Fluconazole [Diflucan] 100 mg PO DAILY #30 tab 20 Rx


 


Acetaminophen Tab [Tylenol] 650 mg PO Q6HR PRN  tab 20 Rx


 


Levofloxacin [Levaquin] 500 mg PO DAILY 3 Days #14 tab 20 Rx


 


Multivitamins, Thera [Multivitamin 1 tab PO DAILY 20 History





(formulary)]    








                                    Allergies











Allergy/AdvReac Type Severity Reaction Status Date / Time


 


No Known Allergies Allergy   Verified 20 19:12














Physical Exam


Vitals: 


                                   Vital Signs











  Temp Pulse Resp BP Pulse Ox


 


 20 20:09  98.8 F  69  18  97/55  95


 


 20 16:01  101.6 F H    


 


 20 14:10  103.2 F H  93  16  137/62  92 L


 


 20 09:47  99 F    


 


 20 08:44  103 F H    


 


 20 07:48  102.3 F H  98  19  142/69  93 L


 


 20 06:16  100.5 F H    


 


 20 04:35  101 F H    


 


 20 02:55  102.1 F H  84   105/57  94 L


 


 20 22:40  101 F H    


 


 20 20:42  98.8 F  75  18  137/72  96








                                Intake and Output











 20





 06:59 14:59 22:59


 


Intake Total  900 


 


Balance  900 


 


Intake:   


 


  IV  900 


 


    Cefepime 1 gm In Sodium  50 





    Chloride 0.9% 50 ml @ 100   





    mls/hr IVPB Q12HR [facility]   





    #:[id number]   


 


    Sodium Chloride 0.9% 1,  600 





    000 ml @ 75 mls/hr IV .   





    [id number] [facility] Rx#:[id number]   


 


    Vancomycin 1,000 mg In  250 





    Sodium Chloride 0.9% 250   





    ml @ 125 mls/hr IVPB Q12H   





    [facility] Rx#:[id number]   


 


Other:   


 


  Weight  60.328 kg 











GENERAL DESCRIPTION: An elderly male up in bed, no distress. No tachypnea or 

accessory muscle of respiration use.


HEENT: Shows Pallor , no scleral icterus. Oral mucous membrane is dry. No 

pharyngeal erythema or thrush


NECK: Trachea central, no thyromegaly.


LUNGS: Unlabored breathing. Clear to auscultation anteriorly. No wheeze or 

crackle.


HEART: S1, S2, regular rate and rhythm. No loud murmur


ABDOMEN: Soft, no tenderness , guarding or rigidity, no organomegaly


EXTREMITIES: No edema of feet.


SKIN: No rash, no masses palpable.


NEUROLOGICAL: The patient is awake, alert, oriented x3, mood and affect normal.

















Results


CBC & Chem 7: 


                                 20 07:24





                                 20 07:24


Labs: 


                  Abnormal Lab Results - Last 24 Hours (Table)











  20 Range/Units





  07:24 07:24 


 


WBC  1.1 L*   (3.8-10.6)  k/uL


 


RBC  2.50 L   (4.30-5.90)  m/uL


 


Hgb  8.1 L   (13.0-17.5)  gm/dL


 


Hct  25.3 L   (39.0-53.0)  %


 


MCV  101.3 H   (80.0-100.0)  fL


 


RDW  23.2 H   (11.5-15.5)  %


 


Neutrophils # (Manual)  0.10 L*   (1.3-7.7)  k/uL


 


Lymphocytes # (Manual)  0.76 L   (1.0-4.8)  k/uL


 


Macrocytosis  Marked A   


 


Sodium   136 L  (137-145)  mmol/L


 


Calcium   8.0 L  (8.4-10.2)  mg/dL








                      Microbiology - Last 24 Hours (Table)











 20 16:55 Blood Culture - Preliminary





 Blood    No Growth after 24 hours














Assessment and Plan


Assessment: 


1- patient with febrile neutropenia in this patient who did have history of 

myelodysplastic syndrome and has been on chemo with a recent admission to the 

hospital with similar symptoms CT of abdominal pelvis has been suggestive of 

splenic infarct that may be contributing to some of his fever with increasing 

lymphadenopathy in the abdominal area now with evidence of multiple pulmonary 

nodules underlying infectious status is to be rule out, however we have 

discussed further with the oncology and medical team on aggressive they wanted 

patient to be treated as there was talk of hospice 2 days ago





(1) Neutropenic fever


Current Visit: Yes   Status: Acute   Code(s): D70.9 - NEUTROPENIA, UNSPECIFIED; 

R50.81 - FEVER PRESENTING WITH CONDITIONS CLASSIFIED ELSEWHERE   SNOMED Code(s):

113623131


   





(2) Pneumonia


Current Visit: Yes   Status: Acute   Code(s): J18.9 - PNEUMONIA, UNSPECIFIED 

ORGANISM   SNOMED Code(s): 776210294


   


Plan: 


1-blood cultures will be repeated


2- we will increase the dose of cefepime 2 g every 8 hours and add Flagyl 500 

every 8 hours


3- we will review the CT with the radiologist tomorrow


4-IV fluid


We will follow on clinical condition and cultures to further adjust medication 

if needed


Thank you for this consultation will follow this patient with you








Time with Patient: Greater than 30

## 2020-06-28 NOTE — P.CONS
History of Present Illness





- Reason for Consult


Consult date: 20


Febrile neutropenia with pneumonia.  Pancytopenia/MDS





- History of Present Illness





Mr. Newberry is a 71 yo WM  with multiple comorbidities including MDS with ringed 

sideroblasts, well known to myself and recently started on chemotherapy with 

dacogen, who is admitted  for recurrent neutropenic fever.  The patient had been

admitted with similar complaints on 20 with slow improvement with 

aggressive IV antibiotics.  At that time to was felt to have a pneumonia.  He 

was discharged home on oral antibiotics, but developed recurrent fevers with 

chills and sweats on the day of admission.  This was not controlled with anti-

pyretic and cooling measures leading him to come back to the hospital.  Work up 

suggestive of pneumonia.  He has severe neutropenia, pancytopenia due to 

chemotherapy and MDS.





Patient's cancer history is as follows:


   He was initially seen for progressive anemia, and persistently high MCV. Labs

from  revealed a Hgb of 13.3, with .4. CRP was mildly elevated at 

1.37.On 16, Hgb was 8.6, with .5. Ferritin was 1053, but saturation 

was normal at 27%. TIBC was low at 246. CRP was higher at 3.02. Total globulin 

was elevated at 4.1. Other aspects of his CBC and CMP were normal.


  He denied any prior h/o blood problems, or transfusions, or any ongoing 

chronic inflammatory condition.


  Additional labs were ordered, which were negative, other than a mild elevation

of light chains, with normal ratio. Repeat iron studies again showed no evidence

of hemochromatosis.


  He had a bone marrow on 16. Prelim report, per my discussion with 

Pathology indicated erythroid hyperplasia, with some megaloblastoid changes, and

increased ring sideroblasts. Flow indicated an aberrant population of blasts ( 

1%). This is most indicative of MDS.


  





20:


   the patient was referred back here as a new consult.  At the time of his 

initial evaluation in , on discussion of the pathology, he had indicated 

that he did not want any treatment especially chemotherapy.  As hemoglobin was 

still in a safe range, it was recommended that the patient be followed with 

observation at the time.  However he canceled his scheduled appointment in  

did not follow-up in the office subsequently.


  According to physician notes available the patient remained quite noncompliant

and erratic in follow-up with his PCP.  He had presented in 3/20 with complains 

of progressive fatigue, shortness of breath on exertion, and dizziness over the 

past several weeks.  He was found to have a hemoglobin of 6.7 with MCV 99, 

platelets 193 and WBC 4.5 with ANC 1.2 and ALC 2.1.GFR was essentially normal, 

along with other liver enzymes.  Uric acid was elevated at 8.2.  C-reactive 

protein was 9.5.  His iron saturation was 50% with ferritin 930.


  the patient was admitted to [facility] on 3/19/20 and 

received blood transfusion.  Hemoglobin improvement to the 8 range any was 

subsequently discharged.  He also had a flow cytometry done by his PCP around 

this time which was negative other than some neutropenia.


  the patient had a bone marrow aspiration biopsy repeated on 20.  This 

revealed similar changes with erythroid hyperplasia, and ring sideroblasts 

indicative of MDS.  There was no evidence of transformation to acute leukemia.





telemedicine 20:


  The patient had repeat bone marrow aspiration biopsy with results as noted 

above.  He denied any fevers/chills/nausea/vomiting.  He continues to complain 

of easy fatigability, and shortness of breath on exertion which is stable since 

his visit he denies any obvious bleeding.  No history of any lymph node 

enlargement, or new bone pain.  Appetite is normal.  Review of systems otherwise

as per HPI and negative out of 10. After discussion of pathology and options, he

opted for active treatment.











-20: C1 of Dacogen.





 At the time of his recent discharge, the patient and his family had decided 

against doing additional chemotherapy.  Palliative care/hospice was therefore 

assisted to them but the patient refused the same as he felt that he was not 

ready for that yet.





Review of Systems


Constitutional: Reports chills, Reports fatigue, Reports fever, Reports poor katherine

etite, Reports weakness


Eyes: denies blurred vision, denies pain


Ears: deny: decreased hearing, ear discharge, earache, tinnitus


Ears, nose, mouth and throat: Denies headache, Denies sore throat


Cardiovascular: Reports decreased exercise tolerance


Respiratory: Denies cough


Gastrointestinal: Reports diarrhea


Genitourinary: Reports as per HPI


Musculoskeletal: Reports muscle weakness


Integumentary: Denies pruritus, Denies rash


Neurological: Reports weakness


Psychiatric: Denies anxiety, Denies depression


Endocrine: Reports fatigue


Hematologic/Lymphatic: Reports as per HPI





Past Medical History


Past Medical History: Cancer, Skin Disorder


Additional Past Medical History / Comment(s): Gout, cellulitis to left leg? 

(patient states he is itchy, some scabs and scarring noted), chronic anemia, 

bone marrow cancer


History of Any Multi-Drug Resistant Organisms: None Reported


Past Surgical History: No Surgical Hx Reported


Additional Past Surgical History / Comment(s): surgery on rt leg and foot after 

injury age 3


Past Anesthesia/Blood Transfusion Reactions: No Reported Reaction


Past Psychological History: No Psychological Hx Reported


Smoking Status: Current every day smoker


Past Alcohol Use History: Daily


Additional Past Alcohol Use History / Comment(s): Patient states he smokes 1/2 

ppd.  Patient drinks 4-6 beers per day for many years and cut down to 1-2 beers 

per day for the past 2 months.  Patient lives at home with his wife.


Past Drug Use History: None Reported





- Past Family History


  ** Mother


Family Medical History: Cancer





  ** Brother(s)


Family Medical History: Cancer





  ** Father


Additional Family Medical History / Comment(s): Father  at age 93 from old 

age.





Medications and Allergies


                                Home Medications











 Medication  Instructions  Recorded  Confirmed  Type


 


Acyclovir 400 mg PO TID #90 tablet 20 Rx


 


Fluconazole [Diflucan] 100 mg PO DAILY #30 tab 20 Rx


 


Acetaminophen Tab [Tylenol] 650 mg PO Q6HR PRN  tab 20 Rx


 


Levofloxacin [Levaquin] 500 mg PO DAILY 3 Days #14 tab 20 Rx


 


Multivitamins, Thera [Multivitamin 1 tab PO DAILY 20 History





(formulary)]    








                                    Allergies











Allergy/AdvReac Type Severity Reaction Status Date / Time


 


No Known Allergies Allergy   Verified 20 19:12














Physical Exam


Vitals: 


                                   Vital Signs











  Temp Pulse Pulse Resp BP BP Pulse Ox


 


 20 22:40  101 F H      


 


 20 20:42  98.8 F   75  18   137/72  96


 


 20 19:27  99.2 F  72   18  110/63   97


 


 20 18:04  99.7 F H  70   18  107/64   95


 


 20 16:33  101.2 F H  81   16  126/69   98








                                Intake and Output











 20





 14:59 22:59 06:59


 


Other:   


 


  Weight  60.328 kg 














- Constitutional


General appearance: no acute distress





- EENT


Eyes: EOMI, PERRLA


ENT: hearing grossly normal, normal oropharynx





- Neck


Neck: no lymphadenopathy


Thyroid: bilateral: normal size





- Respiratory


Respiratory: bilateral: CTA





- Cardiovascular


Rhythm: regular


Heart sounds: normal: S1, S2





- Gastrointestinal


General gastrointestinal: normal bowel sounds, soft





- Integumentary


Integumentary: normal





- Neurologic


Neurologic: CNII-XII intact





- Musculoskeletal


Musculoskeletal: generalized weakness, strength equal bilaterally





- Psychiatric


Psychiatric: A&O x's 3, appropriate affect





Results


CBC & Chem 7: 


                                 20 07:24





                                 20 07:24


Labs: 


                  Abnormal Lab Results - Last 24 Hours (Table)











  20 Range/Units





  16:55 16:55 17:00 


 


WBC    0.6 L*  (3.8-10.6)  k/uL


 


RBC    2.32 L  (4.30-5.90)  m/uL


 


Hgb    7.0 L  (13.0-17.5)  gm/dL


 


Hct    22.6 L  (39.0-53.0)  %


 


MCHC    30.8 L  (31.0-37.0)  g/dL


 


RDW    23.1 H  (11.5-15.5)  %


 


PT  12.2 H    (9.0-12.0)  sec


 


INR  1.2 H    (<1.2)  


 


Sodium   132 L   (137-145)  mmol/L


 


Glucose   105 H   (74-99)  mg/dL


 


Calcium   8.1 L   (8.4-10.2)  mg/dL


 


AST   62 H   (17-59)  U/L


 


Albumin   3.2 L   (3.5-5.0)  g/dL


 


Urine Protein     (Negative)  


 


Urine Blood     (Negative)  


 


Urine Mucus     (None)  /hpf














  20 Range/Units





  17:00 


 


WBC   (3.8-10.6)  k/uL


 


RBC   (4.30-5.90)  m/uL


 


Hgb   (13.0-17.5)  gm/dL


 


Hct   (39.0-53.0)  %


 


MCHC   (31.0-37.0)  g/dL


 


RDW   (11.5-15.5)  %


 


PT   (9.0-12.0)  sec


 


INR   (<1.2)  


 


Sodium   (137-145)  mmol/L


 


Glucose   (74-99)  mg/dL


 


Calcium   (8.4-10.2)  mg/dL


 


AST   (17-59)  U/L


 


Albumin   (3.5-5.0)  g/dL


 


Urine Protein  3+ H  (Negative)  


 


Urine Blood  Moderate H  (Negative)  


 


Urine Mucus  Rare H  (None)  /hpf











Chest x-ray: report reviewed





Assessment and Plan


(1) Neutropenic fever


Narrative/Plan: 


The patient has had recurrence of the same, after recent discharge for similar 

complaints.  He remains neutropenic due to his underlying MDS.  Case discussed 

in detail with the emergency room physician.  The patient has been started back 

on broad-spectrum IV antibiotics.  Cultures have been ordered.  As his bone 

marrow did not show any excess blasts previously, it was felt that it would be 

reasonable to start him on white cell growth factors.


- Source is not definitely known though pneumonia is presumed given subtle 

finding on chest x-ray.  He has no other localizing signs at this time.


- Continue treatment as above


Current Visit: Yes   Status: Acute   Code(s): D70.9 - NEUTROPENIA, UNSPECIFIED; 

R50.81 - FEVER PRESENTING WITH CONDITIONS CLASSIFIED ELSEWHERE   SNOMED Code(s):

785746952


   





(2) Pneumonia


Narrative/Plan: 


Felt to be possible source, given somewhat subtle findings on x-ray.  Treatment 

plan as above


Current Visit: Yes   Status: Acute   Code(s): J18.9 - PNEUMONIA, UNSPECIFIED 

ORGANISM   SNOMED Code(s): 058487152


   





(3) Bicytopenia


Narrative/Plan: 


Due to underlying myelodysplastic syndrome.  Diagnostic and therapeutic 

circumstances as noted in the HPI.  At the time of his previous admission the 

patient had been reluctant to pursue active treatment.  Palliative care/hospice 

had been suggested but apparently he had refused the same as he felt he was not 

ready for that yet.  


- I discussed options available to him in detail.  He was advised that he has 

underlying significant cytopenias due to his disease.  If untreated the 

cytopenias are expected to progress, with recurrent complications such as seen 

during this admission.  It appears that he and his wife were considering 

supportive measures such as transfusions or growth factors.  He was advised that

the chances of any significant benefit for a reasonable duration would be 

extremely low with this approach, and this would continue to carry persistent 

risks of recurrent hospitalizations due to complications such as infections 

severe anemia etc.


- The other option would be to continue active treatment.  We have previously 

discussed that his treatment regimen has about a 50-60% chance of clinical 

benefit.  However it may take up to 3-4 cycles (12-16 weeks) before response 

occurs.  Even with the patient was going to respond, it is possible that would 

continue treatment, he could be at risk of complications such as noted during 

this admission before response occurs.  We had discussed this previously in 

detail, and this was reiterated.  Previously the patient had decided to go ahead

with active treatment.  Given the risk of similar complications with supportive 

care, and the patient's reluctance for hospice, at this time it would seem that 

continuing with active treatment may be the more reasonable option.


  At this time the patient appears somewhat undecided.  We will meet with them 

to meet with him and his wife tomorrow morning to reiterate the same 


- The patient is on growth factors to improve his WBC.  Continue to monitor 

counts and transfuse as needed to keep hemoglobin greater than 7 .


Current Visit: Yes   Status: Acute   Code(s): D75.89 - OTHER SPECIFIED DISEASES 

OF BLOOD AND BLOOD-FORMING ORGANS   SNOMED Code(s): 03476956

## 2020-06-28 NOTE — CT
EXAMINATION TYPE: CT chest abdomen w con

 

DATE OF EXAM: 6/28/2020

 

COMPARISON: CT abdomen 6/25/2020

 

HISTORY: LUNG MASS, SPLEEN ABNORMALITY

 

CT DLP: 544.9 mGycm

Automated exposure control for dose reduction was used.

 

CONTRAST: 

Performed with IV Contrast, patient injected with 100 mL of Isovue 300.

There is oral contrast.

Images were obtained from the thoracic inlet to the iliac crest with IV contrast.

 

There is 2 cm stellate mass in the right upper lobe. There is a 2.5 x 1.5 cm noncalcified mass adjace
nt to the pleura in the posterior segment right upper lobe. There is 1 cm noncalcified nodules some p
leural right lower lobe right paraspinal region. There is bilateral calcified pleural plaque. This is
 seen at the diaphragm and along the posterior chest wall. Also at the right cardiac border. There is
 no pericardial effusion. There are no hilar masses. There is no mediastinal adenopathy. 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 The
re is no dissection.

 

Liver pancreas gallbladder appear normal. Bile ducts are not dilated. There is 4 x 2 cm area of hypod
ensity in the posterior cortex of the spleen. There is no adrenal mass. Kidneys show satisfactory con
trast opacification. There is 1.5 cm right renal cortical cyst. There is some fat stranding around th
e left kidney. There is no hydronephrosis. There are a few enlarged retroperitoneal lymph nodes aroun
d the abdominal aorta. These measure up to 1.7 cm.

 

Abdominal aorta is atheromatous. I see no sign of a bowel obstruction. There is no ascites. There is 
no sign of free air.

 

The thoracic and lumbar vertebra show normal alignment. There is mild 20% anterior wedging of T3 vert
ebra that is probably old. There are multilevel spondylotic changes. I see no focal bone destruction.
 Sternum is intact. Visualized bony pelvis is intact. The ribs appear intact.

 

IMPRESSION:

Multiple pulmonary densities are nonspecific. Tumor is possible.. Bilateral moderate calcified pleura
l plaque.

 

There are enlarged abdominal retroperitoneal lymph nodes unchanged compared to recent exam.

 

Hypodensity in the posterior cortex of the spleen suggestive of infarct unchanged.

 

Left side perinephric fat stranding of uncertain significance unchanged.

## 2020-06-28 NOTE — CDI
Documentation Clarification Form

Date: 6/28/20

From: Elham Bertrand

Phone: If you have a question about this query, please contact Donna Ortega, 
 at [phone] between 8am and 5pm.

Admit Date: 6/19/20

Discharge Date: 6/25/20 

Patient Name: KVNG MCINTOSH

Visit Number: [id number]



ATTENTION: The Clinical Documentation Specialists (CDI) and [facility] Coding Staff 
appreciate your assistance in clarifying documentation. Please respond to the 
clarification below the line at the bottom and electronically sign. The CDI & 
[facility] Coding staff will review the response and follow-up if needed. Please note: 
Queries are made part of the Legal Health Record. If you have any questions, 
please contact the author of this message via ITS.



Dear Dr. Yogesh Mendoza,



Cachectic has been documented in ED Note. 



History/Risk Factors: MDS w sideroblasts, antineoplastic chemo induced 
pancytopenia, pneumonia, smoker, alcohol abuse.

Labs: Total protein: 7.5, 6.8, 7.3, 7.1; Albumin: 3.3, 2.8, 3.1, 2.9

   Current BMI: 17.8

   Insufficient energy intake:yes, poor appetite, refusing meals,

Treatment: Ensure TID, high calorie/protein foods



In your professional opinion, can you please clarify if these findings signify 
one of the following conditions? 



   Mild Protein-Calorie Malnutrition 

   xx Moderate Protein-Calorie Malnutrition

   Severe Protein-Calorie Malnutrition

   Malnutrition, unspecified

   Other condition, please specify ___________________

   Unable to determine

___________________________________________________________________________

MTDD

## 2020-06-29 LAB
ALBUMIN SERPL-MCNC: 2.6 G/DL (ref 3.5–5)
ALP SERPL-CCNC: 61 U/L (ref 38–126)
ALT SERPL-CCNC: 44 U/L (ref 4–49)
ANION GAP SERPL CALC-SCNC: 8 MMOL/L
AST SERPL-CCNC: 67 U/L (ref 17–59)
BUN SERPL-SCNC: 19 MG/DL (ref 9–20)
CALCIUM SPEC-MCNC: 7.4 MG/DL (ref 8.4–10.2)
CHLORIDE SERPL-SCNC: 106 MMOL/L (ref 98–107)
CO2 SERPL-SCNC: 25 MMOL/L (ref 22–30)
ERYTHROCYTE [DISTWIDTH] IN BLOOD BY AUTOMATED COUNT: 2.87 M/UL (ref 4.3–5.9)
ERYTHROCYTE [DISTWIDTH] IN BLOOD: 23 % (ref 11.5–15.5)
GLUCOSE SERPL-MCNC: 186 MG/DL (ref 74–99)
HCT VFR BLD AUTO: 29.5 % (ref 39–53)
HGB BLD-MCNC: 8.7 GM/DL (ref 13–17.5)
MCH RBC QN AUTO: 30.5 PG (ref 25–35)
MCHC RBC AUTO-ENTMCNC: 29.6 G/DL (ref 31–37)
MCV RBC AUTO: 103 FL (ref 80–100)
PLATELET # BLD AUTO: 290 K/UL (ref 150–450)
POTASSIUM SERPL-SCNC: 4.6 MMOL/L (ref 3.5–5.1)
PROT SERPL-MCNC: 7 G/DL (ref 6.3–8.2)
SODIUM SERPL-SCNC: 139 MMOL/L (ref 137–145)
WBC # BLD AUTO: 0.9 K/UL (ref 3.8–10.6)

## 2020-06-29 RX ADMIN — METHYLPREDNISOLONE SODIUM SUCCINATE SCH MG: 125 INJECTION, POWDER, FOR SOLUTION INTRAMUSCULAR; INTRAVENOUS at 05:13

## 2020-06-29 RX ADMIN — CEFAZOLIN SCH: 330 INJECTION, POWDER, FOR SOLUTION INTRAMUSCULAR; INTRAVENOUS at 04:41

## 2020-06-29 RX ADMIN — METHYLPREDNISOLONE SODIUM SUCCINATE SCH MG: 125 INJECTION, POWDER, FOR SOLUTION INTRAMUSCULAR; INTRAVENOUS at 16:57

## 2020-06-29 RX ADMIN — CEFEPIME HYDROCHLORIDE SCH MLS/HR: 2 INJECTION, POWDER, FOR SOLUTION INTRAVENOUS at 16:56

## 2020-06-29 RX ADMIN — PANTOPRAZOLE SODIUM SCH MG: 40 INJECTION, POWDER, FOR SOLUTION INTRAVENOUS at 07:54

## 2020-06-29 RX ADMIN — CEFEPIME HYDROCHLORIDE SCH MLS/HR: 2 INJECTION, POWDER, FOR SOLUTION INTRAVENOUS at 07:54

## 2020-06-29 RX ADMIN — ACYCLOVIR SCH MG: 200 CAPSULE ORAL at 20:45

## 2020-06-29 RX ADMIN — ACYCLOVIR SCH MG: 200 CAPSULE ORAL at 07:54

## 2020-06-29 RX ADMIN — METHYLPREDNISOLONE SODIUM SUCCINATE SCH MG: 125 INJECTION, POWDER, FOR SOLUTION INTRAMUSCULAR; INTRAVENOUS at 00:11

## 2020-06-29 RX ADMIN — SODIUM CHLORIDE SCH MLS/HR: 9 INJECTION, SOLUTION INTRAVENOUS at 08:30

## 2020-06-29 RX ADMIN — ACYCLOVIR SCH MG: 200 CAPSULE ORAL at 16:56

## 2020-06-29 RX ADMIN — METRONIDAZOLE SCH MLS/HR: 500 INJECTION, SOLUTION INTRAVENOUS at 07:19

## 2020-06-29 RX ADMIN — FLUCONAZOLE SCH MG: 100 TABLET ORAL at 07:54

## 2020-06-29 RX ADMIN — FILGRASTIM-SNDZ SCH MCG: 480 INJECTION, SOLUTION INTRAVENOUS; SUBCUTANEOUS at 07:54

## 2020-06-29 RX ADMIN — CEFAZOLIN SCH MLS/HR: 330 INJECTION, POWDER, FOR SOLUTION INTRAMUSCULAR; INTRAVENOUS at 16:57

## 2020-06-29 RX ADMIN — METRONIDAZOLE SCH MLS/HR: 500 INJECTION, SOLUTION INTRAVENOUS at 01:01

## 2020-06-29 RX ADMIN — CEFEPIME HYDROCHLORIDE SCH MLS/HR: 2 INJECTION, POWDER, FOR SOLUTION INTRAVENOUS at 00:11

## 2020-06-29 RX ADMIN — SODIUM CHLORIDE SCH MLS/HR: 9 INJECTION, SOLUTION INTRAVENOUS at 20:45

## 2020-06-29 RX ADMIN — METRONIDAZOLE SCH MLS/HR: 500 INJECTION, SOLUTION INTRAVENOUS at 16:57

## 2020-06-29 RX ADMIN — BUDESONIDE AND FORMOTEROL FUMARATE DIHYDRATE SCH PUFF: 80; 4.5 AEROSOL RESPIRATORY (INHALATION) at 08:01

## 2020-06-29 RX ADMIN — BUDESONIDE AND FORMOTEROL FUMARATE DIHYDRATE SCH PUFF: 80; 4.5 AEROSOL RESPIRATORY (INHALATION) at 21:06

## 2020-06-29 RX ADMIN — METHYLPREDNISOLONE SODIUM SUCCINATE SCH MG: 125 INJECTION, POWDER, FOR SOLUTION INTRAMUSCULAR; INTRAVENOUS at 13:04

## 2020-06-29 NOTE — P.PN
Subjective


This is a 70-year-old patient of Dr. Luu with a past medical history of 

anemia, gout, tobacco use and dependence, daily alcohol use.  And cancer.  

Patient is unsure of the type of cancer that he has.  He has been receiving 

chemotherapy and blood transfusions.  He is under care with Dr. Dos Santos.  Patient 

presented yesterday for evaluation of fever and generalized weakness.  Patient a

transfusion yesterday morning at approximately 9 AM.  He was noted to have a 

fever during the afternoon.  Patient states that his most recent chemotherapy 

was approximately 2 weeks ago.  He denies any pain or at this time.  Denies any 

vomiting or diarrhea.  Patient states that he has been short of breath recently 

with any activity.  Denies any cough.  Patient is a current smoker.  Patient was

discharged June 25, and comes in again to the emergency room with the same 

problem, fever, failure of a cycle of year, and Levaquin, for which the regimen 

was complied on by family.  Patient was hesitant on going to hospice, and is not

feeling ready to be in a hospice program yet.





 Patient was admitted from June 21 until June 25, seen by oncology ID, discharge

med at that time a cycle of year Levaquin, along with other maintenance meds, he

received to admission 1 unit of packed red blood cell, as well as colony 

simulating factor, at that time he received Vanco supper, there was some 

suspicion of either a wedge like abnormality in the spleen, during his last CAT 

scan.  Fever persists at home, and started 24 hours after discharge.  Patient is

coughing, has diarrhea, 4 times watery, no blood, patient denies any aspiration 

events, patient continues to smoke, patient started on cefepime, and vancomycin,

Flagyl was added started secondary diarrhea.  Cultures were obtained ID consult 

oncology consult, patient has COPD exacerbation as well, as well as an o

rthopedic fever, and a new right upper lobe infiltrate, malignancy cannot be 

ruled out, CT chest to be done, with IV contrast.  Urinalysis negative.





6/29: Patient was evaluated on morning rounds. Denies any complaints. He is 

currently afebrile 97.7, blood pressure is 91/50, heart rate 67. WBC 0.9, Hgb 

8.7, Hct 29.5, neutrophils 0.10, blood cultures show no growth to date. He 

continues on Acyclovir, Cefepine, Vancomycin, and Metronidazole. Infectious 

disease is on consult. He continues on Zarxio, discussion with patient if WBC 

does not improve by Wednesday, may need transfer to [facility] in [place]. Oncology 

note reviewed, discussion with patient and wife regarding Hospice and different 

treatment options. Will follow up with their decision. 





Objective





- Vital Signs


Vital signs: 


                                   Vital Signs











Temp  97.7 F   06/29/20 07:00


 


Pulse  67   06/29/20 07:00


 


Resp  16   06/29/20 07:00


 


BP  91/50   06/29/20 07:00


 


Pulse Ox  93 L  06/29/20 07:00








                                 Intake & Output











 06/28/20 06/29/20 06/29/20





 18:59 06:59 18:59


 


Intake Total 900  200


 


Balance 900  200


 


Weight 60.328 kg  


 


Intake:   


 


    


 


    Cefepime 1 gm In Sodium 50  





    Chloride 0.9% 50 ml @ 100   





    mls/hr IVPB Q12HR [facility] Rx   





    #:[id number]   


 


    Sodium Chloride 0.9% 1, 600  





    000 ml @ 75 mls/hr IV .   





    [id number] [facility] Rx#:[id number]   


 


    Vancomycin 1,000 mg In 250  





    Sodium Chloride 0.9% 250   





    ml @ 125 mls/hr IVPB Q12H   





    [facility] Rx#:[id number]   


 


  Oral   200


 


Other:   


 


  # Voids  1 














- Exam





- Constitutional


General appearance: cooperative, no acute distress, thin.





- EENT


Eyes: anicteric sclerae, EOMI, PERRLA, dentition normal, normal appearance


ENT: NA/AT, normal oropharynx





- Neck


Neck: normal ROM





- Respiratory


Respiratory: bilateral: wheezing, negative: CTA, diminished, dullness





- Cardiovascular


Rhythm: regular


Heart sounds: normal: S1


Abnormal Heart Sounds: no systolic murmur, no diastolic murmur, no rub, no S3 

Gallop, no S4 Gallop, no click, no other





- Gastrointestinal


General gastrointestinal: normal bowel sounds, soft





- Integumentary


Integumentary: decreased turgor, normal





- Neurologic


Neurologic: CNII-XII intact





- Musculoskeletal


Musculoskeletal: gait normal, strength equal bilaterally





- Psychiatric


Psychiatric: A&O x's 3, appropriate affect, intact judgment & insight








- Labs


CBC & Chem 7: 


                                 07/01/20 07:10





                                 07/01/20 07:10


Labs: 


                  Abnormal Lab Results - Last 24 Hours (Table)











  06/29/20 06/29/20 Range/Units





  07:21 07:21 


 


WBC   0.9 L*  (3.8-10.6)  k/uL


 


RBC   2.87 L  (4.30-5.90)  m/uL


 


Hgb   8.7 L  (13.0-17.5)  gm/dL


 


Hct   29.5 L  (39.0-53.0)  %


 


MCV   103.0 H  (80.0-100.0)  fL


 


MCHC   29.6 L  (31.0-37.0)  g/dL


 


RDW   23.0 H  (11.5-15.5)  %


 


Macrocytosis   Marked A  


 


Glucose  186 H   (74-99)  mg/dL


 


Calcium  7.4 L   (8.4-10.2)  mg/dL


 


AST  67 H   (17-59)  U/L


 


Albumin  2.6 L   (3.5-5.0)  g/dL








                      Microbiology - Last 24 Hours (Table)











 06/29/20 08:08 Sputum Culture - Preliminary





 Sputum 


 


 06/27/20 16:55 Blood Culture - Preliminary





 Blood    No Growth after 24 hours














Assessment and Plan


Plan: 





1.  Neutropenic fever with recurrent admission, sepsis with failure of Levaquin 

prior to admission.  Continue cefepime and vancomycin.  ID and oncology on 

consult. Blood culture repeated, has diarrhea, check for C. diff colitis, 

previous imaging studies 6/25 during last admission, shows 1.2 cm kidney cyst, 

not  abscess, wedge-shaped hypodensity in spleen, related to either infectious 

and inflammatory against ischemic etiology, neoplasm needs to be considered, 

trace left pelvic ascites moderate wall thickening gastroduodenal junction, 

check stools for H pylori as patient has persistent epigastric pain, cxr right 

infiltrate vs nodule IV Flagyl admitted for diarrhea, O&P obtained, as well as 

stool cultures





2.  Poorly marginated right upper lobe infiltrate, 1.7 cm, CT of the chest, to 

evaluate for pulmonary nodule against pneumonia, IV antibiotics





3.  Persistent epigastric pain check for H. pylori, possible abnormal thickening

in the gastroduodenal junction, not an abscess, on PPI offered





4.  COPD exacerbation currently smoker nebulized treatments, albuterol 

Pulmicort, low-dose steroids 40 mg every 6 hours short bursts, patient was 

advised to quit smoking still, and recent pro-calcitonin still elevated, IV 

antibiotics initiated, cultures sputum cultures if available





5 Chronic anemia.  See above.  Status post transfusion of 1 unit of packed RBCs,

stable.  Continue Zarxio





6  MDS with ringed sideroblasts see above





7  Tobacco use and dependence





8  Alcohol abuse, stable





9 Diarrhea, with ongoing antibiotic for sepsis, Flagyl added to regimen of 

cefepime VINCENT Baig. diff toxin consult with ID





10  GI prophylaxis.  Pantoprazole 40 mg IV daily





11. Severe Malnutrition, with protein calorie malnutrition, insulin light, 3 

times a day with meals





12.  DVT prophylaxis.  Ambulation





13.  COVID-19 infection not present





Discharge plan: Home without home care








The above impression and plan of care have been discussed and directed by 

signing physician. Caridad Rosales nurse practitioner acting as scribe for 

signing physician.

## 2020-06-29 NOTE — P.PN
Subjective


Progress Note Date: 06/29/20


Principal diagnosis: 





neutropenic fever, MDS





In f/u today pt is feeling ok, no recent fever, nausea, new cough, purulent 

sputum, abd pain, diarrhea or pain.  His wife is at bedside for family meeting





Objective





- Vital Signs


Vital signs: 


                                   Vital Signs











Temp  97.7 F   06/29/20 07:00


 


Pulse  67   06/29/20 07:00


 


Resp  16   06/29/20 07:00


 


BP  91/50   06/29/20 07:00


 


Pulse Ox  93 L  06/29/20 07:00








                                 Intake & Output











 06/28/20 06/29/20 06/29/20





 18:59 06:59 18:59


 


Intake Total 900  200


 


Balance 900  200


 


Weight 60.328 kg  


 


Intake:   


 


    


 


    Cefepime 1 gm In Sodium 50  





    Chloride 0.9% 50 ml @ 100   





    mls/hr IVPB Q12HR [facility] Rx   





    #:[id number]   


 


    Sodium Chloride 0.9% 1, 600  





    000 ml @ 75 mls/hr IV .   





    [id number] [facility] Rx#:[id number]   


 


    Vancomycin 1,000 mg In 250  





    Sodium Chloride 0.9% 250   





    ml @ 125 mls/hr IVPB Q12H   





    [facility] Rx#:[id number]   


 


  Oral   200


 


Other:   


 


  # Voids  1 














- Constitutional


General appearance: Present: cooperative, no acute distress, thin





- EENT


Eyes: Present: anicteric sclerae, EOMI


ENT: Present: hearing grossly normal





- Respiratory


Details: 





resp even and unlabored





- Cardiovascular


Details: 





skin warm and dry to touch





- Neurologic


Neurologic: Present: CNII-XII intact





- Musculoskeletal


Musculoskeletal: Present: strength equal bilaterally





- Psychiatric


Psychiatric: Present: A&O x's 3





- Labs


CBC & Chem 7: 


                                 06/29/20 07:21





                                 06/29/20 07:21


Labs: 


                  Abnormal Lab Results - Last 24 Hours (Table)











  06/28/20 06/29/20 06/29/20 Range/Units





  07:24 07:21 07:21 


 


WBC    0.9 L*  (3.8-10.6)  k/uL


 


RBC    2.87 L  (4.30-5.90)  m/uL


 


Hgb    8.7 L  (13.0-17.5)  gm/dL


 


Hct    29.5 L  (39.0-53.0)  %


 


MCV    103.0 H  (80.0-100.0)  fL


 


MCHC    29.6 L  (31.0-37.0)  g/dL


 


RDW    23.0 H  (11.5-15.5)  %


 


Neutrophils # (Manual)  0.10 L*    (1.3-7.7)  k/uL


 


Lymphocytes # (Manual)  0.76 L    (1.0-4.8)  k/uL


 


Macrocytosis    Marked A  


 


Glucose   186 H   (74-99)  mg/dL


 


Calcium   7.4 L   (8.4-10.2)  mg/dL


 


AST   67 H   (17-59)  U/L


 


Albumin   2.6 L   (3.5-5.0)  g/dL








                      Microbiology - Last 24 Hours (Table)











 06/27/20 16:55 Blood Culture - Preliminary





 Blood    No Growth after 24 hours














Assessment and Plan


(1) MDS (myelodysplastic syndrome), high grade


Narrative/Plan: 


Pt recently diagnosed.  He has had 1 cycle of treatment with anticipated drop in

counts.  





Dr. Dos Santos reviewed with pt and wife diagnosis, treatment options-including 

palliative care or hospice.  He reviewed the anticipated clinical course, both 

with and without MDS treatment- need for transfusions, possible hospitalizations

and increased risk for infection. It was not recommended that the pt opt for 

transfusions/GCSF adm as the sole course of action for the disease.  This method

of treating is typically short lived, especially when there is frequent 

dependence on transfusions (antibody formation, iron overload).  Pt has not 

responded to GCSF previously.  Treat the underlying disease with 

transfusion/GCSF support is the most appropriate course of action as it has the 

best possibility for disease control, improving QOL and reducing the need for 

transfusion.


All of wife's questions were answered to her satisfaction.  We will f/u to see 

what they have decided. If going to pursue further treatment, will reschedule 

chemo


Cont supportive care and current treatments 


 


Current Visit: Yes   Status: Acute   Priority: High   Code(s): D46.Z - OTHER 

MYELODYSPLASTIC SYNDROMES   SNOMED Code(s): 730029407


   





(2) Bicytopenia


Narrative/Plan: 


Cont GCSF for now


Transfuse irradiated blood products.


Transfuse for Hgb<7 and plt count <10K unless symptomatic


Current Visit: Yes   Status: Chronic   Priority: High   Code(s): D75.89 - OTHER 

SPECIFIED DISEASES OF BLOOD AND BLOOD-FORMING ORGANS   SNOMED Code(s): 28945508


   





(3) Neutropenic fever


Narrative/Plan: 


Pancultures pending, ID consulted, empiric antibiotics


Current Visit: Yes   Status: Acute   Priority: High   Code(s): D70.9 - 

NEUTROPENIA, UNSPECIFIED; R50.81 - FEVER PRESENTING WITH CONDITIONS CLASSIFIED 

ELSEWHERE   SNOMED Code(s): 155941273


   


Plan: 





 Attests:  I have seen and examined patient, performed H&P, developed 

impression and plan of care.  Discussed with dictator, agree with documentation.

 Documented as a scribe.





Time with Patient: Greater than 30 (counseling and coordinating care)

## 2020-06-29 NOTE — PN
PROGRESS NOTE



DATE OF SERVICE:

06/29/2020



REASON FOR FOLLOWUP:

Febrile neutropenia, possible abdominal source.



INTERVAL HISTORY:

The patient's overall fever pattern has improved.  No fever has been recorded since

last evening.  The patient is feeling better and denies having any chest pain or

shortness of breath.  Occasional cough.  No nausea, no vomiting.  No abdominal pain or

diarrhea.



PHYSICAL EXAMINATION:

Blood pressure 97/55 with a pulse of 79, temperature 98.8.  He is 95% on room air.

General description is an elderly male lying in bed in no distress.

RESPIRATORY SYSTEM: Unlabored breathing. Clear to auscultation anteriorly.

HEART: S1, S2.  Regular rate and rhythm.

ABDOMEN: Soft. No tenderness.



LABS:

Hemoglobin 8.7, white count 0.9, and creatinine 0.73.  _____ and sputum cultures are

currently pending.



DIAGNOSTIC IMPRESSION AND PLAN:

Patient with febrile neutropenia with concern for possible abdominal source, though

there was some bilateral pulmonary infiltrate.  Sputum cultures have been ordered and

those will be followed. Continue with cefepime, Flagyl and vancomycin, adjusting

antibiotics further on the basis of culture report.  Continue with supportive care.





MMODL / IJN: [id number] / Job#: [id number]

## 2020-06-30 LAB
ALBUMIN SERPL-MCNC: 2.9 G/DL (ref 3.5–5)
ALP SERPL-CCNC: 51 U/L (ref 38–126)
ALT SERPL-CCNC: 62 U/L (ref 4–49)
ANION GAP SERPL CALC-SCNC: 8 MMOL/L
AST SERPL-CCNC: 79 U/L (ref 17–59)
BASOPHILS # BLD AUTO: 0 K/UL (ref 0–0.2)
BASOPHILS NFR BLD AUTO: 1 %
BUN SERPL-SCNC: 24 MG/DL (ref 9–20)
CALCIUM SPEC-MCNC: 7.6 MG/DL (ref 8.4–10.2)
CHLORIDE SERPL-SCNC: 108 MMOL/L (ref 98–107)
CO2 SERPL-SCNC: 22 MMOL/L (ref 22–30)
EOSINOPHIL # BLD AUTO: 0 K/UL (ref 0–0.7)
EOSINOPHIL NFR BLD AUTO: 1 %
ERYTHROCYTE [DISTWIDTH] IN BLOOD BY AUTOMATED COUNT: 2.33 M/UL (ref 4.3–5.9)
ERYTHROCYTE [DISTWIDTH] IN BLOOD: 23.1 % (ref 11.5–15.5)
GLUCOSE SERPL-MCNC: 181 MG/DL (ref 74–99)
HCT VFR BLD AUTO: 23.8 % (ref 39–53)
HGB BLD-MCNC: 7.4 GM/DL (ref 13–17.5)
LYMPHOCYTES # SPEC AUTO: 0.6 K/UL (ref 1–4.8)
LYMPHOCYTES NFR SPEC AUTO: 51 %
MCH RBC QN AUTO: 31.8 PG (ref 25–35)
MCHC RBC AUTO-ENTMCNC: 31.1 G/DL (ref 31–37)
MCV RBC AUTO: 102.3 FL (ref 80–100)
MONOCYTES # BLD AUTO: 0 K/UL (ref 0–1)
MONOCYTES NFR BLD AUTO: 2 %
NEUTROPHILS # BLD AUTO: 0.4 K/UL (ref 1.3–7.7)
NEUTROPHILS NFR BLD AUTO: 38 %
PLATELET # BLD AUTO: 313 K/UL (ref 150–450)
POTASSIUM SERPL-SCNC: 4.5 MMOL/L (ref 3.5–5.1)
PROT SERPL-MCNC: 7.5 G/DL (ref 6.3–8.2)
SODIUM SERPL-SCNC: 138 MMOL/L (ref 137–145)
WBC # BLD AUTO: 1.1 K/UL (ref 3.8–10.6)

## 2020-06-30 RX ADMIN — CEFEPIME HYDROCHLORIDE SCH MLS/HR: 2 INJECTION, POWDER, FOR SOLUTION INTRAVENOUS at 07:46

## 2020-06-30 RX ADMIN — CEFEPIME HYDROCHLORIDE SCH MLS/HR: 2 INJECTION, POWDER, FOR SOLUTION INTRAVENOUS at 23:31

## 2020-06-30 RX ADMIN — PANTOPRAZOLE SODIUM SCH MG: 40 TABLET, DELAYED RELEASE ORAL at 07:44

## 2020-06-30 RX ADMIN — ACYCLOVIR SCH MG: 200 CAPSULE ORAL at 22:24

## 2020-06-30 RX ADMIN — METRONIDAZOLE SCH MLS/HR: 500 INJECTION, SOLUTION INTRAVENOUS at 01:23

## 2020-06-30 RX ADMIN — SODIUM CHLORIDE SCH MLS/HR: 9 INJECTION, SOLUTION INTRAVENOUS at 06:20

## 2020-06-30 RX ADMIN — CEFEPIME HYDROCHLORIDE SCH MLS/HR: 2 INJECTION, POWDER, FOR SOLUTION INTRAVENOUS at 15:35

## 2020-06-30 RX ADMIN — FILGRASTIM-SNDZ SCH MCG: 480 INJECTION, SOLUTION INTRAVENOUS; SUBCUTANEOUS at 07:45

## 2020-06-30 RX ADMIN — SODIUM CHLORIDE SCH MLS/HR: 9 INJECTION, SOLUTION INTRAVENOUS at 19:01

## 2020-06-30 RX ADMIN — METHYLPREDNISOLONE SODIUM SUCCINATE SCH MG: 125 INJECTION, POWDER, FOR SOLUTION INTRAMUSCULAR; INTRAVENOUS at 00:44

## 2020-06-30 RX ADMIN — METRONIDAZOLE SCH MLS/HR: 500 INJECTION, SOLUTION INTRAVENOUS at 07:46

## 2020-06-30 RX ADMIN — CEFAZOLIN SCH MLS/HR: 330 INJECTION, POWDER, FOR SOLUTION INTRAMUSCULAR; INTRAVENOUS at 05:11

## 2020-06-30 RX ADMIN — ACYCLOVIR SCH MG: 200 CAPSULE ORAL at 16:36

## 2020-06-30 RX ADMIN — CEFEPIME HYDROCHLORIDE SCH MLS/HR: 2 INJECTION, POWDER, FOR SOLUTION INTRAVENOUS at 00:44

## 2020-06-30 RX ADMIN — FLUCONAZOLE SCH MG: 100 TABLET ORAL at 07:44

## 2020-06-30 RX ADMIN — METHYLPREDNISOLONE SODIUM SUCCINATE SCH MG: 125 INJECTION, POWDER, FOR SOLUTION INTRAMUSCULAR; INTRAVENOUS at 06:20

## 2020-06-30 RX ADMIN — METHYLPREDNISOLONE SODIUM SUCCINATE SCH MG: 125 INJECTION, POWDER, FOR SOLUTION INTRAMUSCULAR; INTRAVENOUS at 17:01

## 2020-06-30 RX ADMIN — METHYLPREDNISOLONE SODIUM SUCCINATE SCH MG: 125 INJECTION, POWDER, FOR SOLUTION INTRAMUSCULAR; INTRAVENOUS at 12:04

## 2020-06-30 RX ADMIN — ACYCLOVIR SCH MG: 200 CAPSULE ORAL at 07:44

## 2020-06-30 RX ADMIN — BUDESONIDE AND FORMOTEROL FUMARATE DIHYDRATE SCH PUFF: 80; 4.5 AEROSOL RESPIRATORY (INHALATION) at 09:47

## 2020-06-30 RX ADMIN — CEFAZOLIN SCH MLS/HR: 330 INJECTION, POWDER, FOR SOLUTION INTRAMUSCULAR; INTRAVENOUS at 19:01

## 2020-06-30 RX ADMIN — BUDESONIDE AND FORMOTEROL FUMARATE DIHYDRATE SCH PUFF: 80; 4.5 AEROSOL RESPIRATORY (INHALATION) at 20:09

## 2020-06-30 RX ADMIN — METRONIDAZOLE SCH MLS/HR: 500 INJECTION, SOLUTION INTRAVENOUS at 16:35

## 2020-06-30 NOTE — P.PN
Subjective


This is a 70-year-old patient of Dr. Luu with a past medical history of 

anemia, gout, tobacco use and dependence, daily alcohol use.  And cancer.  

Patient is unsure of the type of cancer that he has.  He has been receiving 

chemotherapy and blood transfusions.  He is under care with Dr. Dos Santos.  Patient 

presented yesterday for evaluation of fever and generalized weakness.  Patient a

transfusion yesterday morning at approximately 9 AM.  He was noted to have a 

fever during the afternoon.  Patient states that his most recent chemotherapy 

was approximately 2 weeks ago.  He denies any pain or at this time.  Denies any 

vomiting or diarrhea.  Patient states that he has been short of breath recently 

with any activity.  Denies any cough.  Patient is a current smoker.  Patient was

discharged June 25, and comes in again to the emergency room with the same 

problem, fever, failure of a cycle of year, and Levaquin, for which the regimen 

was complied on by family.  Patient was hesitant on going to hospice, and is not

feeling ready to be in a hospice program yet.





 Patient was admitted from June 21 until June 25, seen by oncology ID, discharge

med at that time a cycle of year Levaquin, along with other maintenance meds, he

received to admission 1 unit of packed red blood cell, as well as colony 

simulating factor, at that time he received Vanco supper, there was some 

suspicion of either a wedge like abnormality in the spleen, during his last CAT 

scan.  Fever persists at home, and started 24 hours after discharge.  Patient is

coughing, has diarrhea, 4 times watery, no blood, patient denies any aspiration 

events, patient continues to smoke, patient started on cefepime, and vancomycin,

Flagyl was added started secondary diarrhea.  Cultures were obtained ID consult 

oncology consult, patient has COPD exacerbation as well, as well as an o

rthopedic fever, and a new right upper lobe infiltrate, malignancy cannot be 

ruled out, CT chest to be done, with IV contrast.  Urinalysis negative.





6/29: Patient was evaluated on morning rounds. Denies any complaints. He is 

currently afebrile 97.7, blood pressure is 91/50, heart rate 67. WBC 0.9, Hgb 

8.7, Hct 29.5, neutrophils 0.10, blood cultures show no growth to date. He 

continues on Acyclovir, Cefepine, Vancomycin, and Metronidazole. Infectious 

disease is on consult. He continues on Zarxio, discussion with patient if WBC 

does not improve by Wednesday, may need transfer to [facility] in [place]. Oncology 

note reviewed, discussion with patient and wife regarding Hospice and different 

treatment options. Will follow up with their decision. 





6/30: Patient was examined this morning, noted to be sitting up at the bedside. 

Denies any new complaints.  Repeat white count this morning was 1.1, neutrophils

0.4, hemoglobin 7.4, hematocrit 23.8.  Patient remains on the cefepime, Flagyl, 

and vancomycin.  Awaiting further culture report, blood cultures show no growth 

to date, sputum cultures pending, infectious disease is on consult.  Vital signs

remain stable blood pressure 102/59, heart rate 65, temperature 97.7, 98% on 

room air.  Patient remains neutropenic despite treatment.  If labs do not 

improve by tomorrow may need transfer to [facility] in [place] for further treatment.





Objective





- Vital Signs


Vital signs: 


                                   Vital Signs











Temp  97.7 F   06/30/20 07:00


 


Pulse  65   06/30/20 07:00


 


Resp  20   06/30/20 07:00


 


BP  102/59   06/30/20 07:00


 


Pulse Ox  98   06/30/20 07:00








                                 Intake & Output











 06/29/20 06/30/20 06/30/20





 18:59 06:59 18:59


 


Intake Total 400  200


 


Balance 400  200


 


Intake:   


 


  Oral 400  200


 


Other:   


 


  # Voids 2 1 














- Exam





- Constitutional


General appearance: cooperative, no acute distress, thin.





- EENT


Eyes: anicteric sclerae, EOMI, PERRLA, dentition normal, normal appearance


ENT: NA/AT, normal oropharynx





- Neck


Neck: normal ROM





- Respiratory


Respiratory: bilateral: wheezing, negative: CTA, diminished, dullness





- Cardiovascular


Rhythm: regular


Heart sounds: normal: S1


Abnormal Heart Sounds: no systolic murmur, no diastolic murmur, no rub, no S3 

Gallop, no S4 Gallop, no click, no other





- Gastrointestinal


General gastrointestinal: normal bowel sounds, soft





- Integumentary


Integumentary: decreased turgor, normal





- Neurologic


Neurologic: CNII-XII intact





- Musculoskeletal


Musculoskeletal: gait normal, strength equal bilaterally





- Psychiatric


Psychiatric: A&O x's 3, appropriate affect, intact judgment & insight








- Labs


CBC & Chem 7: 


                                 07/01/20 07:10





                                 07/01/20 07:10


Labs: 


                  Abnormal Lab Results - Last 24 Hours (Table)











  06/30/20 06/30/20 Range/Units





  11:21 11:21 


 


WBC   1.1 L*  (3.8-10.6)  k/uL


 


RBC   2.33 L  (4.30-5.90)  m/uL


 


Hgb   7.4 L  (13.0-17.5)  gm/dL


 


Hct   23.8 L  (39.0-53.0)  %


 


MCV   102.3 H  (80.0-100.0)  fL


 


RDW   23.1 H  (11.5-15.5)  %


 


Neutrophils #   0.4 L*  (1.3-7.7)  k/uL


 


Lymphocytes #   0.6 L  (1.0-4.8)  k/uL


 


Macrocytosis   Marked A  


 


Chloride  108 H   ()  mmol/L


 


BUN  24 H   (9-20)  mg/dL


 


Creatinine  0.65 L   (0.66-1.25)  mg/dL


 


Glucose  181 H   (74-99)  mg/dL


 


Calcium  7.6 L   (8.4-10.2)  mg/dL


 


AST  79 H   (17-59)  U/L


 


ALT  62 H   (4-49)  U/L


 


Albumin  2.9 L   (3.5-5.0)  g/dL








                      Microbiology - Last 24 Hours (Table)











 06/27/20 16:55 Blood Culture - Preliminary





 Blood    No Growth after 48 hours


 


 06/29/20 08:08 Gram Stain - Preliminary





 Sputum Sputum Culture - Preliminary














Assessment and Plan


Plan: 





1.  Neutropenic fever with recurrent admission, sepsis with failure of Levaquin 

prior to admission.  Continue cefepime and vancomycin.  ID and oncology on 

consult. Blood culture repeated, has diarrhea, check for C. diff colitis, 

previous imaging studies 6/25 during last admission, shows 1.2 cm kidney cyst, 

not  abscess, wedge-shaped hypodensity in spleen, related to either infectious 

and inflammatory against ischemic etiology, neoplasm needs to be considered, 

trace left pelvic ascites moderate wall thickening gastroduodenal junction, 

check stools for H pylori as patient has persistent epigastric pain, cxr right 

infiltrate vs nodule IV Flagyl admitted for diarrhea, O&P obtained, as well as 

stool cultures, still cultures have not been obtained yet 





2.  Poorly marginated right upper lobe infiltrate, 1.7 cm, CT of the chest, to 

evaluate for pulmonary nodule against pneumonia, IV antibiotics





3.  Persistent epigastric pain check for H. pylori, possible abnormal thickening

in the gastroduodenal junction, not an abscess, on PPI offered





4.  COPD exacerbation currently smoker nebulized treatments, albuterol 

Pulmicort, low-dose steroids 40 mg every 6 hours short bursts, patient was 

advised to quit smoking still, and recent pro-calcitonin still elevated, IV 

antibiotics initiated, cultures sputum cultures if available





5 Chronic anemia.  See above.  Status post transfusion of 1 unit of packed RBCs,

stable.  Continue Zarxio





6  MDS with ringed sideroblasts see above





7  Tobacco use and dependence





8  Alcohol abuse, stable





9 Diarrhea, with ongoing antibiotic for sepsis, Flagyl added to regimen of 

cefepime Jovanni C. diff toxin consult with ID





10  GI prophylaxis.  Pantoprazole 40 mg IV daily





11. Severe Malnutrition, with protein calorie malnutrition, insulin light, 3 

times a day with meals





12.  DVT prophylaxis.  Ambulation





13.  COVID-19 infection not present





Discharge plan: Home without home care








The above impression and plan of care have been discussed and directed by 

signing physician. Caridad Rosales nurse practitioner acting as scribe for 

signing physician.

## 2020-06-30 NOTE — PN
PROGRESS NOTE



DATE OF SERVICE:

06/30/2020



REASON FOR FOLLOWUP:

Febrile neutropenia with possible abdominal source.



INTERVAL HISTORY:

The patient is currently afebrile.  The patient is breathing comfortably.  The patient

denies having any chest pain or shortness of breath.  Occasional cough.  No abdominal

pain.  No nausea, vomiting or diarrhea.



PHYSICAL EXAMINATION:

Blood pressure 102/59 with a pulse of 65, temperature 97.7. He is 98% on room air.

General description is an elderly male up in the bed in no distress.

RESPIRATORY SYSTEM: Unlabored breathing with decreased breath sounds at the base. No

wheeze.

HEART: S1, S2.  Regular rate and rhythm.

ABDOMEN: Soft. No tenderness.



LABS:

Hemoglobin 7.4, white count 1.1. Creatinine 0.65. Blood culture negative.  Sputum is

currently pending.



DIAGNOSTIC IMPRESSION AND PLAN:

Patient with febrile neutropenia; concern for possible abdominal source.  The patient

is currently on cefepime and Flagyl; to continue, with overall resolution of his fever.

Will wait for the blood and sputum cultures to finalize and monitor his clinical course

closely.  Continue with supportive care.





MMODL / IJN: [id number] / Job#: [id number]

## 2020-06-30 NOTE — P.PN
Subjective


Progress Note Date: 06/30/20


Principal diagnosis: 





neutropenic fever, MDS





In f/u today pt is feeling ok, no recent fever, nausea, new cough, purulent 

sputum, abd pain, diarrhea. 





Objective





- Vital Signs


Vital signs: 


                                   Vital Signs











Temp  98.1 F   06/30/20 15:00


 


Pulse  78   06/30/20 15:00


 


Resp  16   06/30/20 15:00


 


BP  115/64   06/30/20 15:00


 


Pulse Ox  98   06/30/20 15:00








                                 Intake & Output











 06/29/20 06/30/20 06/30/20





 18:59 06:59 18:59


 


Intake Total 400  400


 


Balance 400  400


 


Intake:   


 


  Oral 400  400


 


Other:   


 


  # Voids 2 1 2














- Constitutional


General appearance: Present: cooperative, no acute distress, thin





- EENT


Eyes: Present: anicteric sclerae, EOMI


ENT: Present: hearing grossly normal





- Respiratory


Respiratory: bilateral: CTA, diminished





- Cardiovascular


Heart sounds: normal: S1, S2





- Peripheral edema


  ** leg


Peripheral Edema: bilateral: None





- Gastrointestinal


General gastrointestinal: Present: normal bowel sounds, soft





- Musculoskeletal


Musculoskeletal: Present: strength equal bilaterally





- Psychiatric


Psychiatric: Present: A&O x's 3, appropriate affect, intact judgment & insight





- Labs


CBC & Chem 7: 


                                 06/30/20 11:21





                                 06/30/20 11:21


Labs: 


                  Abnormal Lab Results - Last 24 Hours (Table)











  06/30/20 06/30/20 Range/Units





  11:21 11:21 


 


WBC   1.1 L*  (3.8-10.6)  k/uL


 


RBC   2.33 L  (4.30-5.90)  m/uL


 


Hgb   7.4 L  (13.0-17.5)  gm/dL


 


Hct   23.8 L  (39.0-53.0)  %


 


MCV   102.3 H  (80.0-100.0)  fL


 


RDW   23.1 H  (11.5-15.5)  %


 


Neutrophils #   0.4 L*  (1.3-7.7)  k/uL


 


Lymphocytes #   0.6 L  (1.0-4.8)  k/uL


 


Macrocytosis   Marked A  


 


Chloride  108 H   ()  mmol/L


 


BUN  24 H   (9-20)  mg/dL


 


Creatinine  0.65 L   (0.66-1.25)  mg/dL


 


Glucose  181 H   (74-99)  mg/dL


 


Calcium  7.6 L   (8.4-10.2)  mg/dL


 


AST  79 H   (17-59)  U/L


 


ALT  62 H   (4-49)  U/L


 


Albumin  2.9 L   (3.5-5.0)  g/dL








                      Microbiology - Last 24 Hours (Table)











 06/27/20 16:55 Blood Culture - Preliminary





 Blood    No Growth after 48 hours


 


 06/29/20 08:08 Gram Stain - Preliminary





 Sputum Sputum Culture - Preliminary














Assessment and Plan


(1) MDS (myelodysplastic syndrome), high grade


Narrative/Plan: 


Pt recently diagnosed.  He has had 1 cycle of treatment with anticipated drop in

counts.  





6/29/20-Dr. Dos Santos reviewed with pt and wife diagnosis, treatment options-including

palliative care or hospice.  He reviewed the anticipated clinical course, both 

with and without MDS treatment- need for transfusions, possible hospitalizations

and increased risk for infection. It was not recommended that the pt opt for 

transfusions/GCSF adm as the sole course of action for the disease.  This method

of treating is typically short lived, especially when there is frequent 

dependence on transfusions (antibody formation, iron overload).  Pt has not 

responded to GCSF previously.  Treat the underlying disease with 

transfusion/GCSF support is the most appropriate course of action as it has the 

best possibility for disease control, improving QOL and reducing the need for 

transfusion.


All of wife's questions were answered to her satisfaction.  





In f/u today-pt remembers the conversation but not the decision he and his wife 

came to.  I told him if he wants to cont treatment to call the office several da

ys before so that the drug can be ordered and the RNs will sched him an appt.  

He verbalized understanding.  I will call his wife today or tomorrow to let her 

know.  


Current Visit: Yes   Status: Acute   Priority: High   Code(s): D46.Z - OTHER 

MYELODYSPLASTIC SYNDROMES   SNOMED Code(s): 751265804


   





(2) Bicytopenia


Narrative/Plan: 


Cont GCSF for now. 


Transfuse irradiated blood products.


Transfuse for Hgb<7 and plt count <10K unless symptomatic, no transfusions 

needed today


Current Visit: Yes   Status: Chronic   Priority: High   Code(s): D75.89 - OTHER 

SPECIFIED DISEASES OF BLOOD AND BLOOD-FORMING ORGANS   SNOMED Code(s): 43667713


   





(3) Neutropenic fever


Narrative/Plan: 


Pancultures negative, ID consulted and has seen pt, antibiotics given.  Fever 

pattern abated.





Pt is going to be persistently neutropenic 2/2 MDS.  


Current Visit: Yes   Status: Acute   Priority: High   Code(s): D70.9 - 

NEUTROPENIA, UNSPECIFIED; R50.81 - FEVER PRESENTING WITH CONDITIONS CLASSIFIED 

ELSEWHERE   SNOMED Code(s): 832200054

## 2020-07-01 LAB
CELLS COUNTED: 100
ERYTHROCYTE [DISTWIDTH] IN BLOOD BY AUTOMATED COUNT: 2.29 M/UL (ref 4.3–5.9)
ERYTHROCYTE [DISTWIDTH] IN BLOOD: 23.1 % (ref 11.5–15.5)
HCT VFR BLD AUTO: 23.5 % (ref 39–53)
HGB BLD-MCNC: 7.3 GM/DL (ref 13–17.5)
LYMPHOCYTES # BLD MANUAL: 0.43 K/UL (ref 1–4.8)
MCH RBC QN AUTO: 31.8 PG (ref 25–35)
MCHC RBC AUTO-ENTMCNC: 31 G/DL (ref 31–37)
MCV RBC AUTO: 102.9 FL (ref 80–100)
MONOCYTES # BLD MANUAL: 0.03 K/UL (ref 0–1)
MYELOCYTES # BLD MANUAL: 0.02 K/UL
NEUTROPHILS NFR BLD MANUAL: 67 %
NEUTS SEG # BLD MANUAL: 1.1 K/UL (ref 1.3–7.7)
PLATELET # BLD AUTO: 302 K/UL (ref 150–450)
WBC # BLD AUTO: 1.6 K/UL (ref 3.8–10.6)

## 2020-07-01 RX ADMIN — METHYLPREDNISOLONE SODIUM SUCCINATE SCH MG: 125 INJECTION, POWDER, FOR SOLUTION INTRAMUSCULAR; INTRAVENOUS at 16:53

## 2020-07-01 RX ADMIN — CEFEPIME HYDROCHLORIDE SCH MLS/HR: 2 INJECTION, POWDER, FOR SOLUTION INTRAVENOUS at 23:01

## 2020-07-01 RX ADMIN — METHYLPREDNISOLONE SODIUM SUCCINATE SCH MG: 125 INJECTION, POWDER, FOR SOLUTION INTRAMUSCULAR; INTRAVENOUS at 12:15

## 2020-07-01 RX ADMIN — CEFAZOLIN SCH MLS/HR: 330 INJECTION, POWDER, FOR SOLUTION INTRAMUSCULAR; INTRAVENOUS at 09:10

## 2020-07-01 RX ADMIN — BUDESONIDE AND FORMOTEROL FUMARATE DIHYDRATE SCH PUFF: 80; 4.5 AEROSOL RESPIRATORY (INHALATION) at 08:30

## 2020-07-01 RX ADMIN — ACYCLOVIR SCH MG: 200 CAPSULE ORAL at 23:01

## 2020-07-01 RX ADMIN — ACYCLOVIR SCH MG: 200 CAPSULE ORAL at 08:18

## 2020-07-01 RX ADMIN — PANTOPRAZOLE SODIUM SCH MG: 40 TABLET, DELAYED RELEASE ORAL at 08:19

## 2020-07-01 RX ADMIN — CEFEPIME HYDROCHLORIDE SCH MLS/HR: 2 INJECTION, POWDER, FOR SOLUTION INTRAVENOUS at 15:58

## 2020-07-01 RX ADMIN — METRONIDAZOLE SCH MLS/HR: 500 INJECTION, SOLUTION INTRAVENOUS at 16:53

## 2020-07-01 RX ADMIN — METHYLPREDNISOLONE SODIUM SUCCINATE SCH MG: 125 INJECTION, POWDER, FOR SOLUTION INTRAMUSCULAR; INTRAVENOUS at 05:51

## 2020-07-01 RX ADMIN — METHYLPREDNISOLONE SODIUM SUCCINATE SCH MG: 125 INJECTION, POWDER, FOR SOLUTION INTRAMUSCULAR; INTRAVENOUS at 23:42

## 2020-07-01 RX ADMIN — METHYLPREDNISOLONE SODIUM SUCCINATE SCH MG: 125 INJECTION, POWDER, FOR SOLUTION INTRAMUSCULAR; INTRAVENOUS at 00:08

## 2020-07-01 RX ADMIN — FILGRASTIM-SNDZ SCH MCG: 480 INJECTION, SOLUTION INTRAVENOUS; SUBCUTANEOUS at 08:25

## 2020-07-01 RX ADMIN — METRONIDAZOLE SCH MLS/HR: 500 INJECTION, SOLUTION INTRAVENOUS at 23:42

## 2020-07-01 RX ADMIN — CEFAZOLIN SCH MLS/HR: 330 INJECTION, POWDER, FOR SOLUTION INTRAMUSCULAR; INTRAVENOUS at 23:01

## 2020-07-01 RX ADMIN — CEFEPIME HYDROCHLORIDE SCH MLS/HR: 2 INJECTION, POWDER, FOR SOLUTION INTRAVENOUS at 07:11

## 2020-07-01 RX ADMIN — FLUCONAZOLE SCH MG: 100 TABLET ORAL at 08:19

## 2020-07-01 RX ADMIN — METRONIDAZOLE SCH MLS/HR: 500 INJECTION, SOLUTION INTRAVENOUS at 00:07

## 2020-07-01 RX ADMIN — BUDESONIDE AND FORMOTEROL FUMARATE DIHYDRATE SCH PUFF: 80; 4.5 AEROSOL RESPIRATORY (INHALATION) at 19:28

## 2020-07-01 RX ADMIN — ACYCLOVIR SCH MG: 200 CAPSULE ORAL at 15:58

## 2020-07-01 RX ADMIN — METRONIDAZOLE SCH MLS/HR: 500 INJECTION, SOLUTION INTRAVENOUS at 08:12

## 2020-07-01 NOTE — PN
PROGRESS NOTE



DATE OF SERVICE:

07/01/2020



REASON FOR FOLLOWUP:

Febrile neutropenia, possible abdominal source, and a question of pneumonia.



INTERVAL HISTORY:

The patient is currently afebrile.  The patient is breathing more comfortably.  The

patient denies having any chest pain or shortness of breath.  Occasional cough.  No

abdominal pain or diarrhea.



PHYSICAL EXAMINATION:

Blood pressure 122/67 with a pulse of 58, temperature 97.9. He is 93% on room air.

General description is an elderly male up in the chair in no distress.

RESPIRATORY SYSTEM: Unlabored breathing. Clear to auscultation anteriorly.

HEART: S1, S2.  Regular rate and rhythm.

ABDOMEN: Soft. No tenderness.



LABS:

Hemoglobin 6.3, white count 1.6, creatinine 0.69.  Sputum cultures currently pending.

Stool culture pending.



DIAGNOSTIC IMPRESSION AND PLAN:

Patient admitted to hospital with febrile neutropenia with pulmonary as well as

gastrointestinal symptoms.  Cultures are currently pending. Patient is covered with

cefepime and Flagyl; to continue. Will discontinue the vancomycin.  Waiting for the

sputum to finalize to determine discharge antibiotics.  Continue with supportive care.





MMODL / IJN: [id number] / Job#: [id number]

## 2020-07-01 NOTE — P.PN
Subjective


Progress Note Date: 07/01/20


Principal diagnosis: 





neutropenic fever, MDS





In f/u today pt is feeling ok, he is c/o of his hoarse voice and mucus that 

feels "stick", no chest pain, he is having some epigastric pain too, no nausea, 

vomiting, diarrhea  





Objective





- Vital Signs


Vital signs: 


                                   Vital Signs











Temp  97.5 F L  07/01/20 07:17


 


Pulse  66   07/01/20 07:17


 


Resp  16   07/01/20 07:17


 


BP  139/78   07/01/20 07:17


 


Pulse Ox  97   07/01/20 07:17








                                 Intake & Output











 06/30/20 07/01/20 07/01/20





 18:59 06:59 18:59


 


Intake Total 400 1450 


 


Balance 400 1450 


 


Intake:   


 


  IV  1450 


 


    Sodium Chloride 0.9% 1,  1200 





    000 ml @ 75 mls/hr IV .   





    [id number] FARIHA Rx#:[id number]   


 


    Vancomycin 1,000 mg In  250 





    Sodium Chloride 0.9% 250   





    ml @ 125 mls/hr IVPB Q12H   





    FARIHA Rx#:[id number]   


 


  Oral 400  


 


Other:   


 


  Voiding Method  Toilet Toilet


 


  # Voids 2 1 


 


  # Bowel Movements  1 














- Constitutional


General appearance: Present: cooperative, no acute distress, thin





- EENT


Eyes: Present: anicteric sclerae, EOMI


ENT: Present: hearing grossly normal





- Respiratory


Respiratory: bilateral: diminished





- Gastrointestinal


General gastrointestinal: Present: soft, tenderness


Localized gastrointestinal: tender: epigastric periumbilical





- Neurologic


Neurologic: Present: CNII-XII intact





- Musculoskeletal


Musculoskeletal: Present: strength equal bilaterally





- Psychiatric


Psychiatric: Present: A&O x's 3, appropriate affect





- Labs


CBC & Chem 7: 


                                 07/01/20 07:10





                                 07/01/20 07:10


Labs: 


                  Abnormal Lab Results - Last 24 Hours (Table)











  06/30/20 06/30/20 07/01/20 Range/Units





  11:21 11:21 07:10 


 


WBC   1.1 L*  1.6 L  (3.8-10.6)  k/uL


 


RBC   2.33 L  2.29 L  (4.30-5.90)  m/uL


 


Hgb   7.4 L  7.3 L  (13.0-17.5)  gm/dL


 


Hct   23.8 L  23.5 L  (39.0-53.0)  %


 


MCV   102.3 H  102.9 H  (80.0-100.0)  fL


 


RDW   23.1 H  23.1 H  (11.5-15.5)  %


 


Neutrophils #   0.4 L*   (1.3-7.7)  k/uL


 


Neutrophils # (Manual)    1.10 L  (1.3-7.7)  k/uL


 


Lymphocytes #   0.6 L   (1.0-4.8)  k/uL


 


Lymphocytes # (Manual)    0.43 L  (1.0-4.8)  k/uL


 


Myelocytes # (Manual)    0.02 H  (0)  k/uL


 


Macrocytosis   Marked A  Marked A  


 


Chloride  108 H    ()  mmol/L


 


BUN  24 H    (9-20)  mg/dL


 


Creatinine  0.65 L    (0.66-1.25)  mg/dL


 


Glucose  181 H    (74-99)  mg/dL


 


Calcium  7.6 L    (8.4-10.2)  mg/dL


 


AST  79 H    (17-59)  U/L


 


ALT  62 H    (4-49)  U/L


 


Albumin  2.9 L    (3.5-5.0)  g/dL








                      Microbiology - Last 24 Hours (Table)











 06/27/20 16:55 Blood Culture - Preliminary





 Blood    No Growth after 72 hours














Assessment and Plan


(1) MDS (myelodysplastic syndrome), high grade


Narrative/Plan: 


Pt recently diagnosed.  He has had 1 cycle of treatment with anticipated drop in

counts.  





6/29/20-Dr. Dos Santos reviewed with pt and wife diagnosis, treatment options-including

palliative care or hospice.  He reviewed the anticipated clinical course, both 

with and without MDS treatment- need for transfusions, possible hospitalizations

and increased risk for infection. It was not recommended that the pt opt for 

transfusions/GCSF adm as the sole course of action for the disease.  This method

of treating is typically short lived, especially when there is frequent 

dependence on transfusions (antibody formation, iron overload).  Pt has not 

responded to GCSF previously.  Treat the underlying disease with 

transfusion/GCSF support is the most appropriate course of action as it has the 

best possibility for disease control, improving QOL and reducing the need for 

transfusion.


All of wife's questions were answered to her satisfaction.  





In f/u today pt stating that he was told if he has chemo he will be dead in a 

few days.  I explained that he tolerated 1st cycle overall ok.  His greatest 

risk is death from infection.  We revisited the discussion as described above.  

I called and spoke to pt wife and clarified.  Recommended treatment options are 

either treat for at least 2 more cycles to see if the treatment can provide some

improvement in his counts to decrease his infection risk and transfusion needs. 

Knowing that with treatment that he needs to f/u as directed and he will likely 

require transfusions.  Option 2 is to have hospice treat symptoms.  I told his 

wife that we need to know what they want to do.  She will tell RN today what pt 

wants





Spoke with RN who is going to speak with ID for their abx recs, will let me know

what pt wants in regards to treatment so I can plan and she will inform 

Attending of pt decisions so they can plan discharge.  


Current Visit: Yes   Status: Acute   Priority: High   Code(s): D46.Z - OTHER 

MYELODYSPLASTIC SYNDROMES   SNOMED Code(s): 839226283


   





(2) Bicytopenia


Narrative/Plan: 


Cont GCSF for now. 


Transfuse irradiated blood products.


Transfuse for Hgb<7 and plt count <10K unless symptomatic, no transfusions ne

eded today


Current Visit: Yes   Status: Chronic   Priority: High   Code(s): D75.89 - OTHER 

SPECIFIED DISEASES OF BLOOD AND BLOOD-FORMING ORGANS   SNOMED Code(s): 10127045


   





(3) Neutropenic fever


Narrative/Plan: 


Pancultures, pending completion (yesterday cultures were negative to date).  D/W

ID briefly.  Plan is to treat pt then possibly keep on some prophylactic abx. 

Await ID recs.  Fever pattern abated.





Pt is going to be persistently neutropenic 2/2 MDS. Cont on zarxio while inpt.  

WBC 1.6 today


Current Visit: Yes   Status: Acute   Priority: High   Code(s): D70.9 - 

NEUTROPENIA, UNSPECIFIED; R50.81 - FEVER PRESENTING WITH CONDITIONS CLASSIFIED 

ELSEWHERE   SNOMED Code(s): 981467624

## 2020-07-01 NOTE — P.PN
Subjective


This is a 70-year-old patient of Dr. Luu with a past medical history of 

anemia, gout, tobacco use and dependence, daily alcohol use.  And cancer.  

Patient is unsure of the type of cancer that he has.  He has been receiving 

chemotherapy and blood transfusions.  He is under care with Dr. Dos Santos.  Patient 

presented yesterday for evaluation of fever and generalized weakness.  Patient a

transfusion yesterday morning at approximately 9 AM.  He was noted to have a 

fever during the afternoon.  Patient states that his most recent chemotherapy 

was approximately 2 weeks ago.  He denies any pain or at this time.  Denies any 

vomiting or diarrhea.  Patient states that he has been short of breath recently 

with any activity.  Denies any cough.  Patient is a current smoker.  Patient was

discharged June 25, and comes in again to the emergency room with the same 

problem, fever, failure of a cycle of year, and Levaquin, for which the regimen 

was complied on by family.  Patient was hesitant on going to hospice, and is not

feeling ready to be in a hospice program yet.





 Patient was admitted from June 21 until June 25, seen by oncology ID, discharge

med at that time a cycle of year Levaquin, along with other maintenance meds, he

received to admission 1 unit of packed red blood cell, as well as colony 

simulating factor, at that time he received Vanco supper, there was some 

suspicion of either a wedge like abnormality in the spleen, during his last CAT 

scan.  Fever persists at home, and started 24 hours after discharge.  Patient is

coughing, has diarrhea, 4 times watery, no blood, patient denies any aspiration 

events, patient continues to smoke, patient started on cefepime, and vancomycin,

Flagyl was added started secondary diarrhea.  Cultures were obtained ID consult 

oncology consult, patient has COPD exacerbation as well, as well as an o

rthopedic fever, and a new right upper lobe infiltrate, malignancy cannot be 

ruled out, CT chest to be done, with IV contrast.  Urinalysis negative.





6/29: Patient was evaluated on morning rounds. Denies any complaints. He is 

currently afebrile 97.7, blood pressure is 91/50, heart rate 67. WBC 0.9, Hgb 

8.7, Hct 29.5, neutrophils 0.10, blood cultures show no growth to date. He 

continues on Acyclovir, Cefepine, Vancomycin, and Metronidazole. Infectious 

disease is on consult. He continues on Zarxio, discussion with patient if WBC 

does not improve by Wednesday, may need transfer to [facility] in [place]. Oncology 

note reviewed, discussion with patient and wife regarding Hospice and different 

treatment options. Will follow up with their decision. 





6/30: Patient was examined this morning, noted to be sitting up at the bedside. 

Denies any new complaints.  Repeat white count this morning was 1.1, neutrophils

0.4, hemoglobin 7.4, hematocrit 23.8.  Patient remains on the cefepime, Flagyl, 

and vancomycin.  Awaiting further culture report, blood cultures show no growth 

to date, sputum cultures pending, infectious disease is on consult.  Vital signs

remain stable blood pressure 102/59, heart rate 65, temperature 97.7, 98% on 

room air.  Patient remains neutropenic despite treatment.  If labs do not 

improve by tomorrow may need transfer to [facility] in [place] for further treatment.





7/1: Patient noted to be sitting up at his bedside, resting comfortable.  He 

denies any new complaints.  He remains afebrile 97.5, heart rate 66, pressure 

139/78, 97% on room air.  White count and neutrophils did improve, WBC 1.6 

neutrophils 1.10, hemoglobin remained stable at 7.3 hematocrit 23.5.  Blood 

cultures still show no growth today he continues on a Acyclovir, cefepime, 

vancomycin, and metronidazole.  He continues on Zarxio.  Oncology note reviewed 

no plans to transfer patient, plans on continuing current treatment. 





Objective





- Vital Signs


Vital signs: 


                                   Vital Signs











Temp  97.5 F L  07/01/20 07:17


 


Pulse  66   07/01/20 07:17


 


Resp  16   07/01/20 07:17


 


BP  139/78   07/01/20 07:17


 


Pulse Ox  97   07/01/20 07:17








                                 Intake & Output











 06/30/20 07/01/20 07/01/20





 18:59 06:59 18:59


 


Intake Total 400 1450 


 


Balance 400 1450 


 


Intake:   


 


  IV  1450 


 


    Sodium Chloride 0.9% 1,  1200 





    000 ml @ 75 mls/hr IV .   





    [id number] FARIHA Rx#:[id number]   


 


    Vancomycin 1,000 mg In  250 





    Sodium Chloride 0.9% 250   





    ml @ 125 mls/hr IVPB Q12H   





    FARIHA Rx#:[id number]   


 


  Oral 400  


 


Other:   


 


  Voiding Method  Toilet Toilet


 


  # Voids 2 1 


 


  # Bowel Movements  1 














- Exam





- Constitutional


General appearance: cooperative, no acute distress, thin.





- EENT


Eyes: anicteric sclerae, EOMI, PERRLA, dentition normal, normal appearance


ENT: NA/AT, normal oropharynx





- Neck


Neck: normal ROM





- Respiratory


Respiratory: bilateral: wheezing, negative: CTA, diminished, dullness





- Cardiovascular


Rhythm: regular


Heart sounds: normal: S1


Abnormal Heart Sounds: no systolic murmur, no diastolic murmur, no rub, no S3 

Gallop, no S4 Gallop, no click, no other





- Gastrointestinal


General gastrointestinal: normal bowel sounds, soft





- Integumentary


Integumentary: decreased turgor, normal





- Neurologic


Neurologic: CNII-XII intact





- Musculoskeletal


Musculoskeletal: gait normal, strength equal bilaterally





- Psychiatric


Psychiatric: A&O x's 3, appropriate affect, intact judgment & insight








- Labs


CBC & Chem 7: 


                                 07/01/20 07:10





                                 07/01/20 07:10


Labs: 


                  Abnormal Lab Results - Last 24 Hours (Table)











  06/30/20 06/30/20 07/01/20 Range/Units





  11:21 11:21 07:10 


 


WBC   1.1 L*  1.6 L  (3.8-10.6)  k/uL


 


RBC   2.33 L  2.29 L  (4.30-5.90)  m/uL


 


Hgb   7.4 L  7.3 L  (13.0-17.5)  gm/dL


 


Hct   23.8 L  23.5 L  (39.0-53.0)  %


 


MCV   102.3 H  102.9 H  (80.0-100.0)  fL


 


RDW   23.1 H  23.1 H  (11.5-15.5)  %


 


Neutrophils #   0.4 L*   (1.3-7.7)  k/uL


 


Neutrophils # (Manual)    1.10 L  (1.3-7.7)  k/uL


 


Lymphocytes #   0.6 L   (1.0-4.8)  k/uL


 


Lymphocytes # (Manual)    0.43 L  (1.0-4.8)  k/uL


 


Myelocytes # (Manual)    0.02 H  (0)  k/uL


 


Macrocytosis   Marked A  Marked A  


 


Chloride  108 H    ()  mmol/L


 


BUN  24 H    (9-20)  mg/dL


 


Creatinine  0.65 L    (0.66-1.25)  mg/dL


 


Glucose  181 H    (74-99)  mg/dL


 


Calcium  7.6 L    (8.4-10.2)  mg/dL


 


AST  79 H    (17-59)  U/L


 


ALT  62 H    (4-49)  U/L


 


Albumin  2.9 L    (3.5-5.0)  g/dL








                      Microbiology - Last 24 Hours (Table)











 06/27/20 16:55 Blood Culture - Preliminary





 Blood    No Growth after 72 hours














Assessment and Plan


Plan: 





1.  Neutropenic fever with recurrent admission, sepsis with failure of Levaquin 

prior to admission.  Continue cefepime and vancomycin.  ID and oncology on 

consult. Blood culture repeated, has diarrhea, check for C. diff colitis, 

previous imaging studies 6/25 during last admission, shows 1.2 cm kidney cyst, 

not  abscess, wedge-shaped hypodensity in spleen, related to either infectious 

and inflammatory against ischemic etiology, neoplasm needs to be considered, 

trace left pelvic ascites moderate wall thickening gastroduodenal junction, 

check stools for H pylori as patient has persistent epigastric pain, cxr right 

infiltrate vs nodule IV Flagyl admitted for diarrhea, O&P obtained, as well as 

stool cultures, not collected yet 





2.  Poorly marginated right upper lobe infiltrate, 1.7 cm, CT of the chest, to 

evaluate for pulmonary nodule against pneumonia, IV antibiotics





3.  Persistent epigastric pain check for H. pylori, possible abnormal thickening

in the gastroduodenal junction, not an abscess, on PPI 





4.  COPD exacerbation currently smoker nebulized treatments, albuterol Pulmic

ort, low-dose steroids 40 mg every 6 hours short bursts, patient was advised to 

quit smoking still, and recent pro-calcitonin still elevated, IV antibiotics 

initiated, cultures sputum cultures in process





5 Chronic anemia.  See above.  Status post transfusion of 1 unit of packed RBCs,

stable.  Continue Zarxio





6  MDS with ringed sideroblasts see above





7  Tobacco use and dependence





8  Alcohol abuse, stable





9 Diarrhea, with ongoing antibiotic for sepsis, Flagyl added to regimen of 

cefepime Vanco, C. diff toxin consult with ID





10  GI prophylaxis.  Pantoprazole 40 mg IV daily





Severe Malnutrition, with protein calorie malnutrition, insulin light, 3 times a

day with meals





8.  DVT prophylaxis.  Ambulation





9.  COVID-19 infection not present





Discharge plan: Home without home care








The above impression and plan of care have been discussed and directed by 

signing physician. Caridad Rosales nurse practitioner acting as scribe for 

signing physician.

## 2020-07-02 LAB
ALBUMIN SERPL-MCNC: 2.9 G/DL (ref 3.5–5)
ALP SERPL-CCNC: 81 U/L (ref 38–126)
ALT SERPL-CCNC: 90 U/L (ref 4–49)
ANION GAP SERPL CALC-SCNC: 7 MMOL/L
AST SERPL-CCNC: 61 U/L (ref 17–59)
BUN SERPL-SCNC: 31 MG/DL (ref 9–20)
CALCIUM SPEC-MCNC: 8.2 MG/DL (ref 8.4–10.2)
CELLS COUNTED: 100
CHLORIDE SERPL-SCNC: 113 MMOL/L (ref 98–107)
CO2 SERPL-SCNC: 21 MMOL/L (ref 22–30)
ERYTHROCYTE [DISTWIDTH] IN BLOOD BY AUTOMATED COUNT: 2.58 M/UL (ref 4.3–5.9)
ERYTHROCYTE [DISTWIDTH] IN BLOOD: 23.2 % (ref 11.5–15.5)
GLUCOSE SERPL-MCNC: 164 MG/DL (ref 74–99)
HCT VFR BLD AUTO: 26.5 % (ref 39–53)
HGB BLD-MCNC: 7.9 GM/DL (ref 13–17.5)
LYMPHOCYTES # BLD MANUAL: 0.38 K/UL (ref 1–4.8)
MCH RBC QN AUTO: 30.4 PG (ref 25–35)
MCHC RBC AUTO-ENTMCNC: 29.6 G/DL (ref 31–37)
MCV RBC AUTO: 102.7 FL (ref 80–100)
METAMYELOCYTES # BLD: 0.05 K/UL
MONOCYTES # BLD MANUAL: 0.38 K/UL (ref 0–1)
MYELOCYTES # BLD MANUAL: 0.03 K/UL
NEUTROPHILS NFR BLD MANUAL: 64 %
NEUTS SEG # BLD MANUAL: 1.8 K/UL (ref 1.3–7.7)
PLATELET # BLD AUTO: 287 K/UL (ref 150–450)
POTASSIUM SERPL-SCNC: 4 MMOL/L (ref 3.5–5.1)
PROT SERPL-MCNC: 7.4 G/DL (ref 6.3–8.2)
SODIUM SERPL-SCNC: 141 MMOL/L (ref 137–145)
WBC # BLD AUTO: 2.7 K/UL (ref 3.8–10.6)

## 2020-07-02 RX ADMIN — ACYCLOVIR SCH MG: 200 CAPSULE ORAL at 07:54

## 2020-07-02 RX ADMIN — METHYLPREDNISOLONE SODIUM SUCCINATE SCH MG: 125 INJECTION, POWDER, FOR SOLUTION INTRAMUSCULAR; INTRAVENOUS at 11:07

## 2020-07-02 RX ADMIN — IOPAMIDOL PRN ML: 612 INJECTION, SOLUTION INTRAVENOUS at 08:37

## 2020-07-02 RX ADMIN — METHYLPREDNISOLONE SODIUM SUCCINATE SCH MG: 125 INJECTION, POWDER, FOR SOLUTION INTRAMUSCULAR; INTRAVENOUS at 17:31

## 2020-07-02 RX ADMIN — CEFEPIME HYDROCHLORIDE SCH MLS/HR: 2 INJECTION, POWDER, FOR SOLUTION INTRAVENOUS at 14:53

## 2020-07-02 RX ADMIN — BUDESONIDE AND FORMOTEROL FUMARATE DIHYDRATE SCH PUFF: 80; 4.5 AEROSOL RESPIRATORY (INHALATION) at 19:56

## 2020-07-02 RX ADMIN — PANTOPRAZOLE SODIUM SCH MG: 40 TABLET, DELAYED RELEASE ORAL at 07:55

## 2020-07-02 RX ADMIN — CEFAZOLIN SCH MLS/HR: 330 INJECTION, POWDER, FOR SOLUTION INTRAMUSCULAR; INTRAVENOUS at 12:12

## 2020-07-02 RX ADMIN — FILGRASTIM-SNDZ SCH MCG: 480 INJECTION, SOLUTION INTRAVENOUS; SUBCUTANEOUS at 07:55

## 2020-07-02 RX ADMIN — METRONIDAZOLE SCH MLS/HR: 500 INJECTION, SOLUTION INTRAVENOUS at 16:11

## 2020-07-02 RX ADMIN — IOPAMIDOL PRN ML: 612 INJECTION, SOLUTION INTRAVENOUS at 09:43

## 2020-07-02 RX ADMIN — FLUCONAZOLE SCH MG: 100 TABLET ORAL at 07:54

## 2020-07-02 RX ADMIN — ACYCLOVIR SCH MG: 200 CAPSULE ORAL at 20:43

## 2020-07-02 RX ADMIN — ACYCLOVIR SCH MG: 200 CAPSULE ORAL at 14:53

## 2020-07-02 RX ADMIN — BUDESONIDE AND FORMOTEROL FUMARATE DIHYDRATE SCH PUFF: 80; 4.5 AEROSOL RESPIRATORY (INHALATION) at 07:53

## 2020-07-02 RX ADMIN — CEFEPIME HYDROCHLORIDE SCH MLS/HR: 2 INJECTION, POWDER, FOR SOLUTION INTRAVENOUS at 07:02

## 2020-07-02 RX ADMIN — METHYLPREDNISOLONE SODIUM SUCCINATE SCH MG: 125 INJECTION, POWDER, FOR SOLUTION INTRAMUSCULAR; INTRAVENOUS at 06:21

## 2020-07-02 RX ADMIN — METRONIDAZOLE SCH MLS/HR: 500 INJECTION, SOLUTION INTRAVENOUS at 07:55

## 2020-07-02 NOTE — CT
EXAMINATION TYPE: CT abdomen pelvis w con

 

DATE OF EXAM: 7/2/2020

 

COMPARISON: 6/28/2020

 

HISTORY: Generalized pain

 

CT DLP: 680.6 mGycm

 

CONTRAST: 

CT scan of the abdomen and pelvis is performed with Oral Contrast and with IV Contrast, patient injec
valerie with 100 mL of Isovue 300.

 

FINDINGS: 

LUNG BASES-: Diaphragmatic calcifications noted. Small right-sided pleural effusion. Interstitial inf
iltrates right middle lobe and left lower lobe. Recent CT described pulmonary masses.

 

LIVER/GB:  Small amount of ascites about the liver edge and pelvis. No calcified gallstones.  No spac
e occupying hepatic lesion. Biliary tree is of normal caliber. 

 

PANCREAS:  No inflammation.  No distinct mass. 

 

SPLEEN:  No splenic enlargement. Hypoattenuating focus of the spleen unchanged from prior study.

 

ADRENALS:  No nodule.  No thickening. 

 

KIDNEYS/BLADDER:  No hydronephrosis.  No nephrolithiasis.  No distinct renal mass.  Urinary bladder g
rossly unremarkable. 

 

BOWEL: Mass density versus adherent debris within the cecum. Correlate clinically and consider direct
 visualization if felt to be indicated.  Normal bowel caliber.  No inflammation.

 

GENITAL ORGANS:  No gross abnormality. 

 

LYMPH NODES:  No greater than 1cm abdominal or pelvic lymph nodes are appreciated.

 

AORTA: No significant abnormality. 

 

OSSEOUS STRUCTURES:  No significant abnormality is seen. 

 

OTHER:  No significant additional abnormality is seen. 

 

IMPRESSION: 

1. New left small right-sided pleural effusion. Interstitial infiltrates at the lung bases and right 
middle lobe as noted. Correlate clinically.

2. Mass versus adherent debris within the cecum. See above.

3. Small amount of ascites.

## 2020-07-02 NOTE — P.PN
Subjective


This is a 70-year-old patient of Dr. Luu with a past medical history of 

anemia, gout, tobacco use and dependence, daily alcohol use.  And cancer.  

Patient is unsure of the type of cancer that he has.  He has been receiving 

chemotherapy and blood transfusions.  He is under care with Dr. Dos Santos.  Patient 

presented yesterday for evaluation of fever and generalized weakness.  Patient a

transfusion yesterday morning at approximately 9 AM.  He was noted to have a 

fever during the afternoon.  Patient states that his most recent chemotherapy 

was approximately 2 weeks ago.  He denies any pain or at this time.  Denies any 

vomiting or diarrhea.  Patient states that he has been short of breath recently 

with any activity.  Denies any cough.  Patient is a current smoker.  Patient was

discharged June 25, and comes in again to the emergency room with the same 

problem, fever, failure of a cycle of year, and Levaquin, for which the regimen 

was complied on by family.  Patient was hesitant on going to hospice, and is not

feeling ready to be in a hospice program yet.





 Patient was admitted from June 21 until June 25, seen by oncology ID, discharge

med at that time a cycle of year Levaquin, along with other maintenance meds, he

received to admission 1 unit of packed red blood cell, as well as colony 

simulating factor, at that time he received Vanco supper, there was some 

suspicion of either a wedge like abnormality in the spleen, during his last CAT 

scan.  Fever persists at home, and started 24 hours after discharge.  Patient is

coughing, has diarrhea, 4 times watery, no blood, patient denies any aspiration 

events, patient continues to smoke, patient started on cefepime, and vancomycin,

Flagyl was added started secondary diarrhea.  Cultures were obtained ID consult 

oncology consult, patient has COPD exacerbation as well, as well as an o

rthopedic fever, and a new right upper lobe infiltrate, malignancy cannot be 

ruled out, CT chest to be done, with IV contrast.  Urinalysis negative.





6/29: Patient was evaluated on morning rounds. Denies any complaints. He is 

currently afebrile 97.7, blood pressure is 91/50, heart rate 67. WBC 0.9, Hgb 

8.7, Hct 29.5, neutrophils 0.10, blood cultures show no growth to date. He 

continues on Acyclovir, Cefepine, Vancomycin, and Metronidazole. Infectious 

disease is on consult. He continues on Zarxio, discussion with patient if WBC 

does not improve by Wednesday, may need transfer to [facility] in [place]. Oncology 

note reviewed, discussion with patient and wife regarding Hospice and different 

treatment options. Will follow up with their decision. 





6/30: Patient was examined this morning, noted to be sitting up at the bedside. 

Denies any new complaints.  Repeat white count this morning was 1.1, neutrophils

0.4, hemoglobin 7.4, hematocrit 23.8.  Patient remains on the cefepime, Flagyl, 

and vancomycin.  Awaiting further culture report, blood cultures show no growth 

to date, sputum cultures pending, infectious disease is on consult.  Vital signs

remain stable blood pressure 102/59, heart rate 65, temperature 97.7, 98% on 

room air.  Patient remains neutropenic despite treatment.  If labs do not 

improve by tomorrow may need transfer to [facility] in [place] for further treatment.





7/1: Patient noted to be sitting up at his bedside, resting comfortable.  He 

denies any new complaints.  He remains afebrile 97.5, heart rate 66, pressure 

139/78, 97% on room air.  White count and neutrophils did improve, WBC 1.6 

neutrophils 1.10, hemoglobin remained stable at 7.3 hematocrit 23.5.  Blood 

cultures still show no growth today he continues on a Acyclovir, cefepime, 

vancomycin, and metronidazole.  He continues on Zarxio.  Oncology note reviewed 

no plans to transfer patient, plans on continuing current treatment. 





7/: Patient sitting up at the side of the bed, has complaints of upper abdominal

pain that started this morning. On exam he is tender with palpitation, he denies

any constipation or vomiting. States last bowel movement was this morning. CT of

the abdomen ordered as well as consult to surgery to rule out abscess. Labs are 

slowly improving, WBC 2.7, Hgb 7.9, Hct 26.5, Neutrophils are 1.8. He remains on

cefepime and Flagyl and acyclovir for prophylaxis, vancomycin was discontinued 

per infectious disease. He continues on GCSF, oncology noted reviewed, options 

to end treatment or treat for another 2 cycles to see if the treatment improves 

his counts to decrease infection risk and transfusion needs. Family and patient 

has not made a decision yet. Vitals are stable 138/77, 78, 14, 97%, he remains 

afbrile 97.8. 





Objective





- Vital Signs


Vital signs: 


                                   Vital Signs











Temp  97.8 F   07/02/20 06:52


 


Pulse  78   07/02/20 06:52


 


Resp  14   07/02/20 06:52


 


BP  138/77   07/02/20 06:52


 


Pulse Ox  97   07/02/20 06:52








                                 Intake & Output











 07/01/20 07/02/20 07/02/20





 18:59 06:59 18:59


 


Intake Total 1080 800 


 


Balance 1080 800 


 


Weight 60.328 kg  


 


Intake:   


 


  Intake, IV Titration  800 





  Amount   


 


    Cefepime 2 gm In Sodium  100 





    Chloride 0.9% 100 ml @   





    200 mls/hr IVPB Q8HR [facility]   





    Rx#:[id number]   


 


    Sodium Chloride 0.9% 1,  600 





    000 ml @ 75 mls/hr IV .   





    [id number] [facility] Rx#:[id number]   


 


    metroNIDAZOLE-NS   100 





    mg In Saline 1 100ml.bag   





    @ 100 mls/hr IVPB Q8HR   





    [facility] Rx#:[id number]   


 


  Oral 1080  


 


Other:   


 


  Voiding Method Toilet Toilet 


 


  # Voids 2 1 1


 


  # Bowel Movements  1 














- Exam





- Constitutional


General appearance: cooperative, no acute distress, thin.





- EENT


Eyes: anicteric sclerae, EOMI, PERRLA, dentition normal, normal appearance


ENT: NA/AT, normal oropharynx





- Neck


Neck: normal ROM





- Respiratory


Respiratory: bilateral: wheezing, negative: CTA, diminished, dullness





- Cardiovascular


Rhythm: regular


Heart sounds: normal: S1


Abnormal Heart Sounds: no systolic murmur, no diastolic murmur, no rub, no S3 

Gallop, no S4 Gallop, no click, no other





- Gastrointestinal


General gastrointestinal: normal bowel sounds, soft, tender with palpitation





- Integumentary


Integumentary: decreased turgor, normal





- Neurologic


Neurologic: CNII-XII intact





- Musculoskeletal


Musculoskeletal: gait normal, strength equal bilaterally





- Psychiatric


Psychiatric: A&O x's 3, appropriate affect, intact judgment & insight








- Labs


CBC & Chem 7: 


                                 07/02/20 07:56





                                 07/02/20 07:56


Labs: 


                  Abnormal Lab Results - Last 24 Hours (Table)











  07/02/20 07/02/20 Range/Units





  07:56 07:56 


 


WBC  2.7 L   (3.8-10.6)  k/uL


 


RBC  2.58 L   (4.30-5.90)  m/uL


 


Hgb  7.9 L   (13.0-17.5)  gm/dL


 


Hct  26.5 L   (39.0-53.0)  %


 


MCV  102.7 H   (80.0-100.0)  fL


 


MCHC  29.6 L   (31.0-37.0)  g/dL


 


RDW  23.2 H   (11.5-15.5)  %


 


Lymphocytes # (Manual)  0.38 L   (1.0-4.8)  k/uL


 


Metamyelocytes # (Man)  0.05 H   (0)  k/uL


 


Myelocytes # (Manual)  0.03 H   (0)  k/uL


 


Macrocytosis  Marked A   


 


Chloride   113 H  ()  mmol/L


 


Carbon Dioxide   21 L  (22-30)  mmol/L


 


BUN   31 H  (9-20)  mg/dL


 


Glucose   164 H  (74-99)  mg/dL


 


Calcium   8.2 L  (8.4-10.2)  mg/dL


 


AST   61 H  (17-59)  U/L


 


ALT   90 H  (4-49)  U/L


 


Albumin   2.9 L  (3.5-5.0)  g/dL








                      Microbiology - Last 24 Hours (Table)











 06/27/20 16:55 Blood Culture - Preliminary





 Blood    No Growth after 96 hours


 


 07/01/20 06:00 Stool Culture - Preliminary





 Stool 














Assessment and Plan


Plan: 





1.  Neutropenic fever with recurrent admission, sepsis with failure of Levaquin 

prior to admission.  Continue cefepime and vancomycin.  ID and oncology on 

consult. Blood culture repeated, has diarrhea, check for C. diff colitis, 

previous imaging studies 6/25 during last admission, shows 1.2 cm kidney cyst, 

not  abscess, wedge-shaped hypodensity in spleen, related to either infectious 

and inflammatory against ischemic etiology, neoplasm needs to be considered, t

race left pelvic ascites moderate wall thickening gastroduodenal junction, check

stools for H pylori as patient has persistent epigastric pain, cxr right 

infiltrate vs nodule IV Flagyl admitted for diarrhea, O&P obtained, as well as 

stool cultures, still cultures have not been obtained yet, vancomycin 

discontinued. 





2.  Poorly marginated right upper lobe infiltrate, 1.7 cm, CT of the chest, to 

evaluate for pulmonary nodule against pneumonia, IV antibiotics





3.  Persistent epigastric pain check for H. pylori, possible abnormal thickening

in the gastroduodenal junction, not an abscess, on PPI offered





4.  COPD exacerbation currently smoker nebulized treatments, albuterol 

Pulmicort, low-dose steroids 40 mg every 6 hours short bursts, patient was 

advised to quit smoking still, and recent pro-calcitonin still elevated, IV 

antibiotics initiated, cultures sputum cultures if available





5 Chronic anemia.  See above.  Status post transfusion of 1 unit of packed RBCs,

stable.  Continue Zarxio





6  MDS with ringed sideroblasts see above





7  Tobacco use and dependence





8  Alcohol abuse, stable





9 Diarrhea, with ongoing antibiotic for sepsis, Flagyl added to regimen of 

cefepime Jovanni C. diff toxin consult with ID





10  GI prophylaxis.  Pantoprazole 40 mg IV daily





11. Severe Malnutrition, with protein calorie malnutrition, insulin light, 3 

times a day with meals





12.  DVT prophylaxis.  Ambulation





13.  COVID-19 infection not present





14. abdominal pain: CT ordered to rule out abscess, surgery on consult 


Discharge plan: Home without home care








The above impression and plan of care have been discussed and directed by 

signing physician. Caridad Rosales nurse practitioner acting as scribe for 

signing physician.

## 2020-07-02 NOTE — P.PN
Subjective


Progress Note Date: 07/02/20


Principal diagnosis: 





neutropenic fever, MDS





In f/u today pt is feeling ok, he is sitting up at bedside, denies fever, 

nausea, appetite is stable, SOB on exertion is stable, no progressive cough, abd

pain is persistent, he had a soft stool today, denied black or bloody stool, no 

swelling in the legs





Objective





- Vital Signs


Vital signs: 


                                   Vital Signs











Temp  97.8 F   07/02/20 06:52


 


Pulse  78   07/02/20 06:52


 


Resp  14   07/02/20 06:52


 


BP  138/77   07/02/20 06:52


 


Pulse Ox  97   07/02/20 06:52








                                 Intake & Output











 07/01/20 07/02/20 07/02/20





 18:59 06:59 18:59


 


Intake Total 2060 144 7031


 


Balance 9939 893 6637


 


Weight 60.328 kg  


 


Intake:   


 


  Intake, IV Titration  800 





  Amount   


 


    Cefepime 2 gm In Sodium  100 





    Chloride 0.9% 100 ml @   





    200 mls/hr IVPB Q8HR [facility]   





    Rx#:[id number]   


 


    Sodium Chloride 0.9% 1,  600 





    000 ml @ 75 mls/hr IV .   





    [id number] [facility] Rx#:[id number]   


 


    metroNIDAZOLE-NS   100 





    mg In Saline 1 100ml.bag   





    @ 100 mls/hr IVPB Q8HR   





    [facility] Rx#:[id number]   


 


  Oral 1080  1000


 


Other:   


 


  Voiding Method Toilet Toilet 


 


  # Voids 2 1 1


 


  # Bowel Movements  1 














- Constitutional


General appearance: Present: cooperative, no acute distress, thin





- EENT


Eyes: Present: anicteric sclerae, EOMI


ENT: Present: hearing grossly normal





- Respiratory


Respiratory: bilateral: diminished





- Cardiovascular


Heart sounds: normal: S1, S2





- Gastrointestinal


Gastrointestinal Comment(s): 





no rebound


General gastrointestinal: Present: tenderness


Localized gastrointestinal: tender: epigastric periumbilical





- Neurologic


Neurologic: Present: CNII-XII intact





- Musculoskeletal


Musculoskeletal: Present: strength equal bilaterally





- Psychiatric


Psychiatric: Present: A&O x's 3, appropriate affect, intact judgment & insight





- Labs


CBC & Chem 7: 


                                 07/02/20 07:56





                                 07/02/20 07:56


Labs: 


                  Abnormal Lab Results - Last 24 Hours (Table)











  07/02/20 07/02/20 Range/Units





  07:56 07:56 


 


WBC  2.7 L   (3.8-10.6)  k/uL


 


RBC  2.58 L   (4.30-5.90)  m/uL


 


Hgb  7.9 L   (13.0-17.5)  gm/dL


 


Hct  26.5 L   (39.0-53.0)  %


 


MCV  102.7 H   (80.0-100.0)  fL


 


MCHC  29.6 L   (31.0-37.0)  g/dL


 


RDW  23.2 H   (11.5-15.5)  %


 


Lymphocytes # (Manual)  0.38 L   (1.0-4.8)  k/uL


 


Metamyelocytes # (Man)  0.05 H   (0)  k/uL


 


Myelocytes # (Manual)  0.03 H   (0)  k/uL


 


Macrocytosis  Marked A   


 


Chloride   113 H  ()  mmol/L


 


Carbon Dioxide   21 L  (22-30)  mmol/L


 


BUN   31 H  (9-20)  mg/dL


 


Glucose   164 H  (74-99)  mg/dL


 


Calcium   8.2 L  (8.4-10.2)  mg/dL


 


AST   61 H  (17-59)  U/L


 


ALT   90 H  (4-49)  U/L


 


Albumin   2.9 L  (3.5-5.0)  g/dL








                      Microbiology - Last 24 Hours (Table)











 06/29/20 08:08 Gram Stain - Final





 Sputum Sputum Culture - Final





    Candida glabrata





    Katie albicans


 


 06/27/20 16:55 Blood Culture - Preliminary





 Blood    No Growth after 96 hours


 


 07/01/20 06:00 Stool Culture - Preliminary





 Stool 














- Imaging and Cardiology


CT scan - abdomen: report reviewed


CT scan - pelvis: report reviewed





Assessment and Plan


(1) MDS (myelodysplastic syndrome), high grade


Narrative/Plan: 


Pt recently diagnosed.  He has had 1 cycle of treatment with anticipated drop in

counts. He is on GCSF and he has had a slight response, where he has not 

previously. CBC with diff in AM.


Plan is to add GCSF to treatment regimen 








Current Visit: Yes   Status: Acute   Priority: High   Code(s): D46.Z - OTHER 

MYELODYSPLASTIC SYNDROMES   SNOMED Code(s): 621099706


   





(2) Bicytopenia


Narrative/Plan: 


Cont GCSF for now. 


Transfuse irradiated blood products.


Transfuse for Hgb<7 and plt count <10K unless symptomatic, no transfusions 

needed today


Current Visit: Yes   Status: Chronic   Priority: High   Code(s): D75.89 - OTHER 

SPECIFIED DISEASES OF BLOOD AND BLOOD-FORMING ORGANS   SNOMED Code(s): 45987502


   





(3) Neutropenic fever


Narrative/Plan: 


Pancultures, pending completion and ID recs.  Plan is to treat pt then possibly 

keep on some prophylactic abx. Fever pattern abated.





Pt is going to be persistently neutropenic 2/2 MDS and then due to treatment. 

Cont on zarxio while inpt.  Plan to add GCSF to treatment regimen.  WBC 2.7 

today


Current Visit: Yes   Status: Acute   Priority: High   Code(s): D70.9 - 

NEUTROPENIA, UNSPECIFIED; R50.81 - FEVER PRESENTING WITH CONDITIONS CLASSIFIED 

ELSEWHERE   SNOMED Code(s): 677655513


   


Plan: 





CT AP for abd pain.  Abnormality at cecum, Surgery consulted.


Case Discussed with IM and Nursing

## 2020-07-02 NOTE — P.GSCN
<Glenys Gutierrez - Last Filed: 20 13:41>





History of Present Illness


Consult date: 20


Reason for Consult: 





abdominal pain


Requesting physician: Yogesh Mendoza


History of present illness: 





CHIEF COMPLAINT: Abdominal pain





HISTORY OF PRESENT ILLNESS: This is a 70-year-old male with history of myel

odysplastic syndrome who was recently undergoing chemotherapy but has since 

stopped. He presented to the ER due to fevers. Patient was recently hospitalized

for fevers and possible colitis. General surgery was consulted for abdominal 

pain. Patient examined this morning at the bedside. He reports pain near the 

epigastric region. Denies nausea or vomiting. Reports decreased oral intake due 

to lack of appetite. Apparently patient has been having diarrhea, but he reports

a soft bowel movement today. Cdiff is negative. Stool culture pending.





PAST MEDICAL HISTORY: 


See list.





PAST SURGICAL HISTORY: 


See list.





SOCIAL HISTORY: No illicit drug use.  





REVIEW OF SYSTEMS: 


CONSTITUTIONAL: Reports fever


HEENT: Denies blurred vision, vision changes, or eye pain. Denies hemoptysis 


CARDIOVASCULAR: Denies chest pain or pressure.


RESPIRATORY: No shortness of breath. 


GASTROINTESTINAL: Refer to HPI for pertinent findings


HEMATOLOGIC: Denies bleeding disorders.


GENITOURINARY:  Denies any blood in urine.


SKIN: Denies pruitis. Denies rash.





PHYSICAL EXAM: 


VITAL SIGNS: Reviewed.


GENERAL: Well-developed in no acute distress. 


HEENT:  No sclera icterus. Extraocular movements grossly intact.  Moist buccal 

mucosa. Head is atraumatic, normocephalic. 


ABDOMEN:  Soft. Tenderness with palpation near epigastric region.


NEUROLOGIC: Alert and oriented. Cranial nerves II through XII grossly intact.





LABORATORY DATA:


WBC 2.7.  Hemoglobin 7.9.  Platelet count 287.





IMAGING:


Chest/abdomen CT: Multiple pulmonary densities are nonspecific.  Tumor is 

possible.  Enlarged abdominal retroperitoneal lymph nodes unchanged compared to 

recent exam.  Hypodensity in the posterior cortex of the spleen suggestive of 

infarct unchanged.  Left-sided perinephric fat stranding of uncertain 

significance unchanged.





ASSESSMENT: 


1.  Abdominal pain


2.  Myelodysplastic syndrome





PLAN: 


-Diet as tolerated


-Monitor labs


-CT abdomen and pelvis ordered. Await results


-Patient will be evaluated by Dr. Kern. Further recommendations pending.





Nurse practitioner note has been reviewed by physician. Signing provider agrees 

with the documented findings, assessment, and plan of care. 








Past Medical History


Past Medical History: Cancer, Skin Disorder


Additional Past Medical History / Comment(s): Gout, cellulitis to left leg? 

(patient states he is itchy, some scabs and scarring noted), chronic anemia, 

bone marrow cancer


History of Any Multi-Drug Resistant Organisms: None Reported


Past Surgical History: No Surgical Hx Reported


Additional Past Surgical History / Comment(s): surgery on rt leg and foot after 

injury age 3


Past Anesthesia/Blood Transfusion Reactions: No Reported Reaction


Past Psychological History: No Psychological Hx Reported


Smoking Status: Current every day smoker


Past Alcohol Use History: Daily


Additional Past Alcohol Use History / Comment(s): Patient states he smokes 1/2 

ppd.  Patient drinks 4-6 beers per day for many years and cut down to 1-2 beers 

per day for the past 2 months.  Patient lives at home with his wife.


Past Drug Use History: None Reported





- Past Family History


  ** Mother


Family Medical History: Cancer





  ** Brother(s)


Family Medical History: Cancer





  ** Father


Additional Family Medical History / Comment(s): Father  at age 93 from old 

age.





Medications and Allergies


                                Home Medications











 Medication  Instructions  Recorded  Confirmed  Type


 


Acyclovir 400 mg PO TID #90 tablet 20 Rx


 


Fluconazole [Diflucan] 100 mg PO DAILY #30 tab 20 Rx


 


Acetaminophen Tab [Tylenol] 650 mg PO Q6HR PRN  tab 20 Rx


 


Levofloxacin [Levaquin] 500 mg PO DAILY 3 Days #14 tab 20 Rx


 


Multivitamins, Thera [Multivitamin 1 tab PO DAILY 20 History





(formulary)]    








                                    Allergies











Allergy/AdvReac Type Severity Reaction Status Date / Time


 


No Known Allergies Allergy   Verified 20 19:12














Surgical - Exam


                                   Vital Signs











Temp Pulse Resp BP Pulse Ox


 


 101.2 F H  81   16   126/69   98 


 


 20 16:33  20 16:33  20 16:33  20 16:33  20 16:33














Results





- Labs





                                 20 07:56





                                 20 07:56


                  Abnormal Lab Results - Last 24 Hours (Table)











  20 Range/Units





  07:56 07:56 


 


WBC  2.7 L   (3.8-10.6)  k/uL


 


RBC  2.58 L   (4.30-5.90)  m/uL


 


Hgb  7.9 L   (13.0-17.5)  gm/dL


 


Hct  26.5 L   (39.0-53.0)  %


 


MCV  102.7 H   (80.0-100.0)  fL


 


MCHC  29.6 L   (31.0-37.0)  g/dL


 


RDW  23.2 H   (11.5-15.5)  %


 


Lymphocytes # (Manual)  0.38 L   (1.0-4.8)  k/uL


 


Metamyelocytes # (Man)  0.05 H   (0)  k/uL


 


Myelocytes # (Manual)  0.03 H   (0)  k/uL


 


Macrocytosis  Marked A   


 


Chloride   113 H  ()  mmol/L


 


Carbon Dioxide   21 L  (22-30)  mmol/L


 


BUN   31 H  (9-20)  mg/dL


 


Glucose   164 H  (74-99)  mg/dL


 


Calcium   8.2 L  (8.4-10.2)  mg/dL


 


AST   61 H  (17-59)  U/L


 


ALT   90 H  (4-49)  U/L


 


Albumin   2.9 L  (3.5-5.0)  g/dL








                      Microbiology - Last 24 Hours (Table)











 20 16:55 Blood Culture - Preliminary





 Blood    No Growth after 96 hours


 


 20 06:00 Stool Culture - Preliminary





 Stool 








                                 Diabetes panel











  20 Range/Units





  07:56 


 


Sodium  141  (137-145)  mmol/L


 


Potassium  4.0  (3.5-5.1)  mmol/L


 


Chloride  113 H  ()  mmol/L


 


Carbon Dioxide  21 L  (22-30)  mmol/L


 


BUN  31 H  (9-20)  mg/dL


 


Creatinine  0.76  (0.66-1.25)  mg/dL


 


Glucose  164 H  (74-99)  mg/dL


 


Calcium  8.2 L  (8.4-10.2)  mg/dL


 


AST  61 H  (17-59)  U/L


 


ALT  90 H  (4-49)  U/L


 


Alkaline Phosphatase  81  ()  U/L


 


Total Protein  7.4  (6.3-8.2)  g/dL


 


Albumin  2.9 L  (3.5-5.0)  g/dL








                                  Calcium panel











  20 Range/Units





  07:56 


 


Calcium  8.2 L  (8.4-10.2)  mg/dL


 


Albumin  2.9 L  (3.5-5.0)  g/dL








                                 Pituitary panel











  20 Range/Units





  07:56 


 


Sodium  141  (137-145)  mmol/L


 


Potassium  4.0  (3.5-5.1)  mmol/L


 


Chloride  113 H  ()  mmol/L


 


Carbon Dioxide  21 L  (22-30)  mmol/L


 


BUN  31 H  (9-20)  mg/dL


 


Creatinine  0.76  (0.66-1.25)  mg/dL


 


Glucose  164 H  (74-99)  mg/dL


 


Calcium  8.2 L  (8.4-10.2)  mg/dL








                                  Adrenal panel











  20 Range/Units





  07:56 


 


Sodium  141  (137-145)  mmol/L


 


Potassium  4.0  (3.5-5.1)  mmol/L


 


Chloride  113 H  ()  mmol/L


 


Carbon Dioxide  21 L  (22-30)  mmol/L


 


BUN  31 H  (9-20)  mg/dL


 


Creatinine  0.76  (0.66-1.25)  mg/dL


 


Glucose  164 H  (74-99)  mg/dL


 


Calcium  8.2 L  (8.4-10.2)  mg/dL


 


Total Bilirubin  0.7  (0.2-1.3)  mg/dL


 


AST  61 H  (17-59)  U/L


 


ALT  90 H  (4-49)  U/L


 


Alkaline Phosphatase  81  ()  U/L


 


Total Protein  7.4  (6.3-8.2)  g/dL


 


Albumin  2.9 L  (3.5-5.0)  g/dL














<Orlando Kern - Last Filed: 20 14:54>





History of Present Illness


Consult date: 20





Surgical - Exam


Osteopathic Statement: *.  No significant issues noted on an osteopathic 

structural exam other than those noted in the History and Physical/Consult.


                                   Vital Signs











Temp Pulse Resp BP Pulse Ox


 


 101.2 F H  81   16   126/69   98 


 


 20 16:33  20 16:33  20 16:33  20 16:33  20 16:33














- General


cachectic, chronically ill





- Respiratory





On NC





- Abdomen





S/ND/mild TTP midepigastric





Results





- Labs





                                 20 09:30





                                 20 09:30


                  Abnormal Lab Results - Last 24 Hours (Table)











  20 Range/Units





  09:30 09:30 


 


RBC  2.53 L   (4.30-5.90)  m/uL


 


Hgb  8.0 L   (13.0-17.5)  gm/dL


 


Hct  26.4 L   (39.0-53.0)  %


 


MCV  104.3 H   (80.0-100.0)  fL


 


MCHC  30.2 L   (31.0-37.0)  g/dL


 


RDW  23.1 H   (11.5-15.5)  %


 


Lymphocytes # (Manual)  0.34 L   (1.0-4.8)  k/uL


 


Metamyelocytes # (Man)  0.20 H   (0)  k/uL


 


Macrocytosis  Marked A   


 


Chloride   113 H  ()  mmol/L


 


BUN   32 H  (9-20)  mg/dL


 


Glucose   184 H  (74-99)  mg/dL


 


Calcium   8.3 L  (8.4-10.2)  mg/dL


 


ALT   83 H  (4-49)  U/L


 


Albumin   3.0 L  (3.5-5.0)  g/dL








                      Microbiology - Last 24 Hours (Table)











 20 06:00 Stool Culture - Preliminary





 Stool 


 


 20 16:55 Blood Culture - Preliminary





 Blood    No Growth after 120 hours


 


 20 08:08 Gram Stain - Final





 Sputum Sputum Culture - Final





    Candida glabrata





    Katie albicans








                                 Diabetes panel











  20 Range/Units





  09:30 


 


Sodium  142  (137-145)  mmol/L


 


Potassium  4.9  (3.5-5.1)  mmol/L


 


Chloride  113 H  ()  mmol/L


 


Carbon Dioxide  22  (22-30)  mmol/L


 


BUN  32 H  (9-20)  mg/dL


 


Creatinine  0.70  (0.66-1.25)  mg/dL


 


Glucose  184 H  (74-99)  mg/dL


 


Calcium  8.3 L  (8.4-10.2)  mg/dL


 


AST  50  (17-59)  U/L


 


ALT  83 H  (4-49)  U/L


 


Alkaline Phosphatase  81  ()  U/L


 


Total Protein  7.8  (6.3-8.2)  g/dL


 


Albumin  3.0 L  (3.5-5.0)  g/dL








                                  Calcium panel











  20 Range/Units





  09:30 


 


Calcium  8.3 L  (8.4-10.2)  mg/dL


 


Albumin  3.0 L  (3.5-5.0)  g/dL








                                 Pituitary panel











  20 Range/Units





  09:30 


 


Sodium  142  (137-145)  mmol/L


 


Potassium  4.9  (3.5-5.1)  mmol/L


 


Chloride  113 H  ()  mmol/L


 


Carbon Dioxide  22  (22-30)  mmol/L


 


BUN  32 H  (9-20)  mg/dL


 


Creatinine  0.70  (0.66-1.25)  mg/dL


 


Glucose  184 H  (74-99)  mg/dL


 


Calcium  8.3 L  (8.4-10.2)  mg/dL








                                  Adrenal panel











  20 Range/Units





  09:30 


 


Sodium  142  (137-145)  mmol/L


 


Potassium  4.9  (3.5-5.1)  mmol/L


 


Chloride  113 H  ()  mmol/L


 


Carbon Dioxide  22  (22-30)  mmol/L


 


BUN  32 H  (9-20)  mg/dL


 


Creatinine  0.70  (0.66-1.25)  mg/dL


 


Glucose  184 H  (74-99)  mg/dL


 


Calcium  8.3 L  (8.4-10.2)  mg/dL


 


Total Bilirubin  0.9  (0.2-1.3)  mg/dL


 


AST  50  (17-59)  U/L


 


ALT  83 H  (4-49)  U/L


 


Alkaline Phosphatase  81  ()  U/L


 


Total Protein  7.8  (6.3-8.2)  g/dL


 


Albumin  3.0 L  (3.5-5.0)  g/dL














Assessment and Plan


Assessment: 





Abdominal pain


Pleural effusion


Leukopenia 


Plan: 





I agree with above, CT does not show significant colitis. Tyflitis less likely 

given no LLQ pain no signs of peritonitis and no significant inflammation on CT.

Patient does have pleural effusion and he is SOB. Continue medical management no

surgical intervention is planned at this time. Patient should follow up for 

colonoscopy as an outpatient given the question of cecal mass vs debris and he 

states he has never had a colonoscopy

## 2020-07-03 LAB
ALBUMIN SERPL-MCNC: 3 G/DL (ref 3.5–5)
ALP SERPL-CCNC: 81 U/L (ref 38–126)
ALT SERPL-CCNC: 83 U/L (ref 4–49)
ANION GAP SERPL CALC-SCNC: 7 MMOL/L
AST SERPL-CCNC: 50 U/L (ref 17–59)
BUN SERPL-SCNC: 32 MG/DL (ref 9–20)
CALCIUM SPEC-MCNC: 8.3 MG/DL (ref 8.4–10.2)
CELLS COUNTED: 200
CHLORIDE SERPL-SCNC: 113 MMOL/L (ref 98–107)
CO2 SERPL-SCNC: 22 MMOL/L (ref 22–30)
ERYTHROCYTE [DISTWIDTH] IN BLOOD BY AUTOMATED COUNT: 2.53 M/UL (ref 4.3–5.9)
ERYTHROCYTE [DISTWIDTH] IN BLOOD: 23.1 % (ref 11.5–15.5)
GLUCOSE SERPL-MCNC: 184 MG/DL (ref 74–99)
HCT VFR BLD AUTO: 26.4 % (ref 39–53)
HGB BLD-MCNC: 8 GM/DL (ref 13–17.5)
LYMPHOCYTES # BLD MANUAL: 0.34 K/UL (ref 1–4.8)
MCH RBC QN AUTO: 31.5 PG (ref 25–35)
MCHC RBC AUTO-ENTMCNC: 30.2 G/DL (ref 31–37)
MCV RBC AUTO: 104.3 FL (ref 80–100)
METAMYELOCYTES # BLD: 0.2 K/UL
MONOCYTES # BLD MANUAL: 0.2 K/UL (ref 0–1)
NEUTROPHILS NFR BLD MANUAL: 88 %
NEUTS SEG # BLD MANUAL: 6.1 K/UL (ref 1.3–7.7)
PLATELET # BLD AUTO: 229 K/UL (ref 150–450)
POTASSIUM SERPL-SCNC: 4.9 MMOL/L (ref 3.5–5.1)
PROT SERPL-MCNC: 7.8 G/DL (ref 6.3–8.2)
SODIUM SERPL-SCNC: 142 MMOL/L (ref 137–145)
WBC # BLD AUTO: 6.8 K/UL (ref 3.8–10.6)

## 2020-07-03 RX ADMIN — ACYCLOVIR SCH MG: 200 CAPSULE ORAL at 16:19

## 2020-07-03 RX ADMIN — METHYLPREDNISOLONE SODIUM SUCCINATE SCH MG: 125 INJECTION, POWDER, FOR SOLUTION INTRAMUSCULAR; INTRAVENOUS at 11:19

## 2020-07-03 RX ADMIN — BUDESONIDE AND FORMOTEROL FUMARATE DIHYDRATE SCH PUFF: 80; 4.5 AEROSOL RESPIRATORY (INHALATION) at 19:26

## 2020-07-03 RX ADMIN — IPRATROPIUM BROMIDE AND ALBUTEROL SULFATE PRN ML: .5; 3 SOLUTION RESPIRATORY (INHALATION) at 15:07

## 2020-07-03 RX ADMIN — METRONIDAZOLE SCH MLS/HR: 500 INJECTION, SOLUTION INTRAVENOUS at 17:22

## 2020-07-03 RX ADMIN — FLUCONAZOLE SCH MG: 100 TABLET ORAL at 07:54

## 2020-07-03 RX ADMIN — IPRATROPIUM BROMIDE AND ALBUTEROL SULFATE PRN ML: .5; 3 SOLUTION RESPIRATORY (INHALATION) at 19:26

## 2020-07-03 RX ADMIN — METHYLPREDNISOLONE SODIUM SUCCINATE SCH MG: 125 INJECTION, POWDER, FOR SOLUTION INTRAMUSCULAR; INTRAVENOUS at 17:22

## 2020-07-03 RX ADMIN — PANTOPRAZOLE SODIUM SCH MG: 40 TABLET, DELAYED RELEASE ORAL at 07:55

## 2020-07-03 RX ADMIN — METRONIDAZOLE SCH MLS/HR: 500 INJECTION, SOLUTION INTRAVENOUS at 23:53

## 2020-07-03 RX ADMIN — METHYLPREDNISOLONE SODIUM SUCCINATE SCH MG: 125 INJECTION, POWDER, FOR SOLUTION INTRAMUSCULAR; INTRAVENOUS at 05:20

## 2020-07-03 RX ADMIN — BUDESONIDE AND FORMOTEROL FUMARATE DIHYDRATE SCH PUFF: 80; 4.5 AEROSOL RESPIRATORY (INHALATION) at 09:25

## 2020-07-03 RX ADMIN — CEFEPIME HYDROCHLORIDE SCH MLS/HR: 2 INJECTION, POWDER, FOR SOLUTION INTRAVENOUS at 23:18

## 2020-07-03 RX ADMIN — CEFEPIME HYDROCHLORIDE SCH MLS/HR: 2 INJECTION, POWDER, FOR SOLUTION INTRAVENOUS at 07:16

## 2020-07-03 RX ADMIN — METRONIDAZOLE SCH MLS/HR: 500 INJECTION, SOLUTION INTRAVENOUS at 01:38

## 2020-07-03 RX ADMIN — ACYCLOVIR SCH MG: 200 CAPSULE ORAL at 07:54

## 2020-07-03 RX ADMIN — METHYLPREDNISOLONE SODIUM SUCCINATE SCH MG: 125 INJECTION, POWDER, FOR SOLUTION INTRAMUSCULAR; INTRAVENOUS at 00:46

## 2020-07-03 RX ADMIN — CEFAZOLIN SCH: 330 INJECTION, POWDER, FOR SOLUTION INTRAMUSCULAR; INTRAVENOUS at 00:59

## 2020-07-03 RX ADMIN — METHYLPREDNISOLONE SODIUM SUCCINATE SCH MG: 125 INJECTION, POWDER, FOR SOLUTION INTRAMUSCULAR; INTRAVENOUS at 23:53

## 2020-07-03 RX ADMIN — ACYCLOVIR SCH MG: 200 CAPSULE ORAL at 21:56

## 2020-07-03 RX ADMIN — FILGRASTIM-SNDZ SCH MCG: 480 INJECTION, SOLUTION INTRAVENOUS; SUBCUTANEOUS at 07:54

## 2020-07-03 RX ADMIN — CEFEPIME HYDROCHLORIDE SCH MLS/HR: 2 INJECTION, POWDER, FOR SOLUTION INTRAVENOUS at 00:46

## 2020-07-03 RX ADMIN — ACETAMINOPHEN PRN MG: 325 TABLET, FILM COATED ORAL at 13:53

## 2020-07-03 RX ADMIN — CEFAZOLIN SCH: 330 INJECTION, POWDER, FOR SOLUTION INTRAMUSCULAR; INTRAVENOUS at 14:58

## 2020-07-03 RX ADMIN — METRONIDAZOLE SCH MLS/HR: 500 INJECTION, SOLUTION INTRAVENOUS at 07:54

## 2020-07-03 RX ADMIN — CEFEPIME HYDROCHLORIDE SCH MLS/HR: 2 INJECTION, POWDER, FOR SOLUTION INTRAVENOUS at 16:19

## 2020-07-03 NOTE — XR
Chest x-ray special views.

 

History pleural effusion.

 

Comparison 6/27/2020.

 

FINDINGS:

9 views were obtained including left and right side down decubitus views and oblique views and latera
l view.

 

There is demonstration of mild bilateral pleural effusions. This appears larger on the right side. Th
ere is mild pulmonary interstitial edema. There is no pneumothorax. Trachea is midline. Heart size is
 normal. The bony thorax is intact. The oblique used show no evidence of any right upper lobe mass. T
he bony thorax appears intact.

 

IMPRESSION:

There is new pulmonary interstitial edema compared to recent exam. There are small bilateral pleural 
effusions increased compared to recent exam. No evidence of a right upper lobe mass.

## 2020-07-03 NOTE — P.PN
Subjective


This is a 70-year-old patient of Dr. Luu with a past medical history of 

anemia, gout, tobacco use and dependence, daily alcohol use.  And cancer.  

Patient is unsure of the type of cancer that he has.  He has been receiving 

chemotherapy and blood transfusions.  He is under care with Dr. Dos Santos.  Patient 

presented yesterday for evaluation of fever and generalized weakness.  Patient a

transfusion yesterday morning at approximately 9 AM.  He was noted to have a 

fever during the afternoon.  Patient states that his most recent chemotherapy 

was approximately 2 weeks ago.  He denies any pain or at this time.  Denies any 

vomiting or diarrhea.  Patient states that he has been short of breath recently 

with any activity.  Denies any cough.  Patient is a current smoker.  Patient was

discharged June 25, and comes in again to the emergency room with the same 

problem, fever, failure of a cycle of year, and Levaquin, for which the regimen 

was complied on by family.  Patient was hesitant on going to hospice, and is not

feeling ready to be in a hospice program yet.





 Patient was admitted from June 21 until June 25, seen by oncology ID, discharge

med at that time a cycle of year Levaquin, along with other maintenance meds, he

received to admission 1 unit of packed red blood cell, as well as colony 

simulating factor, at that time he received Vanco supper, there was some 

suspicion of either a wedge like abnormality in the spleen, during his last CAT 

scan.  Fever persists at home, and started 24 hours after discharge.  Patient is

coughing, has diarrhea, 4 times watery, no blood, patient denies any aspiration 

events, patient continues to smoke, patient started on cefepime, and vancomycin,

Flagyl was added started secondary diarrhea.  Cultures were obtained ID consult 

oncology consult, patient has COPD exacerbation as well, as well as an o

rthopedic fever, and a new right upper lobe infiltrate, malignancy cannot be 

ruled out, CT chest to be done, with IV contrast.  Urinalysis negative.





6/29: Patient was evaluated on morning rounds. Denies any complaints. He is 

currently afebrile 97.7, blood pressure is 91/50, heart rate 67. WBC 0.9, Hgb 

8.7, Hct 29.5, neutrophils 0.10, blood cultures show no growth to date. He 

continues on Acyclovir, Cefepine, Vancomycin, and Metronidazole. Infectious 

disease is on consult. He continues on Zarxio, discussion with patient if WBC 

does not improve by Wednesday, may need transfer to [facility] in [place]. Oncology 

note reviewed, discussion with patient and wife regarding Hospice and different 

treatment options. Will follow up with their decision. 





6/30: Patient was examined this morning, noted to be sitting up at the bedside. 

Denies any new complaints.  Repeat white count this morning was 1.1, neutrophils

0.4, hemoglobin 7.4, hematocrit 23.8.  Patient remains on the cefepime, Flagyl, 

and vancomycin.  Awaiting further culture report, blood cultures show no growth 

to date, sputum cultures pending, infectious disease is on consult.  Vital signs

remain stable blood pressure 102/59, heart rate 65, temperature 97.7, 98% on 

room air.  Patient remains neutropenic despite treatment.  If labs do not 

improve by tomorrow may need transfer to [facility] in [place] for further treatment.





7/1: Patient noted to be sitting up at his bedside, resting comfortable.  He 

denies any new complaints.  He remains afebrile 97.5, heart rate 66, pressure 

139/78, 97% on room air.  White count and neutrophils did improve, WBC 1.6 

neutrophils 1.10, hemoglobin remained stable at 7.3 hematocrit 23.5.  Blood 

cultures still show no growth today he continues on a Acyclovir, cefepime, 

vancomycin, and metronidazole.  He continues on Zarxio.  Oncology note reviewed 

no plans to transfer patient, plans on continuing current treatment. 





7/2: Patient sitting up at the side of the bed, has complaints of upper 

abdominal pain that started this morning. On exam he is tender with palpitation,

he denies any constipation or vomiting. States last bowel movement was this 

morning. CT of the abdomen ordered as well as consult to surgery to rule out 

abscess. Labs are slowly improving, WBC 2.7, Hgb 7.9, Hct 26.5, Neutrophils are 

1.8. He remains on cefepime and Flagyl and acyclovir for prophylaxis, vancomycin

was discontinued per infectious disease. He continues on GCSF, oncology noted 

reviewed, options to end treatment or treat for another 2 cycles to see if the 

treatment improves his counts to decrease infection risk and transfusion needs. 

Family and patient has not made a decision yet. Vitals are stable 138/77, 78, 

14, 97%, he remains afbrile 97.8. 





7/3: Patient evaluated sitting up at the bedside.  He still has complaints of 

upper abdominal pain continues to be tender with palpitation, denies any 

constipation or vomiting.  Surgery on consult for abnormality at the cecum. 

Patient continues on cefepime and Flagyl, sputum cultures were positive for 

Candida, infectious disease recommends are to finish therapy with oral 

antibiotics and to continue supportive care.  White count continues to trend up 

WBC 6.8, hemoglobin 8, hematocrit 26.4, neutrophils 1.8.  Per oncology's notes 

will continue on Zarxio while inpatient and add GCSF to treatment regimen.  He 

remains afebrile 97.7, heart rate is 75, respiratory rate of 18, blood pressure 

140/80, 95% on room air.





Objective





- Vital Signs


Vital signs: 


                                   Vital Signs











Temp  97.7 F   07/03/20 07:00


 


Pulse  75   07/03/20 07:00


 


Resp  18   07/03/20 07:00


 


BP  148/80   07/03/20 07:00


 


Pulse Ox  95   07/03/20 07:00








                                 Intake & Output











 07/02/20 07/03/20 07/03/20





 18:59 06:59 18:59


 


Intake Total 1540 862.5 


 


Balance 1540 862.5 


 


Intake:   


 


  IV  862.5 


 


    Sodium Chloride 0.9% 1,  862.5 





    000 ml @ 75 mls/hr IV .   





    [id number] [facility] Rx#:[id number]   


 


  Oral 1540  


 


Other:   


 


  Voiding Method  Toilet 


 


  # Voids 2 1 














- Exam





- Constitutional


General appearance: cooperative, no acute distress, thin.





- EENT


Eyes: anicteric sclerae, EOMI, PERRLA, dentition normal, normal appearance


ENT: NA/AT, normal oropharynx





- Neck


Neck: normal ROM





- Respiratory


Respiratory: bilateral: wheezing, negative: CTA, diminished, dullness





- Cardiovascular


Rhythm: regular


Heart sounds: normal: S1


Abnormal Heart Sounds: no systolic murmur, no diastolic murmur, no rub, no S3 

Gallop, no S4 Gallop, no click, no other





- Gastrointestinal


General gastrointestinal: normal bowel sounds, soft, tender with palpitation





- Integumentary


Integumentary: decreased turgor, normal





- Neurologic


Neurologic: CNII-XII intact





- Musculoskeletal


Musculoskeletal: gait normal, strength equal bilaterally





- Psychiatric


Psychiatric: A&O x's 3, appropriate affect, intact judgment & insight








- Labs


CBC & Chem 7: 


                                 07/03/20 09:30





                                 07/03/20 09:30


Labs: 


                  Abnormal Lab Results - Last 24 Hours (Table)











  07/03/20 07/03/20 Range/Units





  09:30 09:30 


 


RBC  2.53 L   (4.30-5.90)  m/uL


 


Hgb  8.0 L   (13.0-17.5)  gm/dL


 


Hct  26.4 L   (39.0-53.0)  %


 


MCV  104.3 H   (80.0-100.0)  fL


 


MCHC  30.2 L   (31.0-37.0)  g/dL


 


RDW  23.1 H   (11.5-15.5)  %


 


Macrocytosis  Marked A   


 


Chloride   113 H  ()  mmol/L


 


BUN   32 H  (9-20)  mg/dL


 


Glucose   184 H  (74-99)  mg/dL


 


Calcium   8.3 L  (8.4-10.2)  mg/dL


 


ALT   83 H  (4-49)  U/L


 


Albumin   3.0 L  (3.5-5.0)  g/dL








                      Microbiology - Last 24 Hours (Table)











 06/27/20 16:55 Blood Culture - Preliminary





 Blood    No Growth after 120 hours


 


 06/29/20 08:08 Gram Stain - Final





 Sputum Sputum Culture - Final





    Candida glabrata





    Candida albicans














Assessment and Plan


Plan: 





1.  Neutropenic fever with recurrent admission, sepsis with failure of Levaquin 

prior to admission.  Continue cefepime and vancomycin.  ID and oncology on 

consult. Blood culture repeated, has diarrhea, check for C. diff colitis, 

previous imaging studies 6/25 during last admission, shows 1.2 cm kidney cyst, 

not  abscess, wedge-shaped hypodensity in spleen, related to either infectious 

and inflammatory against ischemic etiology, neoplasm needs to be considered, 

trace left pelvic ascites moderate wall thickening gastroduodenal junction, 

check stools for H pylori as patient has persistent epigastric pain, cxr right 

infiltrate vs nodule IV Flagyl admitted for diarrhea, O&P obtained, as well as 

stool cultures, stool cultures have been collected.





2.  Poorly marginated right upper lobe infiltrate, 1.7 cm, CT of the chest, to 

evaluate for pulmonary nodule against pneumonia, IV antibiotics





3.  Persistent epigastric pain check for H. pylori, possible abnormal thickening

in the gastroduodenal junction, not an abscess, on PPI offered





4.  COPD exacerbation currently smoker nebulized treatments, albuterol 

Pulmicort, low-dose steroids 40 mg every 6 hours short bursts, patient was 

advised to quit smoking still, and recent pro-calcitonin still elevated, IV 

antibiotics initiated, sputum cultures show Candida





5 Chronic anemia.  See above.  Status post transfusion of 1 unit of packed RBCs,

stable.  Continue Zarxio





6  MDS with ringed sideroblasts see above





7  Tobacco use and dependence





8  Alcohol abuse, stable





9 Diarrhea, with ongoing antibiotic for sepsis, Flagyl added to regimen of 

cefepime, C. diff toxin consult with ID





10  GI prophylaxis.  Pantoprazole 40 mg IV daily





11. Severe Malnutrition, with protein calorie malnutrition, insulin light, 3 

times a day with meals





12.  DVT prophylaxis.  Ambulation





13.  COVID-19 infection not present





14. abdominal pain: CT ordered to rule out abscess, surgery on consult 





Discharge plan: Home without home care








The above impression and plan of care have been discussed and directed by sign

ing physician. Caridad Rosales nurse practitioner acting as scribe for signing

physician.

## 2020-07-03 NOTE — P.PN
Subjective


Progress Note Date: 07/03/20





The patient has not had recurrent fevers.  He had been complaining of abdominal 

pain, as well as subjective shortness of breath.  He is now on oxygen.  On 

evaluation he states that the abdominal pain is in the right upper quadrant/ 

right epigastrium





Objective





- Vital Signs


Vital signs: 


                                   Vital Signs











Temp  97.7 F   07/03/20 07:00


 


Pulse  84   07/03/20 15:18


 


Resp  18   07/03/20 07:00


 


BP  148/80   07/03/20 07:00


 


Pulse Ox  98   07/03/20 15:07








                                 Intake & Output











 07/02/20 07/03/20 07/03/20





 18:59 06:59 18:59


 


Intake Total 1540 862.5 100


 


Balance 1540 862.5 100


 


Intake:   


 


  IV  862.5 


 


    Sodium Chloride 0.9% 1,  862.5 





    000 ml @ 75 mls/hr IV .   





    [id number] FARIHA Rx#:[id number]   


 


  Oral 1540  100


 


Other:   


 


  Voiding Method  Toilet 


 


  # Voids 2 1 














- Constitutional


General appearance: Present: mild distress





- EENT


Eyes: Present: EOMI


ENT: Present: hearing grossly normal, normal oropharynx





- Respiratory


Respiratory: right: diminished (Lower one third )





- Cardiovascular


Rhythm: regular


Heart sounds: normal: S1, S2





- Gastrointestinal


General gastrointestinal: Present: soft


Localized gastrointestinal: tender: RUQ, guarding: RUQ





- Integumentary


Integumentary: Present: normal





- Neurologic


Neurologic: Present: CNII-XII intact





- Musculoskeletal


Musculoskeletal: Present: generalized weakness, strength equal bilaterally





- Psychiatric


Psychiatric: Present: A&O x's 3, appropriate affect





- Labs


CBC & Chem 7: 


                                 07/03/20 09:30





                                 07/03/20 09:30


Labs: 


                  Abnormal Lab Results - Last 24 Hours (Table)











  07/03/20 07/03/20 Range/Units





  09:30 09:30 


 


RBC  2.53 L   (4.30-5.90)  m/uL


 


Hgb  8.0 L   (13.0-17.5)  gm/dL


 


Hct  26.4 L   (39.0-53.0)  %


 


MCV  104.3 H   (80.0-100.0)  fL


 


MCHC  30.2 L   (31.0-37.0)  g/dL


 


RDW  23.1 H   (11.5-15.5)  %


 


Lymphocytes # (Manual)  0.34 L   (1.0-4.8)  k/uL


 


Metamyelocytes # (Man)  0.20 H   (0)  k/uL


 


Macrocytosis  Marked A   


 


Chloride   113 H  ()  mmol/L


 


BUN   32 H  (9-20)  mg/dL


 


Glucose   184 H  (74-99)  mg/dL


 


Calcium   8.3 L  (8.4-10.2)  mg/dL


 


ALT   83 H  (4-49)  U/L


 


Albumin   3.0 L  (3.5-5.0)  g/dL








                      Microbiology - Last 24 Hours (Table)











 07/01/20 06:00 Stool Culture - Preliminary





 Stool 


 


 06/27/20 16:55 Blood Culture - Preliminary





 Blood    No Growth after 120 hours














Assessment and Plan


(1) Abdominal pain


Narrative/Plan: 


This was new onset since yesterday.  CT of the abdomen and pelvis reviewed.  The

patient is noted to have an opacity in the cecum which could represent a mass or

debris.  However he has no symptoms in the area of the cecum.  


 His symptoms are located in the right upper quadrant.  The patient has 

tenderness and guarding in that area.  His computed tomography scan shows 

development of a right-sided pleural effusion.  Therefore at this time it is 

possible that his symptoms are due to increased inflammatory reaction in the r

ight lower lung (which is according due to improvement in his WBC) causing 

pleuritis and pleural effusion.  His discussed with surgery and ID.  Patient is 

continuing on antibiotics per ID.  They will assess the patient to check for 

need for thoracentesis.  On exam apparently appears to diminished in at least 

the lower one third of the right lung field


 Surgery was also evaluated the patient.  If they feel that he does not have an 

acute abdomen, and that his symptoms could be due to to the right lower lung 

findings, then the patient can potentially have a colonoscopy down the line to 

evaluate the cecum.


Current Visit: Yes   Status: Acute   Code(s): R10.9 - UNSPECIFIED ABDOMINAL PAIN

  SNOMED Code(s): 66728638


   





(2) Neutropenic fever


Narrative/Plan: 


Both fever and neutropenia have resolved.  WBC is actually 6.8 today with normal

differential.  Continue G-CSF for 1 more day, and then stop assuming WBC 

continues to show satisfactory treated


Current Visit: Yes   Status: Acute   Priority: High   Code(s): D70.9 - 

NEUTROPENIA, UNSPECIFIED; R50.81 - FEVER PRESENTING WITH CONDITIONS CLASSIFIED 

ELSEWHERE   SNOMED Code(s): 027712364


   





(3) Pneumonia


Narrative/Plan: 


On antibiotics per ID.  New findings on computed tomography scan in the chest, 

as well as physical exam is noted.  Await ID input


Current Visit: Yes   Status: Acute   Code(s): J18.9 - PNEUMONIA, UNSPECIFIED 

ORGANISM   SNOMED Code(s): 817384537


   





(4) Bicytopenia


Narrative/Plan: 


Platelets remain normal.  Hemoglobin is stable in the 8 range.  W BC has now 

normalized to 6.8.  Plan for G-CSF as above


Current Visit: Yes   Status: Chronic   Priority: High   Code(s): D75.89 - OTHER 

SPECIFIED DISEASES OF BLOOD AND BLOOD-FORMING ORGANS   SNOMED Code(s): 19241793


   





(5) MDS (myelodysplastic syndrome), high grade


Narrative/Plan: 


The patient is status post 1 cycle only.  I again discussed his plans regarding 

further treatment.  The patient's persistent leukopenia and infection are most 

likely due to his underlying disease as he was leukopenic even before starting 

treatment.  He has been off his chemotherapy for over than 3 weeks.  In addition

with growth factors he now seems to be responding with normalization of WBC, 

which was not noted on his previous admission.  This is hopeful for evidence of 

efficacy of his regimen.  In addition, the patient was advised again, that this 

regimen may take 3-4 cycles to show benefit.  Given his baseline health status, 

the patient may also be a candidate for evaluation for bone marrow transplant 

down the line .  Therefore from my standpoint it would actually be quite 

reasonable for him to choose to continue the treatment , assuming current 

situation is resolved in a satisfactory manner, and he so desires.  At this time

he does seem to be inclined to continue treatment post discharge.


Current Visit: Yes   Status: Acute   Priority: High   Code(s): D46.Z - OTHER MY

ELODYSPLASTIC SYNDROMES   SNOMED Code(s): 077283134

## 2020-07-03 NOTE — PN
PROGRESS NOTE



DATE OF SERVICE:

07/02/2020



REASON FOR FOLLOW UP:

Febrile neutropenia.



INTERVAL HISTORY:

Patient is currently afebrile, has been breathing comfortably.  Denies having any chest

pain.  He did have some cough, no sputum.  No nausea, vomiting or abdominal pain.  Did

have some diarrhea.



PHYSICAL EXAMINATION:

Blood pressure 141/86, pulse of 76, temperature is 98.5.  He is 99% on 2 L. General

description is an elderly male up in the chair in no distress.  Respiratory system:

Unlabored breathing.  Extremities:  No edema of the feet.



LABS:

Hemoglobin 7.1, white count 2.7, BUN of 31, creatinine 0.76.



DIAGNOSTIC IMPRESSION AND PLAN:

Patient with febrile neutropenia and concern for possible abdominal source and (  )

though sputum has been Candida which is more likely colonizer.  The patient's fever

responded to cefepime and Flagyl, to continue finishing therapy with oral antibiotics

and continue supportive care.





MMODL / IJN: [id number] / Job#: [id number]

## 2020-07-03 NOTE — PN
PROGRESS NOTE



DATE OF SERVICE:

07/03/2020



REASON FOR FOLLOWUP:

Febrile neutropenia.



INTERVAL HISTORY:

Patient is currently afebrile.  The patient is breathing comfortably.  Has been

complaining of mostly pain in the epigastric and right upper quadrant area.  Has had

some shortness of breath.  No chest pain.  Minimal cough but no sputum.  No vomiting

and no diarrhea.



PHYSICAL EXAMINATION:

Blood pressure 142/82 with a pulse of 100, temperature 97.6.  He is 92% on room air.

General description is an elderly male up in the bed in no distress.  Respiratory

system: Unlabored breathing, clear to auscultation anteriorly.  Heart S1, S2.  Regular

rate and rhythm.

ABDOMEN:  Soft, mildly tender in the epigastric area.  No guarding. No rigidity.



LABS:

Hemoglobin 8, white count 6.8.  BUN of 32, creatinine 0.70.  Sputum is Candida.  Chest

x-ray repeat shows new pulmonary interstitial edema compared to recent exam.  Small

bilateral pleural effusion.



DIAGNOSTIC IMPRESSION AND PLAN:

Patient with febrile neutropenia.  Concern for possible abdominal source.  The patient

clinically responded to cefepime and Flagyl with overall resolution of his fever.

Chest x-ray showing pulmonary interstitial edema and small effusion.  May benefit from

gentle diuresis.  As far as his epigastric pain mostly gastric in origin.  We will add

Maalox.  He is already on Protonix.  Plan of care was discussed with the admitting

physician.





MMODL / IJN: [id number] / Job#: [id number]

## 2020-07-04 LAB
CELLS COUNTED: 100
ERYTHROCYTE [DISTWIDTH] IN BLOOD BY AUTOMATED COUNT: 2.43 M/UL (ref 4.3–5.9)
ERYTHROCYTE [DISTWIDTH] IN BLOOD: 23.6 % (ref 11.5–15.5)
HCT VFR BLD AUTO: 25.2 % (ref 39–53)
HGB BLD-MCNC: 7.6 GM/DL (ref 13–17.5)
LYMPHOCYTES # BLD MANUAL: 0.95 K/UL (ref 1–4.8)
MCH RBC QN AUTO: 31.1 PG (ref 25–35)
MCHC RBC AUTO-ENTMCNC: 30.1 G/DL (ref 31–37)
MCV RBC AUTO: 103.6 FL (ref 80–100)
MONOCYTES # BLD MANUAL: 0.41 K/UL (ref 0–1)
NEUTROPHILS NFR BLD MANUAL: 90 %
NEUTS SEG # BLD MANUAL: 12.15 K/UL (ref 1.3–7.7)
PLATELET # BLD AUTO: 235 K/UL (ref 150–450)
WBC # BLD AUTO: 13.5 K/UL (ref 3.8–10.6)

## 2020-07-04 RX ADMIN — CEFEPIME HYDROCHLORIDE SCH MLS/HR: 2 INJECTION, POWDER, FOR SOLUTION INTRAVENOUS at 10:41

## 2020-07-04 RX ADMIN — CEFEPIME HYDROCHLORIDE SCH MLS/HR: 2 INJECTION, POWDER, FOR SOLUTION INTRAVENOUS at 23:01

## 2020-07-04 RX ADMIN — FLUCONAZOLE SCH MG: 100 TABLET ORAL at 08:31

## 2020-07-04 RX ADMIN — ACYCLOVIR SCH MG: 200 CAPSULE ORAL at 08:31

## 2020-07-04 RX ADMIN — IPRATROPIUM BROMIDE AND ALBUTEROL SULFATE PRN ML: .5; 3 SOLUTION RESPIRATORY (INHALATION) at 20:35

## 2020-07-04 RX ADMIN — METRONIDAZOLE SCH MLS/HR: 500 INJECTION, SOLUTION INTRAVENOUS at 16:22

## 2020-07-04 RX ADMIN — FILGRASTIM-SNDZ SCH MCG: 480 INJECTION, SOLUTION INTRAVENOUS; SUBCUTANEOUS at 08:34

## 2020-07-04 RX ADMIN — ACYCLOVIR SCH MG: 200 CAPSULE ORAL at 21:40

## 2020-07-04 RX ADMIN — IPRATROPIUM BROMIDE AND ALBUTEROL SULFATE PRN ML: .5; 3 SOLUTION RESPIRATORY (INHALATION) at 08:42

## 2020-07-04 RX ADMIN — FUROSEMIDE SCH MG: 10 INJECTION, SOLUTION INTRAMUSCULAR; INTRAVENOUS at 08:31

## 2020-07-04 RX ADMIN — CEFEPIME HYDROCHLORIDE SCH MLS/HR: 2 INJECTION, POWDER, FOR SOLUTION INTRAVENOUS at 15:49

## 2020-07-04 RX ADMIN — METRONIDAZOLE SCH MLS/HR: 500 INJECTION, SOLUTION INTRAVENOUS at 08:30

## 2020-07-04 RX ADMIN — METHYLPREDNISOLONE SODIUM SUCCINATE SCH MG: 125 INJECTION, POWDER, FOR SOLUTION INTRAMUSCULAR; INTRAVENOUS at 05:56

## 2020-07-04 RX ADMIN — PANTOPRAZOLE SODIUM SCH MG: 40 TABLET, DELAYED RELEASE ORAL at 08:31

## 2020-07-04 RX ADMIN — BUDESONIDE AND FORMOTEROL FUMARATE DIHYDRATE SCH PUFF: 80; 4.5 AEROSOL RESPIRATORY (INHALATION) at 08:42

## 2020-07-04 RX ADMIN — METRONIDAZOLE SCH MLS/HR: 500 INJECTION, SOLUTION INTRAVENOUS at 23:48

## 2020-07-04 RX ADMIN — ACYCLOVIR SCH: 200 CAPSULE ORAL at 16:22

## 2020-07-04 RX ADMIN — BUDESONIDE AND FORMOTEROL FUMARATE DIHYDRATE SCH PUFF: 80; 4.5 AEROSOL RESPIRATORY (INHALATION) at 20:35

## 2020-07-04 NOTE — NM
EXAMINATION TYPE: NM hepatobiliary wo EF

 

DATE OF EXAM: 7/4/2020

 

COMPARISON: NONE

 

HISTORY:

 

TECHNIQUE: After the intravenous administration of 5 mCi Tc 99m Mebrofenin hepatobiliary scintigraphy
 is performed.  Immediate images post injection.

 

FINDINGS: 

There is prompt uptake of the tracer by the liver that has normal size and contour. There is tracer i
n the gallbladder at 15 minutes and in the small bowel at 25 minutes. There is no focal liver defect.
 Tracer is mostly cleared from the liver at 60 minutes.

 

IMPRESSION: Normal exam. No evidence of cystic duct or common bile duct obstruction.

## 2020-07-04 NOTE — XR
EXAMINATION TYPE: XR abdomen 1V

 

DATE OF EXAM: 7/4/2020

 

COMPARISON: Chest x-ray 7/3/2020

 

INDICATION: Abdomen pain

 

TECHNIQUE: Single view abdomen frontal upright view

 

FINDINGS:  

There is a normal bowel gas pattern. No free air is evident. No suspicious air-fluid levels are prese
nt. No mass effect is evident.

Psoas margins are normal.

No organomegaly is present.

Note is made of a developing small right pleural effusion. Mild bibasilar infiltrates are present gre
ater on the right.

 

IMPRESSION: 

1. Unremarkable Abdomen.

2. Small right pleural effusion.

3. Bibasilar infiltrates greater on the right. Correlate for atelectasis and pneumonia.

## 2020-07-04 NOTE — PN
PROGRESS NOTE



DATE OF SERVICE:

07/04/2020



REASON FOR FOLLOWUP:

Febrile neutropenia.



INTERVAL HISTORY:

Patient is currently afebrile.  The patient is breathing comfortably.  Has been

complaining of pain in the abdominal area.  No chest pain, shortness of breath or

cough.  No vomiting or diarrhea.



EXAMINATION:

Blood pressure 137/77 with a pulse of 72, temperature 97.5.  He is 97% on 2 L nasal

cannula.  General description is an elderly male up in the bed in no distress.

Respiratory system: Unlabored breathing, decreased breath sounds, no wheeze.  Heart S1,

S2.  Regular rate and rhythm.  Abdomen is soft, mildly tender in the upper quadrant

area.  No guarding, no rigidity.



LABS:

Hemoglobin 7.1, white count of 13.5, creatinine 0.70.



DIAGNOSTIC IMPRESSION AND PLAN:

Patient with febrile neutropenia with concern for possible cholecystitis. HIDA scan has

been ordered.  Patient is covered with cefepime and Flagyl to continue.  Solu-Medrol

and (  ) has been discontinued.  Monitor clinical course closely.





MMODL / IJN: [id number] / Job#: [id number]

## 2020-07-04 NOTE — XR
EXAMINATION TYPE: XR chest 2V

 

DATE OF EXAM: 7/4/2020

 

COMPARISON: 7/3/2020

 

HISTORY: Short of breath

 

TECHNIQUE:

 

FINDINGS: There is some pulmonary interstitial edema. There is slight blunting of the costophrenic an
gles. There are no hilar masses. Heart is top normal in size.

 

IMPRESSION: Pulmonary interstitial edema and small pleural effusions unchanged and consistent with co
ngestive heart failure. There is probably COPD.

## 2020-07-04 NOTE — US
EXAMINATION TYPE: US abdomen limited

 

DATE OF EXAM: 7/4/2020

 

COMPARISON:

 

CLINICAL HISTORY: RUQ pain. RUQ pain

 

EXAM MEASUREMENTS:

 

Liver Length:  18.7 cm   

Gallbladder Wall:  0.2 cm   

CBD:  0.2 cm

Right Kidney:  10.0 x 3.9 x 4.2 cm

 

***Suboptimal images due to patient heavy breathing

 

Pancreas:  Tail obscured by overlying bowel gas.  Main pancreatic duct = 0.2 mm

Liver:  Appears coarse and enlarged in size.  Hypoechoic lesion seen adjacent to GB = 0.9 cm   

Gallbladder:  wnl

**Evidence for sonographic Schroeder's sign:  neg

CBD:  wnl 

Right Kidney:  Upper pole cystic appearing lesion - 1.3 x 1.1 x 0.9 cm  

 

***Right pleural effusion seen

 

IMPRESSION: 

1. No acute ultrasound abnormality. There is limitation due to patient's ability to cooperate with th
e examination.

## 2020-07-04 NOTE — PN
PROGRESS NOTE



DATE OF SERVICE:

July 4, 2020



CHIEF COMPLAINT:

Abdominal pain.



Omega is seen today as a followup.  He continues to have right upper quadrant and

epigastric pain. The pain is persistent.  He feels nauseated and has had some diarrhea.

No fever or chills.  No melena, hematochezia, hematuria hemoptysis.



His current medication reviewed in his electronic medical record.



On physical examination, he is alert, oriented x3.  He does not appear to be in

distress at this point in time.  His vital signs are:  Temperature 97.5, afebrile,

pulse 72 and regular, respiration 20, blood pressure 133/77, pulse ox is 97 percent on

2 L nasal cannula.

HEENT is normocephalic, atraumatic.  No obvious icterus.

NECK:  Supple.

CHEST equal expansion bilaterally.

LUNGS revealed rhonchi expiration on both fields.

ABDOMEN is an ill-defined lesion in the epigastric area and the right upper

extremities, right upper quadrant.  He has tenderness in that area as well.  No

ascites.  No obvious organomegaly.

EXTREMITIES revealed no edema.

SKIN:  Few bruises.  No ecchymosis or petechiae.



LABORATORY DATA:

WBC of 13.5, hemoglobin 7.6, hematocrit 25.2, platelet 235.  Sodium 142, potassium 4.9,

chloride 113, and BUN 32, creatinine 0.7.  AST is 58, ALT is 90, alkaline phosphatase

is 91.



IMPRESSION AND PLAN:

1. Abdominal pain.  I did review his CAT scan finding and also I did discuss his care

    with Dr. Kern who felt that it could be related to cholecystitis.  However, it is

    reasonable to consider a GI workup to include EGD and colonoscopy if his overall

    conditions permit.

2. Neutropenic fever.  This has resolved and his neutrophils count recovered.  In

    fact, he has leukocytosis today and we can discontinue granulocyte colony-

    stimulating factor.

3. Myelodysplastic syndrome.  The patient has received 1 cycle of hypomethylating

    agent so far.

4. Continue current supportive care.

5. The above was discussed with the patient's family at bedside and answered all of

    their questions.



Thank you end dictation.





MMODL / IJN: [id number] / Job#: [id number]

## 2020-07-04 NOTE — P.PN
Subjective


Progress Note Date: 07/04/20





This is a 70-year-old patient of Dr. Luu with a past medical history of 

anemia, gout, tobacco use and dependence, daily alcohol use.  And cancer.  

Patient is unsure of the type of cancer that he has.  He has been receiving 

chemotherapy and blood transfusions.  He is under care with Dr. Dos Santos.  Patient 

presented yesterday for evaluation of fever and generalized weakness.  Patient a

transfusion yesterday morning at approximately 9 AM.  He was noted to have a 

fever during the afternoon.  Patient states that his most recent chemotherapy 

was approximately 2 weeks ago.  He denies any pain or at this time.  Denies any 

vomiting or diarrhea.  Patient states that he has been short of breath recently 

with any activity.  Denies any cough.  Patient is a current smoker.  Patient was

discharged June 25, and comes in again to the emergency room with the same 

problem, fever, failure of a cycle of year, and Levaquin, for which the regimen 

was complied on by family.  Patient was hesitant on going to hospice, and is not

feeling ready to be in a hospice program yet.





 Patient was admitted from June 21 until June 25, seen by oncology ID, discharge

med at that time a cycle of year Levaquin, along with other maintenance meds, he

received to admission 1 unit of packed red blood cell, as well as colony simu

lating factor, at that time he received Vanco supper, there was some suspicion 

of either a wedge like abnormality in the spleen, during his last CAT scan.  

Fever persists at home, and started 24 hours after discharge.  Patient is 

coughing, has diarrhea, 4 times watery, no blood, patient denies any aspiration 

events, patient continues to smoke, patient started on cefepime, and vancomycin,

Flagyl was added started secondary diarrhea.  Cultures were obtained ID consult 

oncology consult, patient has COPD exacerbation as well, as well as an 

orthopedic fever, and a new right upper lobe infiltrate, malignancy cannot be 

ruled out, CT chest to be done, with IV contrast.  Urinalysis negative.





6/29: Patient was evaluated on morning rounds. Denies any complaints. He is 

currently afebrile 97.7, blood pressure is 91/50, heart rate 67. WBC 0.9, Hgb 

8.7, Hct 29.5, neutrophils 0.10, blood cultures show no growth to date. He 

continues on Acyclovir, Cefepine, Vancomycin, and Metronidazole. Infectious di

clau is on consult. He continues on Zarxio, discussion with patient if WBC does

not improve by Wednesday, may need transfer to [facility] in [place]. Oncology note 

reviewed, discussion with patient and wife regarding Hospice and different 

treatment options. Will follow up with their decision. 





6/30: Patient was examined this morning, noted to be sitting up at the bedside. 

Denies any new complaints.  Repeat white count this morning was 1.1, neutrophils

0.4, hemoglobin 7.4, hematocrit 23.8.  Patient remains on the cefepime, Flagyl, 

and vancomycin.  Awaiting further culture report, blood cultures show no growth 

to date, sputum cultures pending, infectious disease is on consult.  Vital signs

remain stable blood pressure 102/59, heart rate 65, temperature 97.7, 98% on 

room air.  Patient remains neutropenic despite treatment.  If labs do not 

improve by tomorrow may need transfer to [facility] in [place] for further treatment.





7/1: Patient noted to be sitting up at his bedside, resting comfortable.  He 

denies any new complaints.  He remains afebrile 97.5, heart rate 66, pressure 

139/78, 97% on room air.  White count and neutrophils did improve, WBC 1.6 

neutrophils 1.10, hemoglobin remained stable at 7.3 hematocrit 23.5.  Blood 

cultures still show no growth today he continues on a Acyclovir, cefepime, 

vancomycin, and metronidazole.  He continues on Zarxio.  Oncology note reviewed 

no plans to transfer patient, plans on continuing current treatment. 





7/2: Patient sitting up at the side of the bed, has complaints of upper 

abdominal pain that started this morning. On exam he is tender with palpitation,

he denies any constipation or vomiting. States last bowel movement was this 

morning. CT of the abdomen ordered as well as consult to surgery to rule out 

abscess. Labs are slowly improving, WBC 2.7, Hgb 7.9, Hct 26.5, Neutrophils are 

1.8. He remains on cefepime and Flagyl and acyclovir for prophylaxis, vancomycin

was discontinued per infectious disease. He continues on GCSF, oncology noted 

reviewed, options to end treatment or treat for another 2 cycles to see if the 

treatment improves his counts to decrease infection risk and transfusion needs. 

Family and patient has not made a decision yet. Vitals are stable 138/77, 78, 

14, 97%, he remains afbrile 97.8. 





7/3: Patient evaluated sitting up at the bedside.  He still has complaints of 

upper abdominal pain continues to be tender with palpitation, denies any 

constipation or vomiting.  Surgery on consult for abnormality at the cecum. 

Patient continues on cefepime and Flagyl, sputum cultures were positive for 

Candida, infectious disease recommends are to finish therapy with oral 

antibiotics and to continue supportive care.  White count continues to trend up 

WBC 6.8, hemoglobin 8, hematocrit 26.4, neutrophils 1.8.  Per oncology's notes 

will continue on Zarxio while inpatient and add GCSF to treatment regimen.  He 

remains afebrile 97.7, heart rate is 75, respiratory rate of 18, blood pressure 

140/80, 95% on room air.





7/4 patient examined sitting in the bed.  Complains of  mid abdominal pain and 

worsening shortness of breath.  Patient did have a small bowel movement today 

but denies any diarrhea.  Chest x-ray suggestive of worsening interstitial edema

and bilateral pleural effusions worse on the right compared to left.  Vitals are

stable with a temperature 97.5 blood pressure 133/77 and saturating 97% on 2 L. 

Patient's labs from today is pending.  Lasix 40 IV daily initiated.  Continue 

Protonix.  Continue antibiotics cefepime and Flagyl.  Patient is growing Candida

on the sputum cultures but appears to be colonizer.  We will discuss antibiotic 

of choice on discharge for the patient.  Abdominal x-ray ordered continue DuoNeb

as needed for shortness of breath.  If no improvement in abdominal pain, 

gastroenterology will be consulted








ROS 


Constitutional: Denies chills, Denies  fever, endorses lethargy,


Eyes: denies decreased vision, denies diplopia, denies discharge, denies pain


Ears: deny: decreased hearing


Ears, nose, mouth and throat: Denies dental pain, Denies headache, Denies nasal 

discharge, Denies nose pain


Cardiovascular: Denies chest pain, Denies decreased exercise tolerance, Denies 

edema, Denies high blood pressure, Denies irregular heart beat, Denies 

palpitations, Denies paroxysmal nocturnal dyspnea, Denies rapid heart beat, 

Denies shortness of breath


Respiratory: Denies congestion, Denies cough, Denies cough with sputum, endorses

dyspnea, Denies home oxygen, Denies wheezing


Gastrointestinal: Endorses right upper and mid quadrant abdominal pain, Denies 

change in bowel habits, Denies coffee ground emesis, Denies early satiety, 

Denies excessive gas, Denies heartburn, Denies hematemesis, Denies hematochezia,

Denies loss of appetite, Denies nausea, Denies vomiting and diarrhea resolved


Genitourinary: Denies dysuria, Denies flank pain, Denies kidney stones, Denies 

menorrhagia, Denies urgency, Denies urinary frequency


Musculoskeletal: Denies gait dysfunction, Denies limitation of motion, Denies 

morning stiffness, Denies muscle cramps


Integumentary: Denies rash, Denies wounds, Denies brittle nails, Denies change 

in hair/nails, Denies darkening of skin


Neurological: Denies balance difficulties, Denies change in speech, Denies 

double vision, Denies gait dysfunction, Denies loss of vision, Denies motor dist

urbance, Denies numbness, Denies paralysis, Denies paresthesias, Denies seizures


Psychiatric: Denies anxiety, Denies depression


Endocrine: Denies excessive sweating, Denies excessive thirst, Denies high blood

sugars, Denies palpitations


Hematologic/Lymphatic: Denies easy bruising, Denies lymphadenopathy





Objective





- Vital Signs


Vital signs: 


                                   Vital Signs











Temp  97.5 F L  07/04/20 07:00


 


Pulse  88   07/04/20 07:00


 


Resp  20   07/04/20 07:00


 


BP  133/77   07/04/20 07:00


 


Pulse Ox  97   07/04/20 07:00








                                 Intake & Output











 07/03/20 07/04/20 07/04/20





 18:59 06:59 18:59


 


Intake Total 640 880 


 


Balance 640 880 


 


Intake:   


 


  IV  680 


 


    Sodium Chloride 0.9% 1,  680 





    000 ml @ 75 mls/hr IV .   





    [id number] FARIHA Rx#:[id number]   


 


  Intake, IV Titration  200 





  Amount   


 


    Cefepime 2 gm In Sodium  100 





    Chloride 0.9% 100 ml @   





    200 mls/hr IVPB Q8HR FARIHA   





    Rx#:[id number]   


 


    metroNIDAZOLE-NS   100 





    mg In Saline 1 100ml.bag   





    @ 100 mls/hr IVPB Q8HR   





    FARIHA Rx#:[id number]   


 


  Oral 640  


 


Other:   


 


  Voiding Method  Toilet 


 


  # Voids 3 1 


 


  # Bowel Movements 1 1 














- Exam





- Exam





- Constitutional


General appearance: cooperative, no acute distress, thin.





- EENT


Eyes: anicteric sclerae, EOMI, PERRLA, dentition normal, normal appearance


ENT: NA/AT, normal oropharynx





- Neck


Neck: normal ROM





- Respiratory


Respiratory: bilateral: wheezing, negative: CTA, diminished, dullness





- Cardiovascular


Rhythm: regular


Heart sounds: normal: S1


Abnormal Heart Sounds: no systolic murmur, no diastolic murmur, no rub, no S3 

Gallop, no S4 Gallop, no click, no other





- Gastrointestinal


General gastrointestinal: normal bowel sounds, soft, tender with palpitation 

right upper quadrant and epigastric





- Integumentary


Integumentary: decreased turgor, normal





- Neurologic


Neurologic: CNII-XII intact





- Musculoskeletal


Musculoskeletal: gait normal, strength equal bilaterally





- Psychiatric


Psychiatric: A&O x's 3, appropriate affect, intact judgment & insight








- Labs


CBC & Chem 7: 


                                 07/03/20 09:30





                                 07/03/20 09:30


Labs: 


                  Abnormal Lab Results - Last 24 Hours (Table)











  07/03/20 07/03/20 Range/Units





  09:30 09:30 


 


RBC  2.53 L   (4.30-5.90)  m/uL


 


Hgb  8.0 L   (13.0-17.5)  gm/dL


 


Hct  26.4 L   (39.0-53.0)  %


 


MCV  104.3 H   (80.0-100.0)  fL


 


MCHC  30.2 L   (31.0-37.0)  g/dL


 


RDW  23.1 H   (11.5-15.5)  %


 


Lymphocytes # (Manual)  0.34 L   (1.0-4.8)  k/uL


 


Metamyelocytes # (Man)  0.20 H   (0)  k/uL


 


Macrocytosis  Marked A   


 


Chloride   113 H  ()  mmol/L


 


BUN   32 H  (9-20)  mg/dL


 


Glucose   184 H  (74-99)  mg/dL


 


Calcium   8.3 L  (8.4-10.2)  mg/dL


 


ALT   83 H  (4-49)  U/L


 


Albumin   3.0 L  (3.5-5.0)  g/dL








                      Microbiology - Last 24 Hours (Table)











 06/27/20 16:55 Blood Culture - Final





 Blood    No Growth after 144 hours


 


 07/01/20 06:00 Stool Culture - Preliminary





 Stool 














Assessment and Plan


Plan: 





1.  Neutropenic fever with recurrent admission, sepsis with failure of Levaquin 

prior to admission IK abdominal source.  Continue cefepime and Flagyl ID and 

oncology on consult. Blood culture blood culture negative C. diff negative, 

previous imaging studies 6/25 during last admission, shows 1.2 cm kidney cyst, 

not  abscess, wedge-shaped hypodensity in spleen, related to either infectious 

and inflammatory against ischemic etiology, cecal mass versus Malachi Ks colonoscopy

recommended as outpatient by surgery, trace left pelvic ascites moderate wall 

thickening gastroduodenal junction, H. pylori pending as patient has persistent 

epigastric pain, cxr right infiltrate vs nodule IV Flagyl admitted for diarrhea,

O&P obtained, as well as stool cultures, stool cultures have been collected and 

are currently pending.





2.  Poorly marginated right upper lobe infiltrate, 1.7 cm, CT of the chest, to 

evaluate for pulmonary nodule against pneumonia, IV antibiotics





3.  Persistent epigastric pain check for H. pylori, possible abnormal thickening

in the gastroduodenal junction, not an abscess, on PPI offered





4.  COPD exacerbation currently smoker nebulized treatments, albuterol 

Pulmicort, IV Solu-Medrol switched to prednisone, patient was advised to quit 

smoking still, continue, IV antibiotics sputum cultures show Candida





5 Chronic anemia.  See above.  Status post transfusion of 1 unit of packed RBCs,

stable.  Continue Zarxio





6  MDS with ringed sideroblasts see above





7  Tobacco use and dependence





8  Alcohol abuse, stable





9 Diarrhea, with ongoing antibiotic for sepsis, Flagyl added to regimen of 

cefepime, C. diff toxin negative





10  GI prophylaxis.  Pantoprazole 40 mg IV daily





11. Severe Malnutrition, with protein calorie malnutrition, insulin light, 3 

times a day with meals





12.  DVT prophylaxis.  Ambulation





13.  COVID-19 infection not present





14. abdominal pain: No concern for abdominal abscess.  Colonoscopy recommended 

as outpatient





Discharge plan: Home without home care

## 2020-07-04 NOTE — P.PN
Subjective


Progress Note Date: 07/04/20


Patient is still complaining of RUQ and mid epigastric pain. He has been 

afebrile. He is having BM. No NV








Objective





- Vital Signs


Vital signs: 


                                   Vital Signs











Temp  97.5 F L  07/04/20 07:00


 


Pulse  74   07/04/20 09:29


 


Resp  20   07/04/20 07:00


 


BP  133/77   07/04/20 07:00


 


Pulse Ox  97   07/04/20 07:00








                                 Intake & Output











 07/03/20 07/04/20 07/04/20





 18:59 06:59 18:59


 


Intake Total 640 880 


 


Balance 640 880 


 


Intake:   


 


  IV  680 


 


    Sodium Chloride 0.9% 1,  680 





    000 ml @ 75 mls/hr IV .   





    [id number] FARIHA Rx#:[id number]   


 


  Intake, IV Titration  200 





  Amount   


 


    Cefepime 2 gm In Sodium  100 





    Chloride 0.9% 100 ml @   





    200 mls/hr IVPB Q8HR FARIHA   





    Rx#:[id number]   


 


    metroNIDAZOLE-NS   100 





    mg In Saline 1 100ml.bag   





    @ 100 mls/hr IVPB Q8HR   





    FARIHA Rx#:[id number]   


 


  Oral 640  


 


Other:   


 


  Voiding Method  Toilet 


 


  # Voids 3 1 


 


  # Bowel Movements 1 1 














- Constitutional


General appearance: Present: mild distress





- Respiratory


Details: 





Somewhat SOB on NC





- Gastrointestinal


Gastrointestinal Comment(s): 





S/ND/ TTP in RUQ and mid epigastric. No RRG





- Psychiatric


Psychiatric: Present: A&O x's 3





- Labs


CBC & Chem 7: 


                                 07/04/20 08:12





                                 07/03/20 09:30


Labs: 


                  Abnormal Lab Results - Last 24 Hours (Table)











  07/04/20 Range/Units





  08:12 


 


WBC  13.5 H  (3.8-10.6)  k/uL


 


RBC  2.43 L  (4.30-5.90)  m/uL


 


Hgb  7.6 L  (13.0-17.5)  gm/dL


 


Hct  25.2 L  (39.0-53.0)  %


 


MCV  103.6 H  (80.0-100.0)  fL


 


MCHC  30.1 L  (31.0-37.0)  g/dL


 


RDW  23.6 H  (11.5-15.5)  %


 


Neutrophils # (Manual)  12.15 H  (1.3-7.7)  k/uL


 


Lymphocytes # (Manual)  0.95 L  (1.0-4.8)  k/uL


 


Macrocytosis  Marked A  








                      Microbiology - Last 24 Hours (Table)











 06/27/20 16:55 Blood Culture - Final





 Blood    No Growth after 144 hours


 


 07/01/20 06:00 Stool Culture - Preliminary





 Stool 














Assessment and Plan


Assessment: 





Abdominal pain


Pleural effusion


Leukopenia MDS 


Plan: 


Patient has had persistant RUQ and mid epigastric pain. I recommend US and HIDA 

scan to rule out cholecystitis. If the patient does have cholecystitis I would 

recommend IR consult for cholesytostomy tube placement. Given his multiple 

medical co-morbidities and COPD exacerbation along with MDS and severe protein 

malnutrition patient is a very poor surgical candidate and would be high risk 

for morbidity and mortality. I do agree with PPI and emperic treatment for 

gastritis/PUD. This is likely unrelated to the questionable mass seen in 

patients cecum which could relate to stool burden. Patients pain is located in 

the upper abdomen. I do think the patient would benefit from endoscopy both 

upper and lower however this would be recommended in the outpatient setting when

patient is a better medical candidate and his chronic conditions are better 

controlled however further recs will follow imaging and this could be 

reconsidered if imaging of gallbladder is negative for acute pathology

## 2020-07-05 LAB
ALBUMIN SERPL-MCNC: 2.7 G/DL (ref 3.5–5)
ALP SERPL-CCNC: 81 U/L (ref 38–126)
ALT SERPL-CCNC: 62 U/L (ref 4–49)
ANION GAP SERPL CALC-SCNC: 6 MMOL/L
AST SERPL-CCNC: 32 U/L (ref 17–59)
BUN SERPL-SCNC: 30 MG/DL (ref 9–20)
CALCIUM SPEC-MCNC: 8.2 MG/DL (ref 8.4–10.2)
CELLS COUNTED: 100
CHLORIDE SERPL-SCNC: 107 MMOL/L (ref 98–107)
CO2 SERPL-SCNC: 30 MMOL/L (ref 22–30)
ERYTHROCYTE [DISTWIDTH] IN BLOOD BY AUTOMATED COUNT: 2.16 M/UL (ref 4.3–5.9)
ERYTHROCYTE [DISTWIDTH] IN BLOOD: 23.6 % (ref 11.5–15.5)
GLUCOSE SERPL-MCNC: 127 MG/DL (ref 74–99)
HCT VFR BLD AUTO: 21.4 % (ref 39–53)
HGB BLD-MCNC: 6.8 GM/DL (ref 13–17.5)
LYMPHOCYTES # BLD MANUAL: 1.45 K/UL (ref 1–4.8)
MCH RBC QN AUTO: 31.4 PG (ref 25–35)
MCHC RBC AUTO-ENTMCNC: 31.8 G/DL (ref 31–37)
MCV RBC AUTO: 99 FL (ref 80–100)
MONOCYTES # BLD MANUAL: 0.54 K/UL (ref 0–1)
NEUTROPHILS NFR BLD MANUAL: 88 %
NEUTS SEG # BLD MANUAL: 16.1 K/UL (ref 1.3–7.7)
PLATELET # BLD AUTO: 188 K/UL (ref 150–450)
POTASSIUM SERPL-SCNC: 3 MMOL/L (ref 3.5–5.1)
PROT SERPL-MCNC: 6.6 G/DL (ref 6.3–8.2)
SODIUM SERPL-SCNC: 143 MMOL/L (ref 137–145)
WBC # BLD AUTO: 18.1 K/UL (ref 3.8–10.6)

## 2020-07-05 PROCEDURE — 30233N1 TRANSFUSION OF NONAUTOLOGOUS RED BLOOD CELLS INTO PERIPHERAL VEIN, PERCUTANEOUS APPROACH: ICD-10-PCS

## 2020-07-05 RX ADMIN — PANTOPRAZOLE SODIUM SCH MG: 40 TABLET, DELAYED RELEASE ORAL at 17:04

## 2020-07-05 RX ADMIN — METRONIDAZOLE SCH MLS/HR: 500 INJECTION, SOLUTION INTRAVENOUS at 08:17

## 2020-07-05 RX ADMIN — ACYCLOVIR SCH MG: 200 CAPSULE ORAL at 08:18

## 2020-07-05 RX ADMIN — FUROSEMIDE SCH MG: 10 INJECTION, SOLUTION INTRAMUSCULAR; INTRAVENOUS at 09:00

## 2020-07-05 RX ADMIN — PANTOPRAZOLE SODIUM SCH MG: 40 TABLET, DELAYED RELEASE ORAL at 08:18

## 2020-07-05 RX ADMIN — DICYCLOMINE HYDROCHLORIDE SCH: 20 TABLET ORAL at 11:37

## 2020-07-05 RX ADMIN — FUROSEMIDE SCH MG: 10 INJECTION, SOLUTION INTRAMUSCULAR; INTRAVENOUS at 08:18

## 2020-07-05 RX ADMIN — ACYCLOVIR SCH MG: 200 CAPSULE ORAL at 16:23

## 2020-07-05 RX ADMIN — CEFEPIME HYDROCHLORIDE SCH MLS/HR: 2 INJECTION, POWDER, FOR SOLUTION INTRAVENOUS at 23:13

## 2020-07-05 RX ADMIN — CEFEPIME HYDROCHLORIDE SCH MLS/HR: 2 INJECTION, POWDER, FOR SOLUTION INTRAVENOUS at 16:23

## 2020-07-05 RX ADMIN — IPRATROPIUM BROMIDE AND ALBUTEROL SULFATE PRN ML: .5; 3 SOLUTION RESPIRATORY (INHALATION) at 09:01

## 2020-07-05 RX ADMIN — DICYCLOMINE HYDROCHLORIDE SCH MG: 20 TABLET ORAL at 17:04

## 2020-07-05 RX ADMIN — ACYCLOVIR SCH MG: 200 CAPSULE ORAL at 22:20

## 2020-07-05 RX ADMIN — BUDESONIDE AND FORMOTEROL FUMARATE DIHYDRATE SCH PUFF: 80; 4.5 AEROSOL RESPIRATORY (INHALATION) at 09:00

## 2020-07-05 RX ADMIN — METRONIDAZOLE SCH: 500 INJECTION, SOLUTION INTRAVENOUS at 17:02

## 2020-07-05 RX ADMIN — BUDESONIDE AND FORMOTEROL FUMARATE DIHYDRATE SCH PUFF: 80; 4.5 AEROSOL RESPIRATORY (INHALATION) at 19:59

## 2020-07-05 RX ADMIN — DICYCLOMINE HYDROCHLORIDE SCH MG: 20 TABLET ORAL at 22:20

## 2020-07-05 RX ADMIN — FUROSEMIDE SCH MG: 10 INJECTION, SOLUTION INTRAMUSCULAR; INTRAVENOUS at 22:20

## 2020-07-05 RX ADMIN — FLUCONAZOLE SCH MG: 100 TABLET ORAL at 08:18

## 2020-07-05 RX ADMIN — IPRATROPIUM BROMIDE AND ALBUTEROL SULFATE PRN ML: .5; 3 SOLUTION RESPIRATORY (INHALATION) at 16:07

## 2020-07-05 RX ADMIN — DICYCLOMINE HYDROCHLORIDE SCH MG: 20 TABLET ORAL at 11:23

## 2020-07-05 RX ADMIN — CEFEPIME HYDROCHLORIDE SCH MLS/HR: 2 INJECTION, POWDER, FOR SOLUTION INTRAVENOUS at 07:40

## 2020-07-05 RX ADMIN — IPRATROPIUM BROMIDE AND ALBUTEROL SULFATE PRN ML: .5; 3 SOLUTION RESPIRATORY (INHALATION) at 12:10

## 2020-07-05 NOTE — PN
PROGRESS NOTE



DATE OF SERVICE:

July 5, 2020.



CHIEF COMPLAINT:

Tired and abdominal pain.



Omega is seen today as a followup.  He feels a little tired, but overall stated his

abdominal pain is better.  No nausea and had a bowel movement last night.  He did

undergo an ultrasound of the abdomen and HIDA scan and there was no evidence of

cholecystitis.  However, he was started on broad-spectrum antibiotics for presumed

cholecystitis.



CURRENT MEDICATION:

Reviewed in his electronic medical record.



On physical examination, he is alert and oriented x3.  He does not appear to be in

distress at this time.  His vital signs are:  Pulse is 80, regular.  Temperature 98,

pulse 72, regular, respiration 18, blood pressure 141/76, pulse ox 97 percent on 2 L

nasal cannula.

HEENT:  Normocephalic, atraumatic.  No icterus.

NECK:  Supple.

CHEST equal expansion bilaterally.

LUNGS:  Clear to auscultation and percussion.  Heart is regular rate and rhythm.

ABDOMEN:  Soft.  He has tenderness in the epigastric area and the right upper quadrant.

However, it appears to be ill-defined lesion.  Extremities reveal no significant edema.



IMPRESSION AND RECOMMENDATION:

1. Neutropenic fever.  The patient appears to be clinically doing well.  However,

    there his neutrophil has recovered and his leukocytosis now is secondary to recent

    granulocyte colony-stimulating factor which was discontinued yesterday.

2. Myelodysplastic syndrome, status post 1 cycle of hypomethylating agent.

3. Abdominal pain.  Appears to be improving and there is no sonographic evidence of

    cholecystitis on recent ultrasound and HIDA scan.

4. Based on recent CAT scan finding,  endoscopy evaluation should be highly considered

    once the patient's condition permits.

5. Continue current supportive care.

6.





MMODL / IJN: [id number] / Job#: [id number]

## 2020-07-05 NOTE — P.PN
Subjective


Progress Note Date: 07/05/20





This is a 70-year-old patient of Dr. Luu with a past medical history of 

anemia, gout, tobacco use and dependence, daily alcohol use.  And cancer.  

Patient is unsure of the type of cancer that he has.  He has been receiving 

chemotherapy and blood transfusions.  He is under care with Dr. Dos Santos.  Patient 

presented yesterday for evaluation of fever and generalized weakness.  Patient a

transfusion yesterday morning at approximately 9 AM.  He was noted to have a 

fever during the afternoon.  Patient states that his most recent chemotherapy 

was approximately 2 weeks ago.  He denies any pain or at this time.  Denies any 

vomiting or diarrhea.  Patient states that he has been short of breath recently 

with any activity.  Denies any cough.  Patient is a current smoker.  Patient was

discharged June 25, and comes in again to the emergency room with the same 

problem, fever, failure of a cycle of year, and Levaquin, for which the regimen 

was complied on by family.  Patient was hesitant on going to hospice, and is not

feeling ready to be in a hospice program yet.





 Patient was admitted from June 21 until June 25, seen by oncology ID, discharge

med at that time a cycle of year Levaquin, along with other maintenance meds, he

received to admission 1 unit of packed red blood cell, as well as colony simu

lating factor, at that time he received Vanco supper, there was some suspicion 

of either a wedge like abnormality in the spleen, during his last CAT scan.  

Fever persists at home, and started 24 hours after discharge.  Patient is 

coughing, has diarrhea, 4 times watery, no blood, patient denies any aspiration 

events, patient continues to smoke, patient started on cefepime, and vancomycin,

Flagyl was added started secondary diarrhea.  Cultures were obtained ID consult 

oncology consult, patient has COPD exacerbation as well, as well as an 

orthopedic fever, and a new right upper lobe infiltrate, malignancy cannot be 

ruled out, CT chest to be done, with IV contrast.  Urinalysis negative.





6/29: Patient was evaluated on morning rounds. Denies any complaints. He is 

currently afebrile 97.7, blood pressure is 91/50, heart rate 67. WBC 0.9, Hgb 

8.7, Hct 29.5, neutrophils 0.10, blood cultures show no growth to date. He 

continues on Acyclovir, Cefepine, Vancomycin, and Metronidazole. Infectious di

clau is on consult. He continues on Zarxio, discussion with patient if WBC does

not improve by Wednesday, may need transfer to [facility] in [place]. Oncology note 

reviewed, discussion with patient and wife regarding Hospice and different 

treatment options. Will follow up with their decision. 





6/30: Patient was examined this morning, noted to be sitting up at the bedside. 

Denies any new complaints.  Repeat white count this morning was 1.1, neutrophils

0.4, hemoglobin 7.4, hematocrit 23.8.  Patient remains on the cefepime, Flagyl, 

and vancomycin.  Awaiting further culture report, blood cultures show no growth 

to date, sputum cultures pending, infectious disease is on consult.  Vital signs

remain stable blood pressure 102/59, heart rate 65, temperature 97.7, 98% on 

room air.  Patient remains neutropenic despite treatment.  If labs do not 

improve by tomorrow may need transfer to [facility] in [place] for further treatment.





7/1: Patient noted to be sitting up at his bedside, resting comfortable.  He 

denies any new complaints.  He remains afebrile 97.5, heart rate 66, pressure 

139/78, 97% on room air.  White count and neutrophils did improve, WBC 1.6 

neutrophils 1.10, hemoglobin remained stable at 7.3 hematocrit 23.5.  Blood 

cultures still show no growth today he continues on a Acyclovir, cefepime, 

vancomycin, and metronidazole.  He continues on Zarxio.  Oncology note reviewed 

no plans to transfer patient, plans on continuing current treatment. 





7/2: Patient sitting up at the side of the bed, has complaints of upper 

abdominal pain that started this morning. On exam he is tender with palpitation,

he denies any constipation or vomiting. States last bowel movement was this 

morning. CT of the abdomen ordered as well as consult to surgery to rule out 

abscess. Labs are slowly improving, WBC 2.7, Hgb 7.9, Hct 26.5, Neutrophils are 

1.8. He remains on cefepime and Flagyl and acyclovir for prophylaxis, vancomycin

was discontinued per infectious disease. He continues on GCSF, oncology noted 

reviewed, options to end treatment or treat for another 2 cycles to see if the 

treatment improves his counts to decrease infection risk and transfusion needs. 

Family and patient has not made a decision yet. Vitals are stable 138/77, 78, 

14, 97%, he remains afbrile 97.8. 





7/3: Patient evaluated sitting up at the bedside.  He still has complaints of 

upper abdominal pain continues to be tender with palpitation, denies any 

constipation or vomiting.  Surgery on consult for abnormality at the cecum. 

Patient continues on cefepime and Flagyl, sputum cultures were positive for 

Candida, infectious disease recommends are to finish therapy with oral 

antibiotics and to continue supportive care.  White count continues to trend up 

WBC 6.8, hemoglobin 8, hematocrit 26.4, neutrophils 1.8.  Per oncology's notes 

will continue on Zarxio while inpatient and add GCSF to treatment regimen.  He 

remains afebrile 97.7, heart rate is 75, respiratory rate of 18, blood pressure 

140/80, 95% on room air.





7/4 patient examined sitting in the bed.  Complains of  mid abdominal pain and 

worsening shortness of breath.  Patient did have a small bowel movement today 

but denies any diarrhea.  Chest x-ray suggestive of worsening interstitial edema

and bilateral pleural effusions worse on the right compared to left.  Vitals are

stable with a temperature 97.5 blood pressure 133/77 and saturating 97% on 2 L. 

Patient's labs from today is pending.  Lasix 40 IV daily initiated.  Continue 

Protonix.  Continue antibiotics cefepime and Flagyl.  Patient is growing Candida

on the sputum cultures but appears to be colonizer.  We will discuss antibiotic 

of choice on discharge for the patient.  Abdominal x-ray ordered continue DuoNeb

as needed for shortness of breath.  If no improvement in abdominal pain, 

gastroenterology will be consulted





7/5 patient examined sitting in the bed.  He continues to complain of mid abdom

inal pain and worsening shortness of breath.  Shortness of breath is slightly 

improved since yesterday.  Patient received additional dose of Lasix in the 

evening for worsening shortness of breath.  Chest x-ray concerning for 

interstitial edema and bilateral pleural effusions.  Abdominal ultrasound and 

HIDA negative for acute cholecystitis.  Lasix increased from 40 once daily to 40

twice a day today.  Continue prednisone 40 mg by mouth daily for COPD.  

Gastroenterology consulted for possible EGD to assess for patient's worsening 

abdominal pain. Hb 6.8  s/p 1 units PRBC 





ROS 


Constitutional: Denies chills, Denies  fever, endorses lethargy,


Eyes: denies decreased vision, denies diplopia, denies discharge, denies pain


Ears: deny: decreased hearing


Ears, nose, mouth and throat: Denies dental pain, Denies headache, Denies nasal 

discharge, Denies nose pain


Cardiovascular: Denies chest pain, Denies decreased exercise tolerance, Denies 

edema, Denies high blood pressure, Denies irregular heart beat, Denies 

palpitations, Denies paroxysmal nocturnal dyspnea, Denies rapid heart beat, 

Denies shortness of breath


Respiratory: Denies congestion, Denies cough, Denies cough with sputum, endorses

dyspnea, Denies home oxygen, Denies wheezing


Gastrointestinal: Endorses right upper and mid quadrant abdominal pain, Denies 

change in bowel habits, Denies coffee ground emesis, Denies early satiety, 

Denies excessive gas, Denies heartburn, Denies hematemesis, Denies hematochezia,

Denies loss of appetite, Denies nausea, Denies vomiting and diarrhea resolved


Genitourinary: Denies dysuria, Denies flank pain, Denies kidney stones, Denies 

menorrhagia, Denies urgency, Denies urinary frequency


Musculoskeletal: Denies gait dysfunction, Denies limitation of motion, Denies 

morning stiffness, Denies muscle cramps


Integumentary: Denies rash, Denies wounds, Denies brittle nails, Denies change 

in hair/nails, Denies darkening of skin


Neurological: Denies balance difficulties, Denies change in speech, Denies 

double vision, Denies gait dysfunction, Denies loss of vision, Denies motor 

disturbance, Denies numbness, Denies paralysis, Denies paresthesias, Denies 

seizures


Psychiatric: Denies anxiety, Denies depression


Endocrine: Denies excessive sweating, Denies excessive thirst, Denies high blood

sugars, Denies palpitations


Hematologic/Lymphatic: Denies easy bruising, Denies lymphadenopathy





Objective





- Vital Signs


Vital signs: 


                                   Vital Signs











Temp  98.0 F   07/05/20 07:59


 


Pulse  72   07/05/20 07:59


 


Resp  18   07/05/20 07:59


 


BP  141/76   07/05/20 07:59


 


Pulse Ox  98   07/05/20 07:59








                                 Intake & Output











 07/04/20 07/05/20 07/05/20





 18:59 06:59 18:59


 


Intake Total  800 


 


Output Total  1700 


 


Balance  -900 


 


Intake:   


 


  IV  600 


 


    Sodium Chloride 0.9% 1,  600 





    000 ml @ 75 mls/hr IV .   





    [id number] [facility] Rx#:[id number]   


 


  Intake, IV Titration  200 





  Amount   


 


    Cefepime 2 gm In Sodium  100 





    Chloride 0.9% 100 ml @   





    200 mls/hr IVPB [facility]   





    Rx#:[id number]   


 


    metroNIDAZOLE-NS   100 





    mg In Saline 1 100ml.bag   





    @ 100 mls/hr IVPB Q8HR   





    [facility] Rx#:[id number]   


 


Output:   


 


  Urine  1700 


 


Other:   


 


  Voiding Method  Toilet 





  Urinal 


 


  # Voids 3 2 














- Exam





- Exam





- Constitutional


General appearance: cooperative, no acute distress, thin.





- EENT


Eyes: anicteric sclerae, EOMI, PERRLA, dentition normal, normal appearance


ENT: NA/AT, normal oropharynx





- Neck


Neck: normal ROM





- Respiratory


Respiratory: bilateral: wheezing, negative: CTA, diminished, dullness





- Cardiovascular


Rhythm: regular


Heart sounds: normal: S1


Abnormal Heart Sounds: no systolic murmur, no diastolic murmur, no rub, no S3 

Gallop, no S4 Gallop, no click, no other





- Gastrointestinal


General gastrointestinal: normal bowel sounds, soft, tender with palpitation 

right upper quadrant and epigastric





- Integumentary


Integumentary: decreased turgor, normal





- Neurologic


Neurologic: CNII-XII intact





- Musculoskeletal


Musculoskeletal: gait normal, strength equal bilaterally





- Psychiatric


Psychiatric: A&O x's 3, appropriate affect, intact judgment & insight








- Labs


CBC & Chem 7: 


                                 07/05/20 08:05





                                 07/05/20 08:05


Labs: 


                  Abnormal Lab Results - Last 24 Hours (Table)











  07/04/20 Range/Units





  08:12 


 


WBC  13.5 H  (3.8-10.6)  k/uL


 


RBC  2.43 L  (4.30-5.90)  m/uL


 


Hgb  7.6 L  (13.0-17.5)  gm/dL


 


Hct  25.2 L  (39.0-53.0)  %


 


MCV  103.6 H  (80.0-100.0)  fL


 


MCHC  30.1 L  (31.0-37.0)  g/dL


 


RDW  23.6 H  (11.5-15.5)  %


 


Neutrophils # (Manual)  12.15 H  (1.3-7.7)  k/uL


 


Lymphocytes # (Manual)  0.95 L  (1.0-4.8)  k/uL


 


Macrocytosis  Marked A  








                      Microbiology - Last 24 Hours (Table)











 07/01/20 06:00 Stool Culture - Final





 Stool 














Assessment and Plan


Plan: 





1.  Neutropenic fever with recurrent admission, sepsis with failure of Levaquin 

prior to admission IK abdominal source.  Continue cefepime and Flagyl ID and 

oncology on consult. Blood culture blood culture negative C. diff negative, 

previous imaging studies 6/25 during last admission, shows 1.2 cm kidney cyst, 

not  abscess, wedge-shaped hypodensity in spleen, related to either infectious 

and inflammatory against ischemic etiology, cecal mass versus Malachi Ks colonoscopy

recommended as outpatient by surgery, trace left pelvic ascites moderate wall 

thickening gastroduodenal junction, H. pylori pending as patient has persistent 

epigastric pain, cxr right infiltrate vs nodule IV Flagyl admitted for diarrhea,

O&P obtained, as well as stool cultures, stool cultures have been collected and 

are currently pending.





2.  Poorly marginated right upper lobe infiltrate, 1.7 cm, CT of the chest, to 

evaluate for pulmonary nodule against pneumonia, IV antibiotics





3.  Persistent epigastric pain check for H. pylori, possible abnormal thickening

in the gastroduodenal junction, not an abscess, on PPI offered





4.  COPD exacerbation currently smoker nebulized treatments, albuterol Pulmi

lena, IV Solu-Medrol switched to prednisone, patient was advised to quit smoking

still, continue, IV antibiotics sputum cultures show Candida





5 Chronic anemia.  See above.  Status post transfusion of 1 unit of packed RBCs,

stable.  Continue Zarxio





6  MDS with ringed sideroblasts see above





7  Tobacco use and dependence





8  Alcohol abuse, stable





9 Diarrhea, with ongoing antibiotic for sepsis, Flagyl added to regimen of 

cefepime, C. diff toxin negative





10  GI prophylaxis.  Pantoprazole 40 mg IV daily





11. Severe Malnutrition, with protein calorie malnutrition, insulin light, 3 

times a day with meals





12.  DVT prophylaxis.  Ambulation





13.  COVID-19 infection not present





14. abdominal pain: No concern for abdominal abscess.  HIDA scan and abdominal 

ultrasound negative for acute cholecystitis.  Gastroenterology consult


Discharge plan: Home without home care

## 2020-07-05 NOTE — P.CONS
History of Present Illness





- Reason for Consult


Consult date: 20


Abdominal pain


Requesting physician: David Rodriguez





- Chief Complaint


Weakness, fevers





- History of Present Illness





70-year-old male with multiple medical comorbidities including tobacco abuse, 

alcohol abuse, chronic anemia, gout who presented for evaluation of fevers and 

generalized weakness.  Patient is being seen by the hematology/oncology service 

and was found to be bicytopenia on presentation with WBC 0.6 and hemoglobin 7.  

Patient is currently being treated for neutropenic fever, pneumonia and 

myelodysplastic syndrome.  He has been given GCSF with improvement in his white

count.  He also remains on broad-spectrum antibiotic therapy.  Patient developed

abdominal pain described as severe, constant and waxing and waning in intensity 

in his right upper quadrant in the epigastric region of his abdomen.  He reports

that the pain is worse with movement.  He denies any association of the pain 

with food.  He denies any history of reflux disease.  No nausea or vomiting.  No

prior EGDs reported.  He has previously undergone colonoscopic evaluation with 

the surgical service in 2016 significant for diverticulosis.  He has been 

having some loose stool and there are concerns of possible dark colored stool 

today with stools occult blood and pending.  Current laboratory evaluation 

significant for a WBC 18.1, hemoglobin 6.8, platelet count 235,000 with total 

bilirubin 0.8, alkaline phosphatase 81, AST 32 and ALT 62.  Computed tomography 

scan of the abdomen was performed in evaluation of the patient's pain with 

findings of a new right-sided pleural effusion, and abnormal findings of the 

cecum felt to be either adherent debris or mass and a small amount of ascites.  

This did not correlate with the patient's pain and the surgical service was 

consulted to see the patient with concern for possible cholecystitis.  At this 

time ultrasound and HIDA were performed in evaluation and found to be 

essentially normal.  GI was consulted to see the patient due to the persistence 

of this pain.  However on questioning today the patient does report that it is f

eeling better.  He does report he is been able to tolerate diet.  Continues to 

have some loose bowel movements.








Review of Systems





REVIEW OF SYSTEMS:


CONSTITUTIONAL: Patient reports fatigue, and had complained of fevers and chills

on presentation.


CARDIOVASCULAR: Denies any chest pain, palpitations high or low blood pressures


RESPIRATORY: Denies any shortness of breath, hemoptysis or cough.  


GENITOURINARY:  No dysuria or hematuria. 


MUSCULOSKELETAL: No weakness reported. 


SKIN: Denies any new rashes or lesions, jaundice or pallor. 


PSYCHIATRIC: Denies any depression or anxiety. 


NEUROLOGY: Denies headache, denies any new focal deficits. 


EARS/NOSE/THROAT: No recent hearing change, congestion, nasal discharge or sore 

throat.


EYES: No pain in eyes, discharge or change in vision. 


GASTROINTESTINAL: As per HPI.





Past Medical History


Past Medical History: Cancer, Skin Disorder


Additional Past Medical History / Comment(s): Gout, cellulitis to left leg? 

(patient states he is itchy, some scabs and scarring noted), chronic anemia, 

bone marrow cancer


History of Any Multi-Drug Resistant Organisms: None Reported


Past Surgical History: No Surgical Hx Reported


Additional Past Surgical History / Comment(s): surgery on rt leg and foot after 

injury age 3


Past Anesthesia/Blood Transfusion Reactions: No Reported Reaction


Past Psychological History: No Psychological Hx Reported


Smoking Status: Current every day smoker


Past Alcohol Use History: Daily


Additional Past Alcohol Use History / Comment(s): Patient states he smokes 1/2 

ppd.  Patient drinks 4-6 beers per day for many years and cut down to 1-2 beers 

per day for the past 2 months.  Patient lives at home with his wife.


Past Drug Use History: None Reported





- Past Family History


  ** Mother


Family Medical History: Cancer





  ** Brother(s)


Family Medical History: Cancer





  ** Father


Additional Family Medical History / Comment(s): Father  at age 93 from old 

age.





Medications and Allergies


                                Home Medications











 Medication  Instructions  Recorded  Confirmed  Type


 


Acyclovir 400 mg PO TID #90 tablet 20 Rx


 


Fluconazole [Diflucan] 100 mg PO DAILY #30 tab 20 Rx


 


Acetaminophen Tab [Tylenol] 650 mg PO Q6HR PRN  tab 20 Rx


 


Levofloxacin [Levaquin] 500 mg PO DAILY 3 Days #14 tab 20 Rx


 


Multivitamins, Thera [Multivitamin 1 tab PO DAILY 20 History





(formulary)]    








                                    Allergies











Allergy/AdvReac Type Severity Reaction Status Date / Time


 


No Known Allergies Allergy   Verified 20 19:12














Physical Exam


Vitals: 


                                   Vital Signs











  Temp Pulse Pulse Resp BP Pulse Ox


 


 20 09:16   86    


 


 20 09:03   82    


 


 20 07:59  98.0 F   72  18  141/76  98


 


 20 04:00    80   


 


 20 03:00  98.0 F   66   120/66  99


 


 20 23:27    80  18  


 


 20 20:51   80    


 


 20 20:35   80     100


 


 20 19:50  97.4 F L   80  20  129/72  100


 


 20 15:00  97.9 F   100  18  147/75  98








                                Intake and Output











 20





 22:59 06:59 14:59


 


Intake Total 600 200 


 


Output Total 400 1300 


 


Balance 200 -1100 


 


Intake:   


 


    


 


    Sodium Chloride 0.9% 1, 600  





    000 ml @ 75 mls/hr IV .   





    [id number] FARIHA Rx#:[id number]   


 


  Intake, IV Titration  200 





  Amount   


 


    Cefepime 2 gm In Sodium  100 





    Chloride 0.9% 100 ml @   





    200 mls/hr IVPB Q8HR FARIHA   





    Rx#:[id number]   


 


    metroNIDAZOLE-NS   100 





    mg In Saline 1 100ml.bag   





    @ 100 mls/hr IVPB Q8HR   





    FARIHA Rx#:[id number]   


 


Output:   


 


  Urine 400 1300 


 


Other:   


 


  Voiding Method Toilet Toilet 





 Urinal Urinal 


 


  # Voids 1 2 














On physical examination, patient appears comfortable in no apparent distress. 


HEAD: Normocephalic, atraumatic. 


EYES: No scleral icterus. No conjunctival injection. 


MOUTH: No lesions, tongue midline. 


NECK: Trachea midline, no gross abnormalities. 


CHEST: Decreased air entry in all lung fields


HEART: S1-S2 appreciated. 


ABDOMEN: Soft, mildly tender to palpation in the epigastric region of his 

abdomen. Bowel sounds are positive. No organomegaly.  No guarding or rigidity.


EXTREMITIES: No pedal edema. 


SKIN: No rashes, no jaundice. 


NEUROLOGIC: Alert and oriented x3.  No focal deficits. 





Results


CBC & Chem 7: 


                                 20 08:05





                                 20 08:05


Labs: 


                  Abnormal Lab Results - Last 24 Hours (Table)











  20 Range/Units





  08:12 08:05 08:05 


 


WBC   18.1 H   (3.8-10.6)  k/uL


 


RBC   2.16 L   (4.30-5.90)  m/uL


 


Hgb   6.8 L*   (13.0-17.5)  gm/dL


 


Hct   21.4 L   (39.0-53.0)  %


 


RDW   23.6 H   (11.5-15.5)  %


 


Neutrophils # (Manual)  12.15 H    (1.3-7.7)  k/uL


 


Lymphocytes # (Manual)  0.95 L    (1.0-4.8)  k/uL


 


Potassium    3.0 L  (3.5-5.1)  mmol/L


 


BUN    30 H  (9-20)  mg/dL


 


Glucose    127 H  (74-99)  mg/dL


 


Calcium    8.2 L  (8.4-10.2)  mg/dL


 


ALT    62 H  (4-49)  U/L


 


Albumin    2.7 L  (3.5-5.0)  g/dL








                      Microbiology - Last 24 Hours (Table)











 20 06:00 Stool Culture - Final





 Stool 











CT scan - abdomen: report reviewed (Computed tomography scan of the abdomen was 

performed in evaluation of the patient's pain with findings of a new right-sided

pleural effusion, and abnormal findings of the cecum felt to be either adherent 

debris or mass and a small amount of ascites. )





Assessment and Plan


(1) Abdominal pain


Narrative/Plan: 


70-year-old male with multiple complex medical problems currently receiving 

treatment for myelodysplastic syndrome, neutropenic fever and suspected 

pneumonia.  He developed pain in the epigastric region and right upper quadrant 

of his abdomen during his hospitalization which reported as severe, constant 

waxing and waning in intensity.  No association with food but reported to be 

worse with movement.  No prior EGD or history of peptic ulcer disease he has had

a colonoscopy in 2016 with findings of diverticulosis.  Concern was for 

possible cholecystitis however both ultrasound and HIDA were normal.  Computed 

tomography scan of the abdomen negative for any findings correlating with his 

symptoms however a cecal mass versus adherent debris was seen in the cecum of 

the colon.  The patient has a chronic macrocytic anemia and was found to have a 

hemoglobin of 7 on presentation however it did subsequently improved slightly 

and felt to 6.8 today.  There were concerns over possible dark stool with cold 

stool for blood ordered and pending.  Unclear etiology symptoms, may be related 

to peptic ulcer disease, gastritis/esophagitis, associated with right sided 

pleural effusion, or other etiology.





Patient did report some improvement in his pain today.


Current Visit: Yes   Status: Acute   Code(s): R10.9 - UNSPECIFIED ABDOMINAL PAIN

  SNOMED Code(s): 92532440


   





(2) MDS (myelodysplastic syndrome), high grade


Current Visit: Yes   Status: Acute   Priority: High   Code(s): D46.Z - OTHER 

MYELODYSPLASTIC SYNDROMES   SNOMED Code(s): 273654992


   





(3) Neutropenic fever


Current Visit: Yes   Status: Acute   Priority: High   Code(s): D70.9 - 

NEUTROPENIA, UNSPECIFIED; R50.81 - FEVER PRESENTING WITH CONDITIONS CLASSIFIED 

ELSEWHERE   SNOMED Code(s): 675531997


   





(4) Bicytopenia


Current Visit: Yes   Status: Chronic   Priority: High   Code(s): D75.89 - OTHER 

SPECIFIED DISEASES OF BLOOD AND BLOOD-FORMING ORGANS   SNOMED Code(s): 11474552


   


Plan: 





Supportive care


Okay for diet


Protonix increased to twice daily


Dicyclomine added around-the-clock


Await stool testing for occult blood


Will order EIA for Clostridium difficile in the setting of antibiotic use and 

loose stool


Continue process which remains about therapy


Continue management by the hematology/oncology service


Appreciate recommendations by surgical service


In regards to possible endoscopic evaluation the patient is high risk with 

suspicion for underlying COPD and would need clearance by the pulmonology 

service is endoscopic evaluation was pursued, in order for optimization of 

breathing prior to the procedures


We will continue to follow clinically with decision based on the patient's 

course


Patient is to participate in care of the patient continue to follow

## 2020-07-05 NOTE — P.PN
Subjective


Progress Note Date: 07/05/20


Patient's abdominal pain is resolved today.  He is tolerating his diet he denies

any abdominal pain at this time





Objective





- Vital Signs


Vital signs: 


                                   Vital Signs











Temp  97.9 F   07/05/20 16:11


 


Pulse  78   07/05/20 16:20


 


Resp  16   07/05/20 16:11


 


BP  145/77   07/05/20 16:11


 


Pulse Ox  100   07/05/20 16:11








                                 Intake & Output











 07/04/20 07/05/20 07/05/20





 18:59 06:59 18:59


 


Intake Total  800 510


 


Output Total  1700 1600


 


Balance  -900 -1090


 


Intake:   


 


  IV  600 


 


    Sodium Chloride 0.9% 1,  600 





    000 ml @ 75 mls/hr IV .   





    [id number] FARIHA Rx#:[id number]   


 


  Intake, IV Titration  200 200





  Amount   


 


    Cefepime 2 gm In Sodium  100 100





    Chloride 0.9% 100 ml @   





    200 mls/hr IVPB Q8HR FARIHA   





    Rx#:[id number]   


 


    metroNIDAZOLE-NS   100 100





    mg In Saline 1 100ml.bag   





    @ 100 mls/hr IVPB Q8HR   





    FARIHA Rx#:[id number]   


 


  Blood Product   310


 


    Rc Irr As1  Unit   310





    [id number]   


 


Output:   


 


  Urine  1700 1600


 


Other:   


 


  Voiding Method  Toilet Toilet





  Urinal Urinal


 


  # Voids 3 2 5














- Constitutional


General appearance: Present: cooperative





- Gastrointestinal


Gastrointestinal Comment(s): 





Soft nontender nondistended





- Labs


CBC & Chem 7: 


                                 07/05/20 08:05





                                 07/05/20 08:05


Labs: 


                  Abnormal Lab Results - Last 24 Hours (Table)











  07/05/20 07/05/20 07/05/20 Range/Units





  08:05 08:05 10:44 


 


WBC  18.1 H    (3.8-10.6)  k/uL


 


RBC  2.16 L    (4.30-5.90)  m/uL


 


Hgb  6.8 L*    (13.0-17.5)  gm/dL


 


Hct  21.4 L    (39.0-53.0)  %


 


RDW  23.6 H    (11.5-15.5)  %


 


Neutrophils # (Manual)  16.10 H    (1.3-7.7)  k/uL


 


Potassium   3.0 L   (3.5-5.1)  mmol/L


 


BUN   30 H   (9-20)  mg/dL


 


Glucose   127 H   (74-99)  mg/dL


 


Calcium   8.2 L   (8.4-10.2)  mg/dL


 


ALT   62 H   (4-49)  U/L


 


Albumin   2.7 L   (3.5-5.0)  g/dL


 


Crossmatch    See Detail  








                      Microbiology - Last 24 Hours (Table)











 07/01/20 06:00 Stool Culture - Final





 Stool 














Assessment and Plan


Assessment: 





Abdominal pain resolved


Pleural effusion


Leukopenia MDS 


Plan: 


Patient's pain is resolved at this time.  His workup for cholecystitis is 

negative and patient is stable from a surgical standpoint.  GI is following the 

patient and I will defer endoscopic evaluation and timing to GI recommendations.

 Recommend continuing PPI.

## 2020-07-06 VITALS — RESPIRATION RATE: 20 BRPM | DIASTOLIC BLOOD PRESSURE: 56 MMHG | SYSTOLIC BLOOD PRESSURE: 143 MMHG | HEART RATE: 69 BPM

## 2020-07-06 VITALS — TEMPERATURE: 98.1 F

## 2020-07-06 LAB
ALBUMIN SERPL-MCNC: 3.1 G/DL (ref 3.5–5)
ALP SERPL-CCNC: 87 U/L (ref 38–126)
ALT SERPL-CCNC: 66 U/L (ref 4–49)
ANION GAP SERPL CALC-SCNC: 9 MMOL/L
AST SERPL-CCNC: 42 U/L (ref 17–59)
BUN SERPL-SCNC: 34 MG/DL (ref 9–20)
CALCIUM SPEC-MCNC: 8.4 MG/DL (ref 8.4–10.2)
CHLORIDE SERPL-SCNC: 101 MMOL/L (ref 98–107)
CO2 SERPL-SCNC: 31 MMOL/L (ref 22–30)
ERYTHROCYTE [DISTWIDTH] IN BLOOD BY AUTOMATED COUNT: 2.82 M/UL (ref 4.3–5.9)
ERYTHROCYTE [DISTWIDTH] IN BLOOD: 23 % (ref 11.5–15.5)
FERRITIN SERPL-MCNC: 5274.4 NG/ML (ref 22–322)
GLUCOSE SERPL-MCNC: 114 MG/DL (ref 74–99)
HCT VFR BLD AUTO: 26.8 % (ref 39–53)
HGB BLD-MCNC: 8.4 GM/DL (ref 13–17.5)
IRON SERPL-MCNC: 109 UG/DL (ref 65–175)
MCH RBC QN AUTO: 29.9 PG (ref 25–35)
MCHC RBC AUTO-ENTMCNC: 31.6 G/DL (ref 31–37)
MCV RBC AUTO: 94.9 FL (ref 80–100)
PLATELET # BLD AUTO: 192 K/UL (ref 150–450)
POTASSIUM SERPL-SCNC: 3 MMOL/L (ref 3.5–5.1)
PROT SERPL-MCNC: 7.4 G/DL (ref 6.3–8.2)
SODIUM SERPL-SCNC: 141 MMOL/L (ref 137–145)
TIBC SERPL-MCNC: 160 UG/DL (ref 228–460)
WBC # BLD AUTO: 15 K/UL (ref 3.8–10.6)

## 2020-07-06 RX ADMIN — FLUCONAZOLE SCH MG: 100 TABLET ORAL at 08:50

## 2020-07-06 RX ADMIN — CEFEPIME HYDROCHLORIDE SCH MLS/HR: 2 INJECTION, POWDER, FOR SOLUTION INTRAVENOUS at 07:01

## 2020-07-06 RX ADMIN — ACYCLOVIR SCH MG: 200 CAPSULE ORAL at 08:50

## 2020-07-06 RX ADMIN — BUDESONIDE AND FORMOTEROL FUMARATE DIHYDRATE SCH PUFF: 80; 4.5 AEROSOL RESPIRATORY (INHALATION) at 08:55

## 2020-07-06 RX ADMIN — PANTOPRAZOLE SODIUM SCH MG: 40 TABLET, DELAYED RELEASE ORAL at 07:01

## 2020-07-06 RX ADMIN — FUROSEMIDE SCH MG: 10 INJECTION, SOLUTION INTRAMUSCULAR; INTRAVENOUS at 08:50

## 2020-07-06 RX ADMIN — DICYCLOMINE HYDROCHLORIDE SCH MG: 20 TABLET ORAL at 08:50

## 2020-07-06 RX ADMIN — DICYCLOMINE HYDROCHLORIDE SCH MG: 20 TABLET ORAL at 12:21

## 2020-07-06 NOTE — PN
PROGRESS NOTE



DATE OF SERVICE:

07/06/2020



REASON FOR FOLLOWUP:

Febrile neutropenia.



INTERVAL HISTORY:

Patient is currently afebrile.  The patient has been breathing comfortably. The patient

denies having any chest pain or shortness of breath.  Minimal cough.  No nausea, no

vomiting.  Abdominal pain has improved, no diarrhea.



PHYSICAL EXAMINATION:

Blood pressure 143./56, pulse of 69, temperature 98.1, he is 95% on room air.

General description is an elderly male up in the bed, in no distress.

RESPIRATORY SYSTEM: Unlabored breathing, decreased breath sounds at the base.

HEART:  S1, S2.  Regular rate and rhythm.

ABDOMEN:  Soft, no tenderness.



LABS:

Hemoglobin 8.4, white count of 15,000, BUN of 34, creatinine 0.77.



DIAGNOSTIC IMPRESSION AND PLAN:

Patient with febrile neutropenia with concern for possible abdominal source.  Plus-

minus pneumonia.  Sputum has been negative for any resistant pathogen.  Patient overall

improvement on cefepime, Flagyl finishing therapy with oral Ceftin for about a week and

close outpatient followup.





MMODL / IJN: [id number] / Job#: [id number]

## 2020-07-06 NOTE — P.DS
Providers


Date of admission: 


06/27/20 18:21





Expected date of discharge: 07/06/20


Attending physician: 


Cheryle Steiner





Consults: 





                                        





06/27/20 18:22


Consult Physician Urgent 


   Consulting Provider: Eliezer Dos Santos


   Consult Reason/Comments: oncological care


   Do you want consulting provider notified?: Already Contacted





06/28/20 13:18


Consult Physician Routine 


   Consulting Provider: Neisha Hernandez


   Consult Reason/Comments: neutropenin fever


   Do you want consulting provider notified?: Yes





07/02/20 08:17


Consult Physician Routine 


   Consulting Provider: Orlando Kern


   Consult Reason/Comments: abdominal pain


   Do you want consulting provider notified?: Yes





07/05/20 08:49


Consult Physician Routine 


   Consulting Provider: Walker Black


   Consult Reason/Comments: worsening abd pain


   Do you want consulting provider notified?: Yes











Primary care physician: 


[facility]: 








This is a 70-year-old patient of Dr. Luu with a past medical history of 

anemia, gout, tobacco use and dependence, daily alcohol use.  And cancer.  

Patient is unsure of the type of cancer that he has.  He has been receiving 

chemotherapy and blood transfusions.  He is under care with Dr. Dos Santos.  Patient p

resented yesterday for evaluation of fever and generalized weakness.  Patient a 

transfusion yesterday morning at approximately 9 AM.  He was noted to have a 

fever during the afternoon.  Patient states that his most recent chemotherapy 

was approximately 2 weeks ago.  He denies any pain or at this time.  Denies any 

vomiting or diarrhea.  Patient states that he has been short of breath recently 

with any activity.  Denies any cough.  Patient is a current smoker.  Patient was

discharged June 25, and comes in again to the emergency room with the same 

problem, fever, failure of a cycle of year, and Levaquin, for which the regimen 

was complied on by family.  Patient was hesitant on going to hospice, and is not

feeling ready to be in a hospice program yet.





 Patient was admitted from June 21 until June 25, seen by oncology ID, discharge

med at that time a cycle of year Levaquin, along with other maintenance meds, he

received to admission 1 unit of packed red blood cell, as well as colony simula

ting factor, at that time he received Vanco supper, there was some suspicion of 

either a wedge like abnormality in the spleen, during his last CAT scan.  Fever 

persists at home, and started 24 hours after discharge.  Patient is coughing, 

has diarrhea, 4 times watery, no blood, patient denies any aspiration events, 

patient continues to smoke, patient started on cefepime, and vancomycin, Flagyl 

was added started secondary diarrhea.  Cultures were obtained ID consult 

oncology consult, patient has COPD exacerbation as well, as well as an 

orthopedic fever, and a new right upper lobe infiltrate, malignancy cannot be 

ruled out, CT chest to be done, with IV contrast.  Urinalysis negative.





6/29: Patient was evaluated on morning rounds. Denies any complaints. He is 

currently afebrile 97.7, blood pressure is 91/50, heart rate 67. WBC 0.9, Hgb 

8.7, Hct 29.5, neutrophils 0.10, blood cultures show no growth to date. He 

continues on Acyclovir, Cefepine, Vancomycin, and Metronidazole. Infectious dise

ase is on consult. He continues on Zarxio, discussion with patient if WBC does 

not improve by Wednesday, may need transfer to [facility] in [place]. Oncology note 

reviewed, discussion with patient and wife regarding Hospice and different 

treatment options. Will follow up with their decision. 





6/30: Patient was examined this morning, noted to be sitting up at the bedside. 

Denies any new complaints.  Repeat white count this morning was 1.1, neutrophils

0.4, hemoglobin 7.4, hematocrit 23.8.  Patient remains on the cefepime, Flagyl, 

and vancomycin.  Awaiting further culture report, blood cultures show no growth 

to date, sputum cultures pending, infectious disease is on consult.  Vital signs

remain stable blood pressure 102/59, heart rate 65, temperature 97.7, 98% on 

room air.  Patient remains neutropenic despite treatment.  If labs do not 

improve by tomorrow may need transfer to [facility] in [place] for further treatment.





7/1: Patient noted to be sitting up at his bedside, resting comfortable.  He d

enies any new complaints.  He remains afebrile 97.5, heart rate 66, pressure 

139/78, 97% on room air.  White count and neutrophils did improve, WBC 1.6 

neutrophils 1.10, hemoglobin remained stable at 7.3 hematocrit 23.5.  Blood 

cultures still show no growth today he continues on a Acyclovir, cefepime, 

vancomycin, and metronidazole.  He continues on Zarxio.  Oncology note reviewed 

no plans to transfer patient, plans on continuing current treatment. 





7/2: Patient sitting up at the side of the bed, has complaints of upper 

abdominal pain that started this morning. On exam he is tender with palpitation,

he denies any constipation or vomiting. States last bowel movement was this 

morning. CT of the abdomen ordered as well as consult to surgery to rule out 

abscess. Labs are slowly improving, WBC 2.7, Hgb 7.9, Hct 26.5, Neutrophils are 

1.8. He remains on cefepime and Flagyl and acyclovir for prophylaxis, vancomycin

was discontinued per infectious disease. He continues on GCSF, oncology noted r

madelin, options to end treatment or treat for another 2 cycles to see if the 

treatment improves his counts to decrease infection risk and transfusion needs. 

Family and patient has not made a decision yet. Vitals are stable 138/77, 78, 

14, 97%, he remains afbrile 97.8. 





7/3: Patient evaluated sitting up at the bedside.  He still has complaints of 

upper abdominal pain continues to be tender with palpitation, denies any 

constipation or vomiting.  Surgery on consult for abnormality at the cecum. 

Patient continues on cefepime and Flagyl, sputum cultures were positive for 

Candida, infectious disease recommends are to finish therapy with oral 

antibiotics and to continue supportive care.  White count continues to trend up 

WBC 6.8, hemoglobin 8, hematocrit 26.4, neutrophils 1.8.  Per oncology's notes 

will continue on Zarxio while inpatient and add GCSF to treatment regimen.  He 

remains afebrile 97.7, heart rate is 75, respiratory rate of 18, blood pressure 

140/80, 95% on room air.





7/4 patient examined sitting in the bed.  Complains of  mid abdominal pain and 

worsening shortness of breath.  Patient did have a small bowel movement today 

but denies any diarrhea.  Chest x-ray suggestive of worsening interstitial edema

and bilateral pleural effusions worse on the right compared to left.  Vitals are

stable with a temperature 97.5 blood pressure 133/77 and saturating 97% on 2 L. 

Patient's labs from today is pending.  Lasix 40 IV daily initiated.  Continue 

Protonix.  Continue antibiotics cefepime and Flagyl.  Patient is growing Candida

on the sputum cultures but appears to be colonizer.  We will discuss antibiotic 

of choice on discharge for the patient.  Abdominal x-ray ordered continue DuoNeb

as needed for shortness of breath.  If no improvement in abdominal pain, 

gastroenterology will be consulted





7/5 patient examined sitting in the bed.  He continues to complain of mid 

abdominal pain and worsening shortness of breath.  Shortness of breath is 

slightly improved since yesterday.  Patient received additional dose of Lasix in

the evening for worsening shortness of breath.  Chest x-ray concerning for 

interstitial edema and bilateral pleural effusions.  Abdominal ultrasound and 

HIDA negative for acute cholecystitis.  Lasix increased from 40 once daily to 40

twice a day today.  Continue prednisone 40 mg by mouth daily for COPD.  

Gastroenterology consulted for possible EGD to assess for patient's worsening 

abdominal pain. Hb 6.8  s/p 1 units PRBC 





7/6: Patient is status post transfusion of one unit of packed RBCs with repeat 

hemoglobin today of 8.4.  WBC 15, platelet count 192.  Potassium is 3.0 and will

be replaced.  BUN 34 and creatinine 0.77.  ALT 66.  Stool for occult blood 

negative.  He has been afebrile, heart rate 69, blood pressure 143/56, pulse ox 

95%.  Patient denies any new complaints.  He states he is feeling well.  He has 

had no fever or chills.  Patient was seen by general surgery with no plan for 

any interventions as abdominal pain had resolved and he was tolerating diet.  

Patient was seen by GI over the weekend and would recommend clearance by 

pulmonology before any endoscopy procedure due to possible underlying COPD.  

Patient will have outpatient follow-up if necessary.  Lab work reviewed with 

oncology and cleared for discharge.  Patient will be discharged home today in 

stable condition.





Discharge diagnoses:


1.  Neutropenic fever with recurrent admission, sepsis with failure of Levaquin,

possible abdominal source. 


2.  Poorly marginated right upper lobe infiltrate, 1.7 cm, CT of the chest, to 

evaluate for pulmonary nodule.


3.  Persistent epigastric pain. H. pylori pending.


4.  COPD exacerbation.


5.  Tobacco use and dependence.


6.  Anemia of chronic disease status post transfusion 1 unit of packed RBCs.


7.  MDS with ringed sideroblasts. 


8.  Alcohol abuse, stable


9.  Diarrhea, C. diff toxin negative


11. Severe Malnutrition with protein calorie malnutrition.


12.  COVID-19 infection not present





Discharge plan: Home without home care


Impression and plan of care have been directed as dictated by the signing 

physician.  Brittney Bennett nurse practitioner acting as scribe for signing 

physician.





Patient Condition at Discharge: Good





Plan - Discharge Summary


Discharge Rx Participant: Yes


New Discharge Prescriptions: 


New


   Dicyclomine [Bentyl] 20 mg PO QID #120 tab


   Cefuroxime Axetil [Ceftin] 500 mg PO BID 7 Days #14 tab


   metroNIDAZOLE [Flagyl] 500 mg PO Q8HR #21 tab


   Potassium Chloride ER [K-Dur 20] 20 meq PO DAILY #30 tab


   Furosemide [Lasix] 40 mg PO DAILY #30 tablet


   predniSONE 0 mg PO AS DIRECTED #30 tab


   Budesonide/Formoterol Fumarate [Symbicort 80-4.5 Mcg Inhaler] 2 puff 

INHALATION BID #1 inhaler


   Benzonatate [Tessalon Perles] 200 mg PO TID PRN #30 cap


     PRN Reason: Cough


   Albuterol Inhaler [Ventolin Hfa Inhaler] 2 puff INHALATION RT-TID #1 inhaler





Continue


   Acyclovir 400 mg PO TID #90 tablet


   Fluconazole [Diflucan] 100 mg PO DAILY #30 tab


   Acetaminophen Tab [Tylenol] 650 mg PO Q6HR PRN  tab


     PRN Reason: Mild Pain Or Fever > 100.5


   Multivitamins, Thera [Multivitamin (formulary)] 1 tab PO DAILY





Discontinued


   Levofloxacin [Levaquin] 500 mg PO DAILY 3 Days #14 tab


Discharge Medication List





Acyclovir 400 mg PO TID #90 tablet 06/24/20 [Rx]


Fluconazole [Diflucan] 100 mg PO DAILY #30 tab 06/24/20 [Rx]


Acetaminophen Tab [Tylenol] 650 mg PO Q6HR PRN  tab 06/25/20 [Rx]


Multivitamins, Thera [Multivitamin (formulary)] 1 tab PO DAILY 06/27/20 

[History]


Albuterol Inhaler [Ventolin Hfa Inhaler] 2 puff INHALATION RT-TID #1 inhaler 

07/06/20 [Rx]


Benzonatate [Tessalon Perles] 200 mg PO TID PRN #30 cap 07/06/20 [Rx]


Budesonide/Formoterol Fumarate [Symbicort 80-4.5 Mcg Inhaler] 2 puff INHALATION 

BID #1 inhaler 07/06/20 [Rx]


Cefuroxime Axetil [Ceftin] 500 mg PO BID 7 Days #14 tab 07/06/20 [Rx]


Dicyclomine [Bentyl] 20 mg PO QID #120 tab 07/06/20 [Rx]


Furosemide [Lasix] 40 mg PO DAILY #30 tablet 07/06/20 [Rx]


Potassium Chloride ER [K-Dur 20] 20 meq PO DAILY #30 tab 07/06/20 [Rx]


metroNIDAZOLE [Flagyl] 500 mg PO Q8HR #21 tab 07/06/20 [Rx]


predniSONE 0 mg PO AS DIRECTED #30 tab 07/06/20 [Rx]








Follow up Appointment(s)/Referral(s): 


Eliezer Dos Santos MD [STAFF PHYSICIAN] - 07/13/20 2:30 pm (This is a chemo appt.  Appt 

is at the [facility])


[facility], [NON-STAFF] - As Needed (home care and palliative care)


Garcia Luu DO [Primary Care Provider] - 1 Week


Walker Black MD [STAFF PHYSICIAN] - 2 Weeks


Patient Instructions/Handouts:  Neutropenia (DC)


Discharge Disposition: HOME WITH HOME HEALTH SERVICES

## 2020-07-06 NOTE — PN
PROGRESS NOTE



DATE OF SERVICE:

07/05/2020



REASON FOR FOLLOWUP:

Febrile neutropenia.



INTERVAL HISTORY:

Patient is currently afebrile.  The patient is breathing more comfortably.  Denies

having any chest pain, shortness of breath.  Occasional cough.  Abdominal pain has

improved.  No further nausea, vomiting or diarrhea.



PHYSICAL EXAMINATION:

His blood pressure 127/50 with a pulse of 92, temperature 97.8. He is 96% on room air.

General description is an elderly male up in the bed in no distress.

RESPIRATORY SYSTEM: Unlabored breathing, decreased breath sounds at the bases. No

wheeze.

HEART: S1, S2.  Regular rate and rhythm.

ABDOMEN:  Soft, no tenderness.



LABS:

Creatinine 0.78. White count 18.1, hemoglobin 6.8.



DIAGNOSTIC IMPRESSION AND PLAN:

Patient with febrile neutropenia.  Concern for possible abdominal source versus

pneumonia; however, culture has been negative.  The patient on cefepime and Flagyl.

Finish therapy with short course of oral Ceftin and Flagyl for about a week.  Continue

supportive care.





MMODL / IJN: [id number] / Job#: [id number]

## 2020-07-06 NOTE — PN
PROGRESS NOTE



DATE OF DICTATION:

07/06/2020



Patient is a 70-year-old pleasant white male with history of myelodysplastic syndrome

who was admitted to the hospital with febrile neutropenia.  He is on broad-spectrum

antibiotics. Symptoms are gradually improving.  During this hospitalization, he was

having some abdominal pain, nausea, vomiting, and hence GI has been consulted.  He was

seen on consultation by Dr. Black yesterday.  He did have a CT of the abdomen and

pelvis done that showed a questionable lesion in the cecum suspicious for stool debris

versus neoplasm.  The patient is going home today.  He is overall feeling better.  He

denies any abdominal pain.  No nausea, no vomiting.



PHYSICAL EXAMINATION:

Appears comfortable. No apparent distress.

Vital signs are stable. Blood pressure is 133/56, pulse rate 69, temperature 98.1.

HEENT examination unremarkable. Conjunctivae pink. Sclerae anicteric. Oral cavity no

lesions.

NECK: No JVD or lymph node enlargement.

CHEST: Clear to auscultation.

HEART:  Regular rate and rhythm.

ABDOMEN:  Soft.  Bowel sounds are positive.  No organomegaly.

EXTREMITIES: No pedal edema.

SKIN: No rashes.

NEUROLOGIC:  Alert and oriented x3.  No focal deficits.



LABS:

Labs from today show hemoglobin 8.1, WBC 18.6, platelets 188.  BUN 30, creatinine 1.78.



IMPRESSION:

1. Abdominal pain associated with nausea, vomiting, gradually improving.  Patient had

    a CT of the abdomen and pelvis done that showed questionable cecal mass versus

    adherent debris. He did have a low hemoglobin, probably contributed to

    myelodysplastic syndrome.  Stool occult blood was ordered which is still pending at

    the time of this dictation. Last colonoscopy in 2016 showed diverticulosis.

2. History of myelodysplastic syndrome, high grade, followed by Dr. Dos Santos.

3. Neutropenic fever, resolved.



RECOMMENDATIONS:

1. Okay to discharge home.

2. He was advised to follow up with Dr. Black on an outpatient basis in regard to

    discussion about a colonoscopy to evaluate the right colon, as the recent CT of the

    abdomen and pelvis showed a questionable lesion in the cecum.

3. Continue Protonix 40 mg twice daily.

4. Bentyl as needed.

Thank you for this consultation.





MMODL / IJN: [id number] / Job#: [id number]

## 2020-07-23 ENCOUNTER — HOSPITAL ENCOUNTER (INPATIENT)
Dept: HOSPITAL 47 - EC | Age: 70
LOS: 11 days | Discharge: HOSPICE | DRG: 871 | End: 2020-08-03
Attending: INTERNAL MEDICINE | Admitting: INTERNAL MEDICINE
Payer: MEDICARE

## 2020-07-23 VITALS — BODY MASS INDEX: 16.7 KG/M2

## 2020-07-23 DIAGNOSIS — Z20.828: ICD-10-CM

## 2020-07-23 DIAGNOSIS — J44.0: ICD-10-CM

## 2020-07-23 DIAGNOSIS — B44.9: ICD-10-CM

## 2020-07-23 DIAGNOSIS — Z79.899: ICD-10-CM

## 2020-07-23 DIAGNOSIS — L98.9: ICD-10-CM

## 2020-07-23 DIAGNOSIS — E43: ICD-10-CM

## 2020-07-23 DIAGNOSIS — N18.3: ICD-10-CM

## 2020-07-23 DIAGNOSIS — Z80.0: ICD-10-CM

## 2020-07-23 DIAGNOSIS — I50.33: ICD-10-CM

## 2020-07-23 DIAGNOSIS — M1A.9XX0: ICD-10-CM

## 2020-07-23 DIAGNOSIS — R91.8: ICD-10-CM

## 2020-07-23 DIAGNOSIS — F17.210: ICD-10-CM

## 2020-07-23 DIAGNOSIS — A41.9: Primary | ICD-10-CM

## 2020-07-23 DIAGNOSIS — Z79.51: ICD-10-CM

## 2020-07-23 DIAGNOSIS — J18.8: ICD-10-CM

## 2020-07-23 DIAGNOSIS — R19.7: ICD-10-CM

## 2020-07-23 DIAGNOSIS — T45.1X5A: ICD-10-CM

## 2020-07-23 DIAGNOSIS — Z91.19: ICD-10-CM

## 2020-07-23 DIAGNOSIS — Z87.01: ICD-10-CM

## 2020-07-23 DIAGNOSIS — G93.41: ICD-10-CM

## 2020-07-23 DIAGNOSIS — M19.90: ICD-10-CM

## 2020-07-23 DIAGNOSIS — Z66: ICD-10-CM

## 2020-07-23 DIAGNOSIS — D46.Z: ICD-10-CM

## 2020-07-23 DIAGNOSIS — N17.9: ICD-10-CM

## 2020-07-23 DIAGNOSIS — Z51.5: ICD-10-CM

## 2020-07-23 DIAGNOSIS — D61.810: ICD-10-CM

## 2020-07-23 DIAGNOSIS — B37.0: ICD-10-CM

## 2020-07-23 LAB
ALBUMIN SERPL-MCNC: 3.1 G/DL (ref 3.5–5)
ALP SERPL-CCNC: 61 U/L (ref 38–126)
ALT SERPL-CCNC: 43 U/L (ref 4–49)
ANION GAP SERPL CALC-SCNC: 7 MMOL/L
APTT BLD: 21.9 SEC (ref 22–30)
AST SERPL-CCNC: 47 U/L (ref 17–59)
BASOPHILS # BLD AUTO: 0 K/UL (ref 0–0.2)
BASOPHILS NFR BLD AUTO: 1 %
BUN SERPL-SCNC: 41 MG/DL (ref 9–20)
CALCIUM SPEC-MCNC: 8.3 MG/DL (ref 8.4–10.2)
CHLORIDE SERPL-SCNC: 99 MMOL/L (ref 98–107)
CO2 SERPL-SCNC: 30 MMOL/L (ref 22–30)
EOSINOPHIL # BLD AUTO: 0.1 K/UL (ref 0–0.7)
EOSINOPHIL NFR BLD AUTO: 8 %
ERYTHROCYTE [DISTWIDTH] IN BLOOD BY AUTOMATED COUNT: 1.52 M/UL (ref 4.3–5.9)
ERYTHROCYTE [DISTWIDTH] IN BLOOD: 28.1 % (ref 11.5–15.5)
GLUCOSE SERPL-MCNC: 84 MG/DL (ref 74–99)
HCT VFR BLD AUTO: 16.3 % (ref 39–53)
HGB BLD-MCNC: 5.1 GM/DL (ref 13–17.5)
INR PPP: 0.9 (ref ?–1.2)
LYMPHOCYTES # SPEC AUTO: 0.9 K/UL (ref 1–4.8)
LYMPHOCYTES NFR SPEC AUTO: 68 %
MCH RBC QN AUTO: 33.4 PG (ref 25–35)
MCHC RBC AUTO-ENTMCNC: 31.4 G/DL (ref 31–37)
MCV RBC AUTO: 106.6 FL (ref 80–100)
MONOCYTES # BLD AUTO: 0 K/UL (ref 0–1)
MONOCYTES NFR BLD AUTO: 1 %
NEUTROPHILS # BLD AUTO: 0.2 K/UL (ref 1.3–7.7)
NEUTROPHILS NFR BLD AUTO: 17 %
PLATELET # BLD AUTO: 28 K/UL (ref 150–450)
POTASSIUM SERPL-SCNC: 4.4 MMOL/L (ref 3.5–5.1)
PROT SERPL-MCNC: 6.4 G/DL (ref 6.3–8.2)
PT BLD: 9.7 SEC (ref 9–12)
SODIUM SERPL-SCNC: 136 MMOL/L (ref 137–145)
WBC # BLD AUTO: 1.3 K/UL (ref 3.8–10.6)

## 2020-07-23 PROCEDURE — 86901 BLOOD TYPING SEROLOGIC RH(D): CPT

## 2020-07-23 PROCEDURE — 87449 NOS EACH ORGANISM AG IA: CPT

## 2020-07-23 PROCEDURE — 87205 SMEAR GRAM STAIN: CPT

## 2020-07-23 PROCEDURE — 84145 PROCALCITONIN (PCT): CPT

## 2020-07-23 PROCEDURE — 86900 BLOOD TYPING SEROLOGIC ABO: CPT

## 2020-07-23 PROCEDURE — 83735 ASSAY OF MAGNESIUM: CPT

## 2020-07-23 PROCEDURE — 99285 EMERGENCY DEPT VISIT HI MDM: CPT

## 2020-07-23 PROCEDURE — 86606 ASPERGILLUS ANTIBODY: CPT

## 2020-07-23 PROCEDURE — 85027 COMPLETE CBC AUTOMATED: CPT

## 2020-07-23 PROCEDURE — 85610 PROTHROMBIN TIME: CPT

## 2020-07-23 PROCEDURE — 94760 N-INVAS EAR/PLS OXIMETRY 1: CPT

## 2020-07-23 PROCEDURE — 87040 BLOOD CULTURE FOR BACTERIA: CPT

## 2020-07-23 PROCEDURE — 94640 AIRWAY INHALATION TREATMENT: CPT

## 2020-07-23 PROCEDURE — 87046 STOOL CULTR AEROBIC BACT EA: CPT

## 2020-07-23 PROCEDURE — 83605 ASSAY OF LACTIC ACID: CPT

## 2020-07-23 PROCEDURE — 71260 CT THORAX DX C+: CPT

## 2020-07-23 PROCEDURE — 86140 C-REACTIVE PROTEIN: CPT

## 2020-07-23 PROCEDURE — 85652 RBC SED RATE AUTOMATED: CPT

## 2020-07-23 PROCEDURE — 87045 FECES CULTURE AEROBIC BACT: CPT

## 2020-07-23 PROCEDURE — 86850 RBC ANTIBODY SCREEN: CPT

## 2020-07-23 PROCEDURE — 71046 X-RAY EXAM CHEST 2 VIEWS: CPT

## 2020-07-23 PROCEDURE — 87070 CULTURE OTHR SPECIMN AEROBIC: CPT

## 2020-07-23 PROCEDURE — 85730 THROMBOPLASTIN TIME PARTIAL: CPT

## 2020-07-23 PROCEDURE — 81001 URINALYSIS AUTO W/SCOPE: CPT

## 2020-07-23 PROCEDURE — 86920 COMPATIBILITY TEST SPIN: CPT

## 2020-07-23 PROCEDURE — 87324 CLOSTRIDIUM AG IA: CPT

## 2020-07-23 PROCEDURE — 87305 ASPERGILLUS AG IA: CPT

## 2020-07-23 PROCEDURE — 74176 CT ABD & PELVIS W/O CONTRAST: CPT

## 2020-07-23 PROCEDURE — 80202 ASSAY OF VANCOMYCIN: CPT

## 2020-07-23 PROCEDURE — 85025 COMPLETE CBC W/AUTO DIFF WBC: CPT

## 2020-07-23 PROCEDURE — 80053 COMPREHEN METABOLIC PANEL: CPT

## 2020-07-23 RX ADMIN — ISODIUM CHLORIDE SCH MG: 0.03 SOLUTION RESPIRATORY (INHALATION) at 19:03

## 2020-07-23 RX ADMIN — ACYCLOVIR SCH MG: 200 CAPSULE ORAL at 20:32

## 2020-07-23 RX ADMIN — DICYCLOMINE HYDROCHLORIDE SCH MG: 20 TABLET ORAL at 20:32

## 2020-07-23 NOTE — ED
General Adult HPI





- General


Chief complaint: Recheck/Abnormal Lab/Rx


Stated complaint: Low hemoglobin


Time Seen by Provider: 20 11:55


Source: patient, RN notes reviewed, old records reviewed


Mode of arrival: ambulatory


Limitations: no limitations





- History of Present Illness


Initial comments: 





This is a 70-year-old male who presents to the emergency department with 

complaint of his hemoglobin is low.  Patient states he's had chemo about a week 

ago and ever since then he's been extremely tired and appears to be getting more

more tired.  Patient states she's week and a little bit short of breath when he 

exerts himself.  Patient states his primary medical care doctor called him today

and told him his hemoglobin was 5.9.  Patient denies any chest pain or 

palpitation.  Patient denies any abdominal pain patient denies nausea vomiting 

diarrhea.  Patient denies any black or bloody stools.  Patient states she has a 

history of similar.  Patient denies any chest pain.  Patient denies any recent 

fever chills or cough.





- Related Data


                                Home Medications











 Medication  Instructions  Recorded  Confirmed


 


Multivitamins, Thera [Multivitamin 1 tab PO DAILY 20





(formulary)]   








                                  Previous Rx's











 Medication  Instructions  Recorded


 


Acyclovir 400 mg PO TID #90 tablet 20


 


Fluconazole [Diflucan] 100 mg PO DAILY #30 tab 20


 


Acetaminophen Tab [Tylenol] 650 mg PO Q6HR PRN  tab 20


 


Albuterol Inhaler [Ventolin Hfa 2 puff INHALATION RT-TID #1 inhaler 20





Inhaler]  


 


Benzonatate [Tessalon Perles] 200 mg PO TID PRN #30 cap 20


 


Budesonide/Formoterol Fumarate 2 puff INHALATION BID #1 inhaler 20





[Symbicort 80-4.5 Mcg Inhaler]  


 


Cefuroxime Axetil [Ceftin] 500 mg PO BID 7 Days #14 tab 20


 


Dicyclomine [Bentyl] 20 mg PO QID #120 tab 20


 


Furosemide [Lasix] 40 mg PO DAILY #30 tablet 20


 


Potassium Chloride ER [K-Dur 20] 20 meq PO DAILY #30 tab 20


 


metroNIDAZOLE [Flagyl] 500 mg PO Q8HR #21 tab 20


 


predniSONE 0 mg PO AS DIRECTED #30 tab 20











                                    Allergies











Allergy/AdvReac Type Severity Reaction Status Date / Time


 


No Known Allergies Allergy   Verified 20 12:00














Review of Systems


ROS Statement: 


Those systems with pertinent positive or pertinent negative responses have been 

documented in the HPI.





ROS Other: All systems not noted in ROS Statement are negative.





Past Medical History


Past Medical History: Cancer, Skin Disorder


Additional Past Medical History / Comment(s): Gout, chronic anemia, bone marrow 

cancer


History of Any Multi-Drug Resistant Organisms: None Reported


Past Surgical History: Orthopedic Surgery


Additional Past Surgical History / Comment(s): surgery on rt leg and foot after 

injury age 3


Past Anesthesia/Blood Transfusion Reactions: No Reported Reaction


Past Psychological History: No Psychological Hx Reported


Smoking Status: Current every day smoker


Past Alcohol Use History: Occasional


Past Drug Use History: None Reported





- Past Family History


  ** Mother


Family Medical History: Cancer





  ** Brother(s)


Family Medical History: Cancer





  ** Father


Additional Family Medical History / Comment(s): Father  at age 93 from old 

age.





General Exam





- General Exam Comments


Initial Comments: 





GENERAL:


Patient is well-developed and well-nourished.  Patient is nontoxic and well-

hydrated and is in mild distress.





ENT:


Neck is soft and supple.  No significant lymphadenopathy is noted.  Oropharynx 

is clear.  Moist mucous membranes.  Neck has full range of motion without 

eliciting any pain.





EYES:


Pale conjunctiva.  Extraocular movements were intact and pupils were equal round

 and reactive to light.  Eyelids were unremarkable.





PULMONARY:


Unlabored respirations.  Good breath sounds bilaterally.  No audible rales 

rhonchi or wheezing was noted.





CARDIOVASCULAR:


There is a regular rate and rhythm without any murmurs gallops or rubs.  





ABDOMEN:


Soft and nontender with normal bowel sounds. 





SKIN:


Skin is clear with no lesions or rashes and otherwise unremarkable.





NEUROLOGIC:


Patient is alert and oriented x3.  Cranial nerves II through XII are grossly 

intact.  Motor and sensory are also intact.  Normal speech, volume and content. 

 Symmetrical smile.  





MUSCULOSKELETAL:


Normal extremities with adequate strength and full range of motion.  No lower 

extremity swelling or edema.  No calf tenderness.





LYMPHATICS:


No significant lymphadenopathy is noted





PSYCHIATRIC:


Normal psychiatric evaluation. 


Limitations: no limitations





Course


                                   Vital Signs











  20





  11:55


 


Temperature 98.3 F


 


Pulse Rate 75


 


Respiratory 18





Rate 


 


Blood Pressure 101/65


 


O2 Sat by Pulse 98





Oximetry 














Medical Decision Making





- Medical Decision Making





Patient received one unit of packed red blood cells.





I spoke with Dr. Mendoza he agreed to admit the patient admitted the patient wrote

 admitting orders.





Critical Care Time


Critical Care Time: Yes


Total Critical Care Time: 35





Disposition


Clinical Impression: 


 Anemia





Disposition: ADMITTED AS IP TO THIS HOSP


Referrals: 


Garcia Luu DO [Primary Care Provider] - 1-2 days


Time of Disposition: 12:39

## 2020-07-23 NOTE — P.HPIM
History of Present Illness


H&P Date: 20


Chief Complaint: Severe anemia, pancytopenia, myelodysplasia, recurrent pneu

monia, severe ti





70-year-old male one of Dr. Chávez's patient with past medical history of 

anemia, gout, tobacco use, daily alcohol use and myelodysplasia who has been set

pancytopenic for the last 2 month was started on chemotherapy with hematology 

recently was hospitalized for extended period of time for severe pancytopenia 

and severe neutropenia did not recover.  Patient apparently went back on 

chemotherapy after he left the hospital last time.  Today found to have severe 

anemia with hemoglobin of 5.1 with severe neutropenia white blood cell 1.3 and 

thrombocytopenia with platelets 28,000 only.  Patient brought to [facility] 

department where was seen and evaluated has been very weak has not been able to 

ambulate and walk and has been having worsening shortness of breath with dyspnea

with minimal exertion for the last few days.  With a current hemoglobin his 

oncologist and instructed him to come to the hospital for admission for 

transfusion.





Review of Systems





CONSTITUTIONAL: Well-developed no acute respiratory distress.


EYES: No icterus sclerae, no conjunctivitis.


EARS, NOSE, MOUTH, THROAT, and FACE: No sore throat, lymphadenopathy, carotid 

bruits or deformity.


RESPIRATORY: Positive shortness of breath cough wheezes.


CARDIOVASCULAR: No CP, Palpitation, PND, Orthopnea, or angina.


GASTROINTESTINAL: Mild change in bowel habit with mild diarrhea and slight 

dyspepsia


GENITOURINARY: Negative for Hematuria or UTI, no kidney stones.


INTEGUMENT/BREAST: Negative for any muscular injury with mild osteoarthritis..


HEMATOLOGIC/LYMPHATIC: History of myelodysplasia severe anemia thrombocytopenia 

and neutropenia.


MUSCULOSKELTAL: Negative for Myalgia or arthralgia.


NEURLOGICAL: No LOC, Sz or syncope, blurred vision dizziness or abnormality..


BEHAVIORAL/PSYCH: Negative.


ENDOCRINE: Negative.





Past Medical History


Past Medical History: Blood Disorder, Cancer, Pneumonia, Skin Disorder


Additional Past Medical History / Comment(s): Pt recently admitted to [facility] on 

20 with neutropenic fever/sepsis with possible abdominal source/poorly 

marginated R upper lobe infiltrate/epigastric pain/severe 

malnutrition/exacerbation COPD.     Other hx:  MDS with ringed sideroblast-last 

chemo received M-F last week, pancytopenia, chronic anemia with transfusions, 

bilateral feet edema,


History of Any Multi-Drug Resistant Organisms: None Reported


Past Surgical History: Orthopedic Surgery


Additional Past Surgical History / Comment(s): BMB, colonoscopy, surgery on rt 

leg and foot after injury age 3


Past Anesthesia/Blood Transfusion Reactions: No Reported Reaction


Past Psychological History: No Psychological Hx Reported


Additional Psychological History / Comment(s): Pt resides with his spouse.  He 

uses no assistive device.  He drives.


Smoking Status: Current every day smoker


Past Alcohol Use History: Occasional


Additional Past Alcohol Use History / Comment(s): Patient started smoking in 

1955 and is down to 1/2 ppd.  Patient used to drink 4-6 beers per day for many 

years and cut down to 1-2 beers per day for the past 2 months.


Past Drug Use History: None Reported





- Past Family History


  ** Mother


Family Medical History: Cancer


Additional Family Medical History / Comment(s): Cervical cancer.





  ** Brother(s)


Family Medical History: Cancer


Additional Family Medical History / Comment(s): Pancreatic cancer with mets.





  ** Father


Additional Family Medical History / Comment(s): Father  at age 93 from old 

age.





Medications and Allergies


                                Home Medications











 Medication  Instructions  Recorded  Confirmed  Type


 


Acyclovir 400 mg PO TID #90 tablet 20 Rx


 


Fluconazole [Diflucan] 100 mg PO DAILY #30 tab 20 Rx


 


Acetaminophen Tab [Tylenol] 650 mg PO Q6HR PRN  tab 20 Rx


 


Multivitamins, Thera [Multivitamin 1 tab PO DAILY 20 History





(formulary)]    


 


Albuterol Inhaler [Ventolin Hfa 2 puff INHALATION RT-TID #1 inhaler 20 Rx





Inhaler]    


 


Dicyclomine [Bentyl] 20 mg PO QID #120 tab 20 Rx


 


Furosemide [Lasix] 40 mg PO DAILY #30 tablet 20 Rx


 


Potassium Chloride ER [K-Dur 20] 20 meq PO DAILY #30 tab 20 Rx








                                    Allergies











Allergy/AdvReac Type Severity Reaction Status Date / Time


 


No Known Allergies Allergy   Verified 20 13:26














Physical Exam


Vitals: 


                                   Vital Signs











  Temp Pulse Pulse Resp BP BP Pulse Ox


 


 20 18:19  98.4 F  65   18  127/68   98


 


 20 18:03  98.0 F  64   18  126/70   97


 


 20 16:35  98.0 F  64   20  118/67   98


 


 20 16:05  98.1 F  61   18  127/68  


 


 20 16:00     18   


 


 20 15:55  98.1 F  62   18  122/67   95


 


 20 15:45  98.2 F  66   18  127/77  


 


 20 15:00  98.2 F   67  18   126/70  97


 


 20 14:47  98.0 F  62   16  128/69   98


 


 20 13:39   66   16  110/64   97


 


 20 12:58     16   


 


 20 11:55  98.3 F  75   18  101/65   98








                                Intake and Output











 20





 06:59 14:59 22:59


 


Intake Total  160 310


 


Balance  160 310


 


Intake:   


 


  Oral  160 


 


  Blood Product   310


 


    Rc Irr As1  Unit   310





    [id number]   


 


Other:   


 


  # Voids  3 


 


  Weight  55.792 kg 














General Appearance: Alert, cooperative, no distress, appears stated age.


Neck HEENT: Supple, no lymphadenopathy, no thyroid enlargement, no carotid 

bruits.


Lungs: Decreased breaths on bilaterally with fine rhonchi positive mild 

expiratory wheezes.


Chest Wall: Decrease expansion with deep inspiration no tenderness and no 

deformity was found on exam, no costochondral pain or discomfort.


Heart: Regular rate and rhythm, S1, S2 mild tachycardia with systolic murmur.


Back: Symmetric, no curvature, ROM normal, no CVA tenderness.


Abdomen: Soft positive bowel sound slight discomfort in the mid abdominal area 

with mild organomegaly with multiple bruises no rebound or rigidity.


Extremities: Extremities trace edema with multiple bruises positive mild 

ecchymosis as well.


Pulses: 2+ and symmetric.


Skin: Skin color, texture, tugor normal, no rashes or lesions.


Neurologic: Alert oriented x3 cranial nerves II through XII intact, no motor 

deficit, no abnormal balance or gait.





Results


CBC & Chem 7: 


                                 20 13:39





                                 20 13:39


Labs: 


                  Abnormal Lab Results - Last 24 Hours (Table)











  20 Range/Units





  13:39 13:39 13:39 


 


WBC  1.3 L*    (3.8-10.6)  k/uL


 


RBC  1.52 L    (4.30-5.90)  m/uL


 


Hgb  5.1 L* D    (13.0-17.5)  gm/dL


 


Hct  16.3 L*    (39.0-53.0)  %


 


MCV  106.6 H D    (80.0-100.0)  fL


 


RDW  28.1 H    (11.5-15.5)  %


 


Plt Count  28 L D    (150-450)  k/uL


 


Neutrophils #  0.2 L*    (1.3-7.7)  k/uL


 


Lymphocytes #  0.9 L    (1.0-4.8)  k/uL


 


Macrocytosis  Marked A    


 


APTT     (22.0-30.0)  sec


 


Sodium   136 L   (137-145)  mmol/L


 


BUN   41 H   (9-20)  mg/dL


 


Calcium   8.3 L   (8.4-10.2)  mg/dL


 


Albumin   3.1 L   (3.5-5.0)  g/dL


 


Crossmatch    See Detail  














  20 Range/Units





  13:39 


 


WBC   (3.8-10.6)  k/uL


 


RBC   (4.30-5.90)  m/uL


 


Hgb   (13.0-17.5)  gm/dL


 


Hct   (39.0-53.0)  %


 


MCV   (80.0-100.0)  fL


 


RDW   (11.5-15.5)  %


 


Plt Count   (150-450)  k/uL


 


Neutrophils #   (1.3-7.7)  k/uL


 


Lymphocytes #   (1.0-4.8)  k/uL


 


Macrocytosis   


 


APTT  21.9 L  (22.0-30.0)  sec


 


Sodium   (137-145)  mmol/L


 


BUN   (9-20)  mg/dL


 


Calcium   (8.4-10.2)  mg/dL


 


Albumin   (3.5-5.0)  g/dL


 


Crossmatch   














Thrombosis Risk Factor Assmnt





- DVT/VTE Prophylaxis


DVT/VTE Prophylaxis: Mechanical Prophylaxis ordered





- Choose All That Apply


Any of the Below Risk Factors Present?: Yes


Each Factor Represents 1 point: Abnormal pulmonary function (COPD), Sepsis (< 

1month)


Other Risk Factors: Yes


Each Risk Factor Represents 2 Points: Age 61-74 years, Malignancy


Other congenital or acquired thrombophilia - If yes, enter type in comment: No


Thrombosis Risk Factor Assessment Total Risk Factor Score: 6


Thrombosis Risk Factor Assessment Level: High Risk





Assessment and Plan


Assessment: 





1 acute symptomatic anemia: Patient will have 2 units of blood transfusion 

consult hematology iron infusion and possible Procrit might be recommended.





2 severe myelodysplasia: Has been on chemotherapy and seen hematology regular 

basis we'll consult hematology.





3 severe neutropenia: Patient to be started on Neulasta daily until the white 

blood cell is elevated.  In the meanwhile patient will go back on antibiotics he

was on site [facility], Diflucan and probably need to stay on cefepime.





4 COPD: Patient can be started on bedtime agonist inhaler or nebulizer.





5 chronic edema: More diastolic congestive heart failure and chronic continue 

furosemide at 40 mg daily.





6 recurrent pneumonia: Patient is asymptomatic at this point.  Continue inhaler 

and antibiotics.





7 GI prophylaxis: Will add pantoprazole 40 mg daily.





8 DVT prophylaxis: Patient will stay on Venodyne boots and knee-high HAKAN hose.





CODE STATUS: Full code.





Admit patient to inpatient service for more than 2 night stay.

## 2020-07-24 LAB
ALBUMIN SERPL-MCNC: 2.7 G/DL (ref 3.5–5)
ALP SERPL-CCNC: 66 U/L (ref 38–126)
ALT SERPL-CCNC: 40 U/L (ref 4–49)
ANION GAP SERPL CALC-SCNC: 6 MMOL/L
AST SERPL-CCNC: 42 U/L (ref 17–59)
BUN SERPL-SCNC: 32 MG/DL (ref 9–20)
CALCIUM SPEC-MCNC: 7.9 MG/DL (ref 8.4–10.2)
CHLORIDE SERPL-SCNC: 103 MMOL/L (ref 98–107)
CO2 SERPL-SCNC: 29 MMOL/L (ref 22–30)
ERYTHROCYTE [DISTWIDTH] IN BLOOD BY AUTOMATED COUNT: 2.21 M/UL (ref 4.3–5.9)
ERYTHROCYTE [DISTWIDTH] IN BLOOD: 25.6 % (ref 11.5–15.5)
GLUCOSE SERPL-MCNC: 97 MG/DL (ref 74–99)
HCT VFR BLD AUTO: 22.2 % (ref 39–53)
HGB BLD-MCNC: 7.3 GM/DL (ref 13–17.5)
MCH RBC QN AUTO: 33.2 PG (ref 25–35)
MCHC RBC AUTO-ENTMCNC: 33.2 G/DL (ref 31–37)
MCV RBC AUTO: 100.2 FL (ref 80–100)
PLATELET # BLD AUTO: 18 K/UL (ref 150–450)
POTASSIUM SERPL-SCNC: 4.2 MMOL/L (ref 3.5–5.1)
PROT SERPL-MCNC: 6 G/DL (ref 6.3–8.2)
SODIUM SERPL-SCNC: 138 MMOL/L (ref 137–145)
WBC # BLD AUTO: 0.9 K/UL (ref 3.8–10.6)

## 2020-07-24 RX ADMIN — PANTOPRAZOLE SODIUM SCH MG: 40 TABLET, DELAYED RELEASE ORAL at 08:17

## 2020-07-24 RX ADMIN — THERA TABS SCH EACH: TAB at 08:17

## 2020-07-24 RX ADMIN — CEFEPIME HYDROCHLORIDE SCH MLS/HR: 2 INJECTION, POWDER, FOR SOLUTION INTRAVENOUS at 17:32

## 2020-07-24 RX ADMIN — FUROSEMIDE SCH MG: 40 TABLET ORAL at 08:17

## 2020-07-24 RX ADMIN — ACYCLOVIR SCH MG: 200 CAPSULE ORAL at 17:33

## 2020-07-24 RX ADMIN — DICYCLOMINE HYDROCHLORIDE SCH MG: 20 TABLET ORAL at 21:56

## 2020-07-24 RX ADMIN — ISODIUM CHLORIDE SCH MG: 0.03 SOLUTION RESPIRATORY (INHALATION) at 19:53

## 2020-07-24 RX ADMIN — ISODIUM CHLORIDE SCH MG: 0.03 SOLUTION RESPIRATORY (INHALATION) at 08:56

## 2020-07-24 RX ADMIN — LEVOFLOXACIN SCH MG: 500 TABLET, FILM COATED ORAL at 21:56

## 2020-07-24 RX ADMIN — CEFEPIME HYDROCHLORIDE SCH MLS/HR: 2 INJECTION, POWDER, FOR SOLUTION INTRAVENOUS at 00:43

## 2020-07-24 RX ADMIN — NYSTATIN SCH UNIT: 100000 SUSPENSION ORAL at 22:30

## 2020-07-24 RX ADMIN — ACYCLOVIR SCH MG: 200 CAPSULE ORAL at 08:17

## 2020-07-24 RX ADMIN — DICYCLOMINE HYDROCHLORIDE SCH MG: 20 TABLET ORAL at 08:17

## 2020-07-24 RX ADMIN — ISODIUM CHLORIDE SCH MG: 0.03 SOLUTION RESPIRATORY (INHALATION) at 11:31

## 2020-07-24 RX ADMIN — CEFEPIME HYDROCHLORIDE SCH MLS/HR: 2 INJECTION, POWDER, FOR SOLUTION INTRAVENOUS at 08:17

## 2020-07-24 RX ADMIN — ACYCLOVIR SCH MG: 200 CAPSULE ORAL at 21:56

## 2020-07-24 RX ADMIN — POTASSIUM CHLORIDE SCH MEQ: 20 TABLET, EXTENDED RELEASE ORAL at 08:17

## 2020-07-24 RX ADMIN — DICYCLOMINE HYDROCHLORIDE SCH MG: 20 TABLET ORAL at 13:27

## 2020-07-24 RX ADMIN — DICYCLOMINE HYDROCHLORIDE SCH MG: 20 TABLET ORAL at 17:33

## 2020-07-24 NOTE — P.PN
Subjective


Progress Note Date: 07/24/20








70-year-old male one of Dr. Luu's patient with past medical history of 

anemia, gout, tobacco use, daily alcohol use and myelodysplasia who has been set

pancytopenic for the last 2 month was started on chemotherapy with hematology 

recently was hospitalized for extended period of time for severe pancytopenia 

and severe neutropenia did not recover.  Patient apparently went back on 

chemotherapy after he left the hospital last time.  Today found to have severe 

anemia with hemoglobin of 5.1 with severe neutropenia white blood cell 1.3 and 

thrombocytopenia with platelets 28,000 only.  Patient brought to the [facility] emergency department where was seen and evaluated has been 

very weak has not been able to ambulate and walk and has been having worsening 

shortness of breath with dyspnea with minimal exertion for the last few days.  

With a current hemoglobin his oncologist and instructed him to come to the 

hospital for admission for transfusion.





7/24: Patient is seen today on the [facility] floor.  He is status post 2 units of 

packed RBCs and repeat lab work reveals hemoglobin of 7.3, WBC 0.9, platelet 

count 18.  Electrolytes normal, BUN 32 and creatinine 0.89.  Calcium 7.9, total 

bilirubin 1.5, AST 42, ALT 40, alkaline phosphatase 66.  Temperature max 100.0, 

heart rate 76, blood pressure 115/66, pulse ox 96% on room air.  Consult in 

place for oncology.  COVID-19 is pending.





Review of Systems


CONSTITUTIONAL: Well-developed no acute respiratory distress.  Denies chills


EYES: No icterus sclerae, no conjunctivitis.


EARS, NOSE, MOUTH, THROAT, and FACE: No sore throat, lymphadenopathy, carotid 

bruits or deformity.


RESPIRATORY: Positive shortness of breath cough wheezes.


CARDIOVASCULAR: No CP, Palpitation, PND, Orthopnea, or angina.


GASTROINTESTINAL: Mild change in bowel habit with mild diarrhea and slight 

dyspepsia


GENITOURINARY: Negative for Hematuria or UTI, no kidney stones.


INTEGUMENT/BREAST: Negative for any muscular injury with mild osteoarthritis..


HEMATOLOGIC/LYMPHATIC: History of myelodysplasia severe anemia thrombocytopenia 

and neutropenia.


MUSCULOSKELTAL: Negative for Myalgia or arthralgia.


NEURLOGICAL: No LOC, Sz or syncope, blurred vision dizziness or abnormality..


BEHAVIORAL/PSYCH: Negative.


ENDOCRINE: Negative.





Physical Examination


General Appearance: Alert, cooperative, no distress, appears stated age.  

Patient sitting on the edge of the bed and appears to be in no acute distress.


Neck HEENT: Supple, no lymphadenopathy, no thyroid enlargement, no carotid 

bruits.


Lungs: Decreased breaths on bilaterally with fine rhonchi positive mild 

expiratory wheezes.


Chest Wall: Decrease expansion with deep inspiration no tenderness and no 

deformity was found on exam, no costochondral pain or discomfort.


Heart: Regular rate and rhythm, S1, S2 mild tachycardia with systolic murmur.


Back: Symmetric, no curvature, ROM normal, no CVA tenderness.


Abdomen: Soft positive bowel sound slight discomfort in the mid abdominal area 

with mild organomegaly with multiple bruises no rebound or rigidity.


Extremities: Extremities trace edema with multiple bruises positive mild 

ecchymosis as well.


Pulses: 2+ and symmetric.


Skin: Skin color, texture, tugor normal, no rashes or lesions.


Neurologic: Alert oriented x3 cranial nerves II through XII intact, no motor 

deficit, no abnormal balance or gait.








Assessment and Plan


1 acute symptomatic anemia.  Patient is status post transfusion of 2 units 

packed RBCs.  Continue Protonix.





2 severe myelodysplasia: Has been on chemotherapy and seen hematology regular 

basis we'll consult hematology.  Continue cefepime.





3 severe neutropenia.  Oncology consult, expect oncology to start Neulasta daily

until the white blood cell is elevated.  In the meanwhile patient will go back 

on acyclovir, Diflucan and probably need to stay on cefepime.





4 COPD without exacerbation.  Continue albuterol nebulizer 3 times daily.  





5 chronic edema: More diastolic congestive heart failure and chronic continue 

furosemide at 40 mg daily.





6 recurrent pneumonia: Patient is asymptomatic at this point.  Continue inhaler 

and antibiotics.





7 GI prophylaxis: pantoprazole 40 mg daily.





8 DVT prophylaxis: Patient will stay on Venodyne boots and knee-high HAKAN hose.





CODE STATUS: Full code.





Discharge plan: [facility] care and palliative care.





Impression and plan of care have been directed as dictated by the signing 

physician.  Brittney Bennett nurse practitioner acting as scribe for signing 

physician.





Objective





- Vital Signs


Vital signs: 


                                   Vital Signs











Temp  100.0 F H  07/24/20 05:00


 


Pulse  67   07/24/20 05:00


 


Resp  18   07/24/20 05:00


 


BP  132/67   07/24/20 05:00


 


Pulse Ox  96   07/24/20 05:00








                                 Intake & Output











 07/23/20 07/24/20 07/24/20





 18:59 06:59 18:59


 


Intake Total 470 1210 


 


Balance 470 1210 


 


Weight 55.792 kg  


 


Intake:   


 


  Intake, IV Titration  100 





  Amount   


 


    Cefepime 2 gm In Sodium  100 





    Chloride 0.9% 100 ml @   





    200 mls/hr IVPB Q8HR [facility]   





    Rx#:[id number]   


 


  Oral 160 800 


 


  Blood Product 310 310 


 


    Rc Irr As1  Unit 0 310 





    [id number]   


 


    Rc Irr As1  Unit 310  





    [id number]   


 


Other:   


 


  # Voids 3 1 














- Labs


CBC & Chem 7: 


                                 07/24/20 06:50





                                 07/24/20 06:50


Labs: 


                  Abnormal Lab Results - Last 24 Hours (Table)











  07/23/20 07/23/20 07/23/20 Range/Units





  13:39 13:39 13:39 


 


WBC  1.3 L*    (3.8-10.6)  k/uL


 


RBC  1.52 L    (4.30-5.90)  m/uL


 


Hgb  5.1 L* D    (13.0-17.5)  gm/dL


 


Hct  16.3 L*    (39.0-53.0)  %


 


MCV  106.6 H D    (80.0-100.0)  fL


 


RDW  28.1 H    (11.5-15.5)  %


 


Plt Count  28 L D    (150-450)  k/uL


 


Neutrophils #  0.2 L*    (1.3-7.7)  k/uL


 


Lymphocytes #  0.9 L    (1.0-4.8)  k/uL


 


Macrocytosis  Marked A    


 


APTT     (22.0-30.0)  sec


 


Sodium   136 L   (137-145)  mmol/L


 


BUN   41 H   (9-20)  mg/dL


 


Calcium   8.3 L   (8.4-10.2)  mg/dL


 


Total Bilirubin     (0.2-1.3)  mg/dL


 


Total Protein     (6.3-8.2)  g/dL


 


Albumin   3.1 L   (3.5-5.0)  g/dL


 


Crossmatch    See Detail  














  07/23/20 07/24/20 Range/Units





  13:39 06:50 


 


WBC    (3.8-10.6)  k/uL


 


RBC    (4.30-5.90)  m/uL


 


Hgb    (13.0-17.5)  gm/dL


 


Hct    (39.0-53.0)  %


 


MCV    (80.0-100.0)  fL


 


RDW    (11.5-15.5)  %


 


Plt Count    (150-450)  k/uL


 


Neutrophils #    (1.3-7.7)  k/uL


 


Lymphocytes #    (1.0-4.8)  k/uL


 


Macrocytosis    


 


APTT  21.9 L   (22.0-30.0)  sec


 


Sodium    (137-145)  mmol/L


 


BUN   32 H  (9-20)  mg/dL


 


Calcium   7.9 L  (8.4-10.2)  mg/dL


 


Total Bilirubin   1.5 H  (0.2-1.3)  mg/dL


 


Total Protein   6.0 L  (6.3-8.2)  g/dL


 


Albumin   2.7 L  (3.5-5.0)  g/dL


 


Crossmatch

## 2020-07-24 NOTE — P.CONS
<Thelma Peraza - Last Filed: 20 20:16>





History of Present Illness





- Reason for Consult


Consult date: 20


Pancytopenia


Requesting physician: Anupam Hou





- Chief Complaint


Symptomatic Anemia





- History of Present Illness





Mr Newberry is a pleasant WM, referred here for progressive anemia, and 

persistently high MCV. Labs from  revealed a Hgb of 13.3, with .4. 

CRP was mildly elevated at 1.37.On 16, Hgb was 8.6, with .5. Ferritin

was 1053, but saturation was normal at 27%. TIBC was low at 246. CRP was higher 

at 3.02. Total globulin was elevated at 4.1. Other aspects of his CBC and CMP 

were normal.


  He denied any prior h/o blood problems, or transfusions, or any ongoing 

chronic inflammatory condition.


  Additional labs were ordered, which were negative, other than a mild elevation

of light chains, with normal ratio. Repeat iron studies again showed no evidence

of hemochromatosis.


  He had a bone marrow on 16/. Prelim report, per my discussion with 

Pathology indicated erythroid hyperplasia, with some megaloblastoid changes, and

increased ring sideroblasts. Flow indicated an aberrant population of blasts ( 

1%). This is most indicative of MDS.


  He denied any f/c/n/v/LAD/joint swelling/obvious inflammation. His ROS is 

otherwise as per HPI and negative out of 10.








On 20: the patient was referred back here as a new consult.  At the time of

his initial evaluation, on discussion of the pathology, he had indicated that he

did not want any treatment especially chemotherapy.  As hemoglobin was still in 

a safe range, it was recommended that the patient be followed with observation 

at the time.  However he canceled his scheduled appointment in  did not 

follow-up in the office subsequently.


  According to physician notes available the patient remained quite noncompliant

and erratic in follow-up with his PCP.  He had presented in 3/20 with complains 

of progressive fatigue, shortness of breath on exertion, and dizziness over the 

past several weeks.  He was found to have a hemoglobin of 6.7 with MCV 99, 

platelets 193 and WBC 4.5 with ANC 1.2 and ALC 2.1.GFR was essentially normal, 

along with other liver enzymes.  Uric acid was elevated at 8.2.  C-reactive 

protein was 9.5.  His iron saturation was 50% with ferritin 930.


  the patient was admitted to [facility] on 3/19/20 and 

received blood transfusion.  Hemoglobin improvement to the 8 range any was 

subsequently discharged.  He also had a flow cytometry done by his PCP around 

this time which was negative other than some neutropenia.


  the patient had a bone marrow aspiration biopsy repeated on 20.  This 

revealed similar changes with erythroid hyperplasia, and ring sideroblasts 

indicative of MDS.  There was no evidence of transformation to acute leukemia.





  The patient had repeat bone marrow aspiration biopsy with results as noted 

above. He did agree to starting on Dacogen and tolerated well


He continues with SOB (continues to smoke). He presented to emergency with he

moglobin of 5.3. He was transfused and hemoglobin has recovered to safe range. 





Review of Systems





A 14 point review of systems assessed and completed and all negative except HPI





Past Medical History


Past Medical History: Blood Disorder, Cancer, Pneumonia, Skin Disorder


Additional Past Medical History / Comment(s): Pt recently admitted to [facility] on 

20 with neutropenic fever/sepsis with possible abdominal source/poorly 

marginated R upper lobe infiltrate/epigastric pain/severe 

malnutrition/exacerbation COPD.     Other hx:  MDS with ringed sideroblast-last 

chemo received M-F last week, pancytopenia, chronic anemia with transfusions, 

bilateral feet edema,


History of Any Multi-Drug Resistant Organisms: None Reported


Past Surgical History: Orthopedic Surgery


Additional Past Surgical History / Comment(s): BMB, colonoscopy, surgery on rt 

leg and foot after injury age 3


Past Anesthesia/Blood Transfusion Reactions: No Reported Reaction


Past Psychological History: No Psychological Hx Reported


Additional Psychological History / Comment(s): Pt resides with his spouse.  He 

uses no assistive device.  He drives.


Smoking Status: Current every day smoker


Past Alcohol Use History: Occasional


Additional Past Alcohol Use History / Comment(s): Patient started smoking in 

1955 and is down to 1/2 ppd.  Patient used to drink 4-6 beers per day for many 

years and cut down to 1-2 beers per day for the past 2 months.


Past Drug Use History: None Reported





- Past Family History


  ** Mother


Family Medical History: Cancer


Additional Family Medical History / Comment(s): Cervical cancer.





  ** Brother(s)


Family Medical History: Cancer


Additional Family Medical History / Comment(s): Pancreatic cancer with mets.





  ** Father


Additional Family Medical History / Comment(s): Father  at age 93 from old 

age.





Medications and Allergies


                                Home Medications











 Medication  Instructions  Recorded  Confirmed  Type


 


Acyclovir 400 mg PO TID #90 tablet 20 Rx


 


Fluconazole [Diflucan] 100 mg PO DAILY #30 tab 20 Rx


 


Acetaminophen Tab [Tylenol] 650 mg PO Q6HR PRN  tab 20 Rx


 


Multivitamins, Thera [Multivitamin 1 tab PO DAILY 20 History





(formulary)]    


 


Albuterol Inhaler [Ventolin Hfa 2 puff INHALATION RT-TID #1 inhaler 20 Rx





Inhaler]    


 


Dicyclomine [Bentyl] 20 mg PO QID #120 tab 20 Rx


 


Furosemide [Lasix] 40 mg PO DAILY #30 tablet 20 Rx


 


Potassium Chloride ER [K-Dur 20] 20 meq PO DAILY #30 tab 20 Rx








                                    Allergies











Allergy/AdvReac Type Severity Reaction Status Date / Time


 


No Known Allergies Allergy   Verified 20 13:26














Physical Exam


Vitals: 


                                   Vital Signs











  Temp Pulse Pulse Resp BP BP Pulse Ox


 


 20 11:39   72     


 


 20 11:31   72     


 


 20 11:23  98.8 F   68  18   115/66  96


 


 20 09:07   76     


 


 20 08:57   72     


 


 20 05:00  100.0 F H   67  18   132/67  96


 


 20 22:00  99.5 F  66   16  134/71  


 


 20 20:00  98.5 F   64  18   126/65  100


 


 20 19:11   72     


 


 20 19:06  98.6 F  64   16  127/68  


 


 20 19:05   70     


 


 20 19:00  98.4 F   64  16   127/68  100


 


 20 18:45  98.3 F  67   18  120/67  


 


 20 18:36  98.1 F  65   18  120/67   98


 


 20 18:26  98.1 F  62    122/68   98


 


 20 18:19  98.4 F  65   18  127/68   98


 


 20 18:03  98.0 F  64   18  126/70   97


 


 20 16:35  98.0 F  64   20  118/67   98


 


 20 16:05  98.1 F  61   18  127/68  


 


 20 16:00     18   


 


 20 15:55  98.1 F  62   18  122/67   95


 


 20 15:45  98.2 F  66   18  127/77  








                                Intake and Output











 20





 06:59 14:59 22:59


 


Intake Total 600 500 


 


Balance 600 500 


 


Intake:   


 


  Intake, IV Titration 100  





  Amount   


 


    Cefepime 2 gm In Sodium 100  





    Chloride 0.9% 100 ml @   





    200 mls/hr IVPB Q8HR [facility]   





    Rx#:[id number]   


 


  Oral 500 500 


 


Other:   


 


  # Voids  1 


 


  Weight  55.792 kg 














- Constitutional


General appearance: cooperative, no acute distress





- EENT


Eyes: EOMI, PERRLA


ENT: NA/AT, normal oropharynx





- Respiratory


Respiratory: bilateral: diminished (no increased effort)





- Cardiovascular


Rhythm: regular


Heart sounds: normal: S1, S2





- Gastrointestinal


General gastrointestinal: normal bowel sounds, soft





- Integumentary


Integumentary: pale





- Neurologic





non-focal





- Musculoskeletal


Musculoskeletal: generalized weakness, strength equal bilaterally





- Psychiatric


Psychiatric: A&O x's 3, appropriate affect, intact judgment & insight





Results


CBC & Chem 7: 


                                 20 06:50





                                 20 06:50


Labs: 


                  Abnormal Lab Results - Last 24 Hours (Table)











  20 Range/Units





  13:39 06:50 06:50 


 


WBC   0.9 L*   (3.8-10.6)  k/uL


 


RBC   2.21 L   (4.30-5.90)  m/uL


 


Hgb   7.3 L D   (13.0-17.5)  gm/dL


 


Hct   22.2 L   (39.0-53.0)  %


 


MCV   100.2 H D   (80.0-100.0)  fL


 


RDW   25.6 H   (11.5-15.5)  %


 


Plt Count   18 L*   (150-450)  k/uL


 


BUN    32 H  (9-20)  mg/dL


 


Calcium    7.9 L  (8.4-10.2)  mg/dL


 


Total Bilirubin    1.5 H  (0.2-1.3)  mg/dL


 


Total Protein    6.0 L  (6.3-8.2)  g/dL


 


Albumin    2.7 L  (3.5-5.0)  g/dL


 


Crossmatch  See Detail    














Assessment and Plan


(1) MDS (myelodysplastic syndrome), high grade


Current Visit: No   Status: Acute   Priority: High   Code(s): D46.Z - OTHER 

MYELODYSPLASTIC SYNDROMES   SNOMED Code(s): 987211493


   


Plan: 





Assessment and Recs:





MDS:





Severe symptomatic Anemia:


 - Recovered nicely after transfusions and patient is feeling better. 


 - He can follow-up with Dr. Callahan regarding continuation of treatment as an 

outpatient


 - Recommend close CBC as outpatient


 - Transfusion with hemoglobin less than 7, platlets less than 10K





Febrile Neutropenia:


 - Temp increased this am, possibly yfrom transfusion but with neutropenia pan 

cultures and chest xray warranted


 - Levaquin prophylaxis





Physician Attest: I have completed the full history and physical and agree with 

above dictation dictated as a scribe. 





<Eliezer Dos Santos - Last Filed: 20 09:49>





Physical Exam


Vitals: 


                                   Vital Signs











  Temp Pulse Pulse Resp BP Pulse Ox


 


 20 09:14   72    


 


 20 09:04   72    


 


 20 05:00  99.8 F H   75  18  133/77  98


 


 20 21:00  99.7 F H   73  18  105/63  97


 


 20 20:02   72    


 


 20 19:53   68    


 


 20 11:39   72    


 


 20 11:31   72    


 


 20 11:23  98.8 F   68  18  115/66  96








                                Intake and Output











 20





 22:59 06:59 14:59


 


Intake Total 500  


 


Balance 500  


 


Intake:   


 


  Oral 500  


 


Other:   


 


  Voiding Method  Toilet Toilet





  Urinal Urinal














Results


CBC & Chem 7: 


                                 20 08:44





                                 20 08:44


Labs: 


                  Abnormal Lab Results - Last 24 Hours (Table)











  20 Range/Units





  22:22 08:44 08:44 


 


WBC   0.7 L*   (3.8-10.6)  k/uL


 


RBC   2.04 L   (4.30-5.90)  m/uL


 


Hgb   7.1 L   (13.0-17.5)  gm/dL


 


Hct   20.7 L   (39.0-53.0)  %


 


MCV   101.7 H   (80.0-100.0)  fL


 


RDW   24.6 H   (11.5-15.5)  %


 


Plt Count   16 L*   (150-450)  k/uL


 


BUN    26 H  (9-20)  mg/dL


 


Glucose    111 H  (74-99)  mg/dL


 


Calcium    7.9 L  (8.4-10.2)  mg/dL


 


Total Protein    6.1 L  (6.3-8.2)  g/dL


 


Albumin    2.8 L  (3.5-5.0)  g/dL


 


Urine Protein  Trace H    (Negative)  


 


Urine Ketones  Trace H    (Negative)  


 


Urine Blood  Moderate H    (Negative)  


 


Urine RBC  7 H    (0-5)  /hpf


 


Hyaline Casts  4 H    (0-2)  /lpf


 


Urine Mucus  Rare H    (None)  /hpf














Assessment and Plan


Plan: 





  Pt did have good recovery after C 1 with GCSFof his WBC. Plt had also 

improved. This is possibly indicative of response





Now with pancytopenia after C 2, which is expected. Has received Neulasta. Treat

with supportive transfusions to keep Hgb > 7 and plt > 10


Low grade temp < 100.4. If no fevers, and infection w/u negative, can be d/c'ed 

from our standpoint. Will continue monitoring and supportive transfusions in the

office

## 2020-07-24 NOTE — XR
EXAMINATION TYPE: XR chest 2V

 

DATE OF EXAM: 7/24/2020

 

COMPARISON: 7/4/2020

 

HISTORY: Fever

 

TECHNIQUE:

 

FINDINGS: Heart is normal. Lungs are clear of infiltrate. There is no heart failure. Costophrenic ang
les are clear. There are no hilar masses. Bony thorax is intact.

 

IMPRESSION: No active cardiopulmonary disease. There is clearing of the pulmonary edema and pleural f
luid compared to old exam.

## 2020-07-25 LAB
ALBUMIN SERPL-MCNC: 2.8 G/DL (ref 3.5–5)
ALP SERPL-CCNC: 57 U/L (ref 38–126)
ALT SERPL-CCNC: 42 U/L (ref 4–49)
ANION GAP SERPL CALC-SCNC: 6 MMOL/L
AST SERPL-CCNC: 44 U/L (ref 17–59)
BUN SERPL-SCNC: 26 MG/DL (ref 9–20)
CALCIUM SPEC-MCNC: 7.9 MG/DL (ref 8.4–10.2)
CHLORIDE SERPL-SCNC: 104 MMOL/L (ref 98–107)
CO2 SERPL-SCNC: 28 MMOL/L (ref 22–30)
ERYTHROCYTE [DISTWIDTH] IN BLOOD BY AUTOMATED COUNT: 2.04 M/UL (ref 4.3–5.9)
ERYTHROCYTE [DISTWIDTH] IN BLOOD BY AUTOMATED COUNT: 2.06 M/UL (ref 4.3–5.9)
ERYTHROCYTE [DISTWIDTH] IN BLOOD: 24.3 % (ref 11.5–15.5)
ERYTHROCYTE [DISTWIDTH] IN BLOOD: 24.6 % (ref 11.5–15.5)
GLUCOSE SERPL-MCNC: 111 MG/DL (ref 74–99)
HCT VFR BLD AUTO: 20.7 % (ref 39–53)
HCT VFR BLD AUTO: 21.1 % (ref 39–53)
HGB BLD-MCNC: 7.1 GM/DL (ref 13–17.5)
HGB BLD-MCNC: 7.1 GM/DL (ref 13–17.5)
HYALINE CASTS UR QL AUTO: 4 /LPF (ref 0–2)
MCH RBC QN AUTO: 34.3 PG (ref 25–35)
MCH RBC QN AUTO: 34.9 PG (ref 25–35)
MCHC RBC AUTO-ENTMCNC: 33.5 G/DL (ref 31–37)
MCHC RBC AUTO-ENTMCNC: 34.3 G/DL (ref 31–37)
MCV RBC AUTO: 101.7 FL (ref 80–100)
MCV RBC AUTO: 102.3 FL (ref 80–100)
PH UR: 5.5 [PH] (ref 5–8)
PLATELET # BLD AUTO: 12 K/UL (ref 150–450)
PLATELET # BLD AUTO: 16 K/UL (ref 150–450)
POTASSIUM SERPL-SCNC: 4 MMOL/L (ref 3.5–5.1)
PROT SERPL-MCNC: 6.1 G/DL (ref 6.3–8.2)
RBC UR QL: 7 /HPF (ref 0–5)
SODIUM SERPL-SCNC: 138 MMOL/L (ref 137–145)
SP GR UR: 1.02 (ref 1–1.03)
SQUAMOUS UR QL AUTO: <1 /HPF (ref 0–4)
UROBILINOGEN UR QL STRIP: <2 MG/DL (ref ?–2)
WBC # BLD AUTO: 0.7 K/UL (ref 3.8–10.6)
WBC # BLD AUTO: 0.7 K/UL (ref 3.8–10.6)
WBC # UR AUTO: 2 /HPF (ref 0–5)

## 2020-07-25 RX ADMIN — LEVOFLOXACIN SCH MG: 500 TABLET, FILM COATED ORAL at 22:17

## 2020-07-25 RX ADMIN — ISODIUM CHLORIDE SCH MG: 0.03 SOLUTION RESPIRATORY (INHALATION) at 09:04

## 2020-07-25 RX ADMIN — POTASSIUM CHLORIDE SCH MEQ: 20 TABLET, EXTENDED RELEASE ORAL at 08:53

## 2020-07-25 RX ADMIN — NYSTATIN SCH UNIT: 100000 SUSPENSION ORAL at 08:54

## 2020-07-25 RX ADMIN — ISODIUM CHLORIDE SCH MG: 0.03 SOLUTION RESPIRATORY (INHALATION) at 19:02

## 2020-07-25 RX ADMIN — DICYCLOMINE HYDROCHLORIDE SCH MG: 20 TABLET ORAL at 08:54

## 2020-07-25 RX ADMIN — ISODIUM CHLORIDE SCH MG: 0.03 SOLUTION RESPIRATORY (INHALATION) at 12:14

## 2020-07-25 RX ADMIN — ACYCLOVIR SCH MG: 200 CAPSULE ORAL at 08:54

## 2020-07-25 RX ADMIN — CEFEPIME HYDROCHLORIDE SCH MLS/HR: 2 INJECTION, POWDER, FOR SOLUTION INTRAVENOUS at 15:54

## 2020-07-25 RX ADMIN — FUROSEMIDE SCH MG: 40 TABLET ORAL at 08:54

## 2020-07-25 RX ADMIN — NYSTATIN SCH UNIT: 100000 SUSPENSION ORAL at 13:17

## 2020-07-25 RX ADMIN — DICYCLOMINE HYDROCHLORIDE SCH MG: 20 TABLET ORAL at 17:50

## 2020-07-25 RX ADMIN — ACYCLOVIR SCH MG: 200 CAPSULE ORAL at 15:54

## 2020-07-25 RX ADMIN — PANTOPRAZOLE SODIUM SCH MG: 40 TABLET, DELAYED RELEASE ORAL at 08:54

## 2020-07-25 RX ADMIN — CEFEPIME HYDROCHLORIDE SCH MLS/HR: 2 INJECTION, POWDER, FOR SOLUTION INTRAVENOUS at 01:36

## 2020-07-25 RX ADMIN — ACYCLOVIR SCH MG: 200 CAPSULE ORAL at 22:17

## 2020-07-25 RX ADMIN — DICYCLOMINE HYDROCHLORIDE SCH MG: 20 TABLET ORAL at 13:17

## 2020-07-25 RX ADMIN — DICYCLOMINE HYDROCHLORIDE SCH MG: 20 TABLET ORAL at 22:17

## 2020-07-25 RX ADMIN — CEFEPIME HYDROCHLORIDE SCH MLS/HR: 2 INJECTION, POWDER, FOR SOLUTION INTRAVENOUS at 08:54

## 2020-07-25 RX ADMIN — CEFEPIME HYDROCHLORIDE SCH MLS/HR: 2 INJECTION, POWDER, FOR SOLUTION INTRAVENOUS at 23:31

## 2020-07-25 RX ADMIN — NYSTATIN SCH UNIT: 100000 SUSPENSION ORAL at 23:31

## 2020-07-25 RX ADMIN — NYSTATIN SCH UNIT: 100000 SUSPENSION ORAL at 17:50

## 2020-07-25 NOTE — CT
EXAMINATION TYPE: CT chest w con

 

DATE OF EXAM: 7/25/2020

 

COMPARISON: 6/28/2020

 

HISTORY: Lung mass stelate, fever unknown origin.

 

CT DLP: 127 mGycm

Automated exposure control for dose reduction was used.

 

CONTRAST: 

Performed with IV Contrast, patient injected with 100 mL of Isovue 300.

 

 

There is a spiculated 16 x 10 x 30 mm infiltrate in the right upper lobe. This is a somewhat linear d
istribution and appears not significantly different than previous exam. There is 1.5 cm noncalcified 
subpleural nodular density in the posterior aspect of the right midlung in the posterior segment righ
t upper lobe. There is 7 mm noncalcified nodule subpleural in the right lower lobe superior segment. 
There is 2.3 cm cavitating spiculated infiltrate in the left lower lobe posterior basal segment. Ther
e is extensive calcified pleural plaque at the lung bases. There is no pleural effusion. There are no
 hilar masses. There are a few mediastinal lymph nodes that measure up to 1 cm. 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

 

Thoracic vertebra have normal alignment. Disc spaces are fairly normal. There is no compression fract
ure. Sternum is intact. The upper abdominal soft tissues are intact. There is 1.5 cm cyst upper pole 
right kidney.

 

IMPRESSION:

Multiple pulmonary abnormalities as above. Linear somewhat spiculated infiltrate right upper lobe unc
hanged. Subpleural nodule posterior segment right upper lobe unchanged. There is a new cavitating inf
iltrate left lower lobe compared to old exam and more likely inflammatory. There is mild mediastinal 
adenopathy improved compared to old exam. Extensive calcified pleural plaque involving diaphragmatic 
pleura. Findings overall more likely related to inflammatory disease. The follow-up interval is too s
hort to conclude benign etiology.

## 2020-07-25 NOTE — P.PN
Subjective


Progress Note Date: 07/25/20








70-year-old male one of Dr. Luu's patient with past medical history of 

anemia, gout, tobacco use, daily alcohol use and myelodysplasia who has been set

pancytopenic for the last 2 month was started on chemotherapy with hematology 

recently was hospitalized for extended period of time for severe pancytopenia 

and severe neutropenia did not recover.  Patient apparently went back on 

chemotherapy after he left the hospital last time.  Today found to have severe 

anemia with hemoglobin of 5.1 with severe neutropenia white blood cell 1.3 and 

thrombocytopenia with platelets 28,000 only.  Patient brought to the [facility] emergency department where was seen and evaluated has been 

very weak has not been able to ambulate and walk and has been having worsening 

shortness of breath with dyspnea with minimal exertion for the last few days.  

With a current hemoglobin his oncologist and instructed him to come to the 

hospital for admission for transfusion.





7/24: Patient is seen today on the [facility] floor.  He is status post 2 units of 

packed RBCs and repeat lab work reveals hemoglobin of 7.3, WBC 0.9, platelet 

count 18.  Electrolytes normal, BUN 32 and creatinine 0.89.  Calcium 7.9, total 

bilirubin 1.5, AST 42, ALT 40, alkaline phosphatase 66.  Temperature max 100.0, 

heart rate 76, blood pressure 115/66, pulse ox 96% on room air.  Consult in 

place for oncology.  COVID-19 is pending.





7/25: Hemoglobin currently is at 7.1, platelet count of 16, double basic count 

of 0.7 oncology thinks that he has myelodysplasia,patient wants to stay another 

day just in case he needs another blood products, as it's difficult for him to 

be transported back and forth with the wife who has been on a wheelchair, 

patient has chronic cough from smoking, denies any feverhowever T-max is 100.0 

within the past 24 hours, Covid testc negative,chest x-ray shows no acute 

pulmonary disease, has clearing of pulmonary edema and pleural fluid compared to

old exam,urinalyses WBC of 2,blood cultures would be sent secondary to the 

fever, consult Dr. Hernandez for neutropenic fever, unknown primary at this 

time,start patient on cefotaxime,  Levaquin, we will obtain high resolution CT 

of the chest, CT of the abdomen fromJuly 2, 2020 was reviewed , CT June 28, 2 cm

stellate mass right upper lobe, 4 x 2 cm hypodensity posterior spleen no 

hydronephrosis no pulmonary has seen the patient, consult with Dr. Lu 

pulmonary





Review of Systems


CONSTITUTIONAL: Well-developed no acute respiratory distress.  Denies chills


EYES: No icterus sclerae, no conjunctivitis.


EARS, NOSE, MOUTH, THROAT, and FACE: No sore throat, lymphadenopathy, carotid 

bruits or deformity.


RESPIRATORY: Positive shortness of breath cough wheezes.


CARDIOVASCULAR: No CP, Palpitation, PND, Orthopnea, or angina.


GASTROINTESTINAL: Mild change in bowel habit with mild diarrhea and slight 

dyspepsia


GENITOURINARY: Negative for Hematuria or UTI, no kidney stones.


INTEGUMENT/BREAST: Negative for any muscular injury with mild osteoarthritis..


HEMATOLOGIC/LYMPHATIC: History of myelodysplasia severe anemia thrombocytopenia 

and neutropenia.


MUSCULOSKELTAL: Negative for Myalgia or arthralgia.


NEURLOGICAL: No LOC, Sz or syncope, blurred vision dizziness or abnormality..


BEHAVIORAL/PSYCH: Negative.


ENDOCRINE: Negative.





Objective





- Vital Signs


Vital signs: 


                                   Vital Signs











Temp  97.9 F   07/25/20 11:43


 


Pulse  72   07/25/20 12:24


 


Resp  17   07/25/20 11:43


 


BP  105/72   07/25/20 11:43


 


Pulse Ox  100   07/25/20 11:43








                                 Intake & Output











 07/24/20 07/25/20 07/25/20





 18:59 06:59 18:59


 


Intake Total 500 500 


 


Balance 500 500 


 


Weight 55.792 kg  


 


Intake:   


 


  Oral 500 500 


 


Other:   


 


  Voiding Method  Toilet Toilet





  Urinal Urinal


 


  # Voids 1  














- Constitutional


General appearance: Present: cooperative, no acute distress, thin





- EENT


Eyes: Present: anicteric sclerae, EOMI, poor dentition


ENT: Present: hard of hearing, NA/AT, normal oropharynx





- Neck


Neck: Present: normal ROM


Carotids: bilateral: upstroke normal





- Respiratory


Respiratory: bilateral: CTA, diminished, negative: wheezing





- Cardiovascular


Rhythm: regular


Heart sounds: normal: S1, S2


Abnormal Heart Sounds: Absent: systolic murmur, diastolic murmur, rub, S3 

Gallop, S4 Gallop, click, other





- Gastrointestinal


General gastrointestinal: Present: normal bowel sounds, soft





- Integumentary


Integumentary: Present: decreased turgor, normal





- Neurologic


Neurologic: Present: CNII-XII intact





- Musculoskeletal


Musculoskeletal: Present: gait normal, strength equal bilaterally





- Psychiatric


Psychiatric: Present: A&O x's 3, appropriate affect, intact judgment & insight





- Labs


CBC & Chem 7: 


                                 07/26/20 06:34





                                 07/26/20 06:34


Labs: 


                  Abnormal Lab Results - Last 24 Hours (Table)











  07/24/20 07/25/20 07/25/20 Range/Units





  22:22 08:44 08:44 


 


WBC   0.7 L*   (3.8-10.6)  k/uL


 


RBC   2.04 L   (4.30-5.90)  m/uL


 


Hgb   7.1 L   (13.0-17.5)  gm/dL


 


Hct   20.7 L   (39.0-53.0)  %


 


MCV   101.7 H   (80.0-100.0)  fL


 


RDW   24.6 H   (11.5-15.5)  %


 


Plt Count   16 L*   (150-450)  k/uL


 


BUN    26 H  (9-20)  mg/dL


 


Glucose    111 H  (74-99)  mg/dL


 


Calcium    7.9 L  (8.4-10.2)  mg/dL


 


Total Protein    6.1 L  (6.3-8.2)  g/dL


 


Albumin    2.8 L  (3.5-5.0)  g/dL


 


Urine Protein  Trace H    (Negative)  


 


Urine Ketones  Trace H    (Negative)  


 


Urine Blood  Moderate H    (Negative)  


 


Urine RBC  7 H    (0-5)  /hpf


 


Hyaline Casts  4 H    (0-2)  /lpf


 


Urine Mucus  Rare H    (None)  /hpf














Assessment and Plan


Plan: 








Assessment and Plan


1 acute symptomatic anemia.  Patient is status post transfusion of 2 units packe

d RBCs.  Continue Protonix.hemoglobin to be checked tonight 1800, will need 

irrigated blood to keep hemoglobin above 7





2 severe myelodysplasia from MDS : Has been on chemotherapy and seen hematology 

regular basis we'll consult hematology.  Continue cefepime.on oral Levaquin, IV 

vancomycin addedfor empiric treatment,





3.  Neutropenic fever, consult Dr. Yee, cefepime  IVand oral LevaquinCyclovir, 

sputum cultures, blood cultures, urinalysis normal





3 severe neutropenia.  Oncology consult, expect oncology to start Neulasta daily

until the white blood cell is elevated.  In the meanwhile patient will go back 

on acyclovir, Diflucan  





4 COPD without exacerbation.  Continue albuterol nebulizer 3 times daily.  





6.  Lung mass as previously seen June 28, repeat CT chest IV contrast, will need

PET/CTas outpatient against biopsy however with severe thrombocytopenia, this 

would not be an option, consult Dr. Lu, wife and patient aware





5 chronic edema: More diastolic congestive heart failure and chronic continue 

furosemide at 40 mg daily.





6 recurrent pneumonia: Patient is asymptomatic at this point.  Continue inhaler 

and antibiotics.





7 chronic cough, with lung mass 2 cm right stellate, need full evaluation, 

repeat CT chest, consult Dr. Lu, will need either bronchoscopyagainst P

ET/CT, however platelets are too low





7 GI prophylaxis: pantoprazole 40 mg daily.





8 DVT prophylaxis: Patient will stay on Venodyne boots and knee-high HAKAN hose.





CODE STATUS: Full code.

## 2020-07-26 LAB
ALBUMIN SERPL-MCNC: 2.5 G/DL (ref 3.5–5)
ALP SERPL-CCNC: 55 U/L (ref 38–126)
ALT SERPL-CCNC: 38 U/L (ref 4–49)
ANION GAP SERPL CALC-SCNC: 5 MMOL/L
AST SERPL-CCNC: 41 U/L (ref 17–59)
BUN SERPL-SCNC: 34 MG/DL (ref 9–20)
CALCIUM SPEC-MCNC: 7.9 MG/DL (ref 8.4–10.2)
CHLORIDE SERPL-SCNC: 105 MMOL/L (ref 98–107)
CO2 SERPL-SCNC: 27 MMOL/L (ref 22–30)
ERYTHROCYTE [DISTWIDTH] IN BLOOD BY AUTOMATED COUNT: 1.87 M/UL (ref 4.3–5.9)
ERYTHROCYTE [DISTWIDTH] IN BLOOD BY AUTOMATED COUNT: 2.21 M/UL (ref 4.3–5.9)
ERYTHROCYTE [DISTWIDTH] IN BLOOD: 22.6 % (ref 11.5–15.5)
ERYTHROCYTE [DISTWIDTH] IN BLOOD: 24.2 % (ref 11.5–15.5)
GLUCOSE SERPL-MCNC: 97 MG/DL (ref 74–99)
HCT VFR BLD AUTO: 19 % (ref 39–53)
HCT VFR BLD AUTO: 22.6 % (ref 39–53)
HGB BLD-MCNC: 6 GM/DL (ref 13–17.5)
HGB BLD-MCNC: 7.4 GM/DL (ref 13–17.5)
MCH RBC QN AUTO: 31.8 PG (ref 25–35)
MCH RBC QN AUTO: 33.7 PG (ref 25–35)
MCHC RBC AUTO-ENTMCNC: 31.4 G/DL (ref 31–37)
MCHC RBC AUTO-ENTMCNC: 33 G/DL (ref 31–37)
MCV RBC AUTO: 101.5 FL (ref 80–100)
MCV RBC AUTO: 102.3 FL (ref 80–100)
PLATELET # BLD AUTO: 17 K/UL (ref 150–450)
PLATELET # BLD AUTO: 9 K/UL (ref 150–450)
POTASSIUM SERPL-SCNC: 4.7 MMOL/L (ref 3.5–5.1)
PROT SERPL-MCNC: 5.7 G/DL (ref 6.3–8.2)
SODIUM SERPL-SCNC: 137 MMOL/L (ref 137–145)
WBC # BLD AUTO: 0.5 K/UL (ref 3.8–10.6)
WBC # BLD AUTO: 0.7 K/UL (ref 3.8–10.6)

## 2020-07-26 PROCEDURE — 30233R1 TRANSFUSION OF NONAUTOLOGOUS PLATELETS INTO PERIPHERAL VEIN, PERCUTANEOUS APPROACH: ICD-10-PCS

## 2020-07-26 PROCEDURE — 30233N1 TRANSFUSION OF NONAUTOLOGOUS RED BLOOD CELLS INTO PERIPHERAL VEIN, PERCUTANEOUS APPROACH: ICD-10-PCS

## 2020-07-26 RX ADMIN — LEVOFLOXACIN SCH MG: 500 TABLET, FILM COATED ORAL at 20:45

## 2020-07-26 RX ADMIN — NYSTATIN SCH UNIT: 100000 SUSPENSION ORAL at 17:07

## 2020-07-26 RX ADMIN — ISODIUM CHLORIDE SCH MG: 0.03 SOLUTION RESPIRATORY (INHALATION) at 19:35

## 2020-07-26 RX ADMIN — CEFEPIME HYDROCHLORIDE SCH MLS/HR: 2 INJECTION, POWDER, FOR SOLUTION INTRAVENOUS at 23:16

## 2020-07-26 RX ADMIN — DICYCLOMINE HYDROCHLORIDE SCH MG: 20 TABLET ORAL at 14:00

## 2020-07-26 RX ADMIN — DICYCLOMINE HYDROCHLORIDE SCH MG: 20 TABLET ORAL at 09:14

## 2020-07-26 RX ADMIN — POTASSIUM CHLORIDE SCH MEQ: 20 TABLET, EXTENDED RELEASE ORAL at 09:14

## 2020-07-26 RX ADMIN — SODIUM CHLORIDE SCH MLS/HR: 9 INJECTION, SOLUTION INTRAVENOUS at 18:08

## 2020-07-26 RX ADMIN — DICYCLOMINE HYDROCHLORIDE SCH MG: 20 TABLET ORAL at 17:07

## 2020-07-26 RX ADMIN — ISODIUM CHLORIDE SCH MG: 0.03 SOLUTION RESPIRATORY (INHALATION) at 13:07

## 2020-07-26 RX ADMIN — SODIUM CHLORIDE SCH MLS/HR: 9 INJECTION, SOLUTION INTRAVENOUS at 03:50

## 2020-07-26 RX ADMIN — FUROSEMIDE SCH MG: 40 TABLET ORAL at 09:15

## 2020-07-26 RX ADMIN — DICYCLOMINE HYDROCHLORIDE SCH MG: 20 TABLET ORAL at 20:44

## 2020-07-26 RX ADMIN — ISODIUM CHLORIDE SCH MG: 0.03 SOLUTION RESPIRATORY (INHALATION) at 08:34

## 2020-07-26 RX ADMIN — ACETAMINOPHEN PRN MG: 325 TABLET, FILM COATED ORAL at 20:44

## 2020-07-26 RX ADMIN — ACYCLOVIR SCH MG: 200 CAPSULE ORAL at 17:07

## 2020-07-26 RX ADMIN — THERA TABS SCH EACH: TAB at 09:14

## 2020-07-26 RX ADMIN — NYSTATIN SCH UNIT: 100000 SUSPENSION ORAL at 20:44

## 2020-07-26 RX ADMIN — ACYCLOVIR SCH MG: 200 CAPSULE ORAL at 09:14

## 2020-07-26 RX ADMIN — CEFEPIME HYDROCHLORIDE SCH MLS/HR: 2 INJECTION, POWDER, FOR SOLUTION INTRAVENOUS at 09:15

## 2020-07-26 RX ADMIN — CEFEPIME HYDROCHLORIDE SCH MLS/HR: 2 INJECTION, POWDER, FOR SOLUTION INTRAVENOUS at 17:06

## 2020-07-26 RX ADMIN — NYSTATIN SCH UNIT: 100000 SUSPENSION ORAL at 09:22

## 2020-07-26 RX ADMIN — NYSTATIN SCH UNIT: 100000 SUSPENSION ORAL at 14:00

## 2020-07-26 RX ADMIN — PANTOPRAZOLE SODIUM SCH MG: 40 TABLET, DELAYED RELEASE ORAL at 09:14

## 2020-07-26 RX ADMIN — ACYCLOVIR SCH MG: 200 CAPSULE ORAL at 20:44

## 2020-07-26 NOTE — P.CONS
History of Present Illness





- Reason for Consult


Consult date: 20


Febrile neutropenia


Requesting physician: Cheryle Steiner





- Chief Complaint


Fever 1 day





- History of Present Illness


Patient is 70 year  male with a past medical history significant for 

myelodysplasia syndrome in this patient is currently on chemotherapy patient has

been advised admission to the hospital after he was noticed to have low 

hemoglobin of 5.9 in the outpatient setting subsequently the patient has been 

admitted to the hospital and has been managed for his cytopenias, patient did 

have a low-grade fever of 100.8 today that prompted this infection disease 

consultation patient denies having any high-grade fever or chills patient denies

having headache or URI symptoms no chest pain or shortness of breath minimal 

cough denies having any nausea no vomiting no abdominal pain and no diarrhea 

workup so far including a chest x-ray that was reported negative and he did have

a UA that was negative as well patient has been empirically started on cefepime 

and vancomycin and facet disease was consulted for further management of 

antibiotic therapy








Review of Systems


Positive point has been  mentioned in the HPI rest of the systems are negative








Past Medical History


Past Medical History: Blood Disorder, Cancer, Pneumonia, Skin Disorder


Additional Past Medical History / Comment(s): Pt recently admitted to [facility] on 

20 with neutropenic fever/sepsis with possible abdominal source/poorly 

marginated R upper lobe infiltrate/epigastric pain/severe malnutrition/

exacerbation COPD.     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 

received M-F last week, pancytopenia, chronic anemia with transfusions, 

bilateral feet edema,


History of Any Multi-Drug Resistant Organisms: None Reported


Past Surgical History: Orthopedic Surgery


Additional Past Surgical History / Comment(s): BMB, colonoscopy, surgery on rt 

leg and foot after injury age 3


Past Anesthesia/Blood Transfusion Reactions: No Reported Reaction


Past Psychological History: No Psychological Hx Reported


Additional Psychological History / Comment(s): Pt resides with his spouse.  He 

uses no assistive device.  He drives.


Smoking Status: Current every day smoker


Past Alcohol Use History: Occasional


Additional Past Alcohol Use History / Comment(s): Patient started smoking in 

1955 and is down to 1/2 ppd.  Patient used to drink 4-6 beers per day for many 

years and cut down to 1-2 beers per day for the past 2 months.


Past Drug Use History: None Reported





- Past Family History


  ** Mother


Family Medical History: Cancer


Additional Family Medical History / Comment(s): Cervical cancer.





  ** Brother(s)


Family Medical History: Cancer


Additional Family Medical History / Comment(s): Pancreatic cancer with mets.





  ** Father


Additional Family Medical History / Comment(s): Father  at age 93 from old 

age.





Medications and Allergies


                                Home Medications











 Medication  Instructions  Recorded  Confirmed  Type


 


Acyclovir 400 mg PO TID #90 tablet 20 Rx


 


Fluconazole [Diflucan] 100 mg PO DAILY #30 tab 20 Rx


 


Acetaminophen Tab [Tylenol] 650 mg PO Q6HR PRN  tab 20 Rx


 


Multivitamins, Thera [Multivitamin 1 tab PO DAILY 20 History





(formulary)]    


 


Albuterol Inhaler [Ventolin Hfa 2 puff INHALATION RT-TID #1 inhaler 20 Rx





Inhaler]    


 


Dicyclomine [Bentyl] 20 mg PO QID #120 tab 20 Rx


 


Furosemide [Lasix] 40 mg PO DAILY #30 tablet 20 Rx


 


Potassium Chloride ER [K-Dur 20] 20 meq PO DAILY #30 tab 20 Rx








                                    Allergies











Allergy/AdvReac Type Severity Reaction Status Date / Time


 


No Known Allergies Allergy   Verified 20 13:26














Physical Exam


Vitals: 


                                   Vital Signs











  Temp Pulse Pulse Resp BP Pulse Ox


 


 20 12:24   72    


 


 20 12:14   80    


 


 20 11:43  97.9 F   75  17  105/72  100


 


 20 09:14   72    


 


 20 09:04   72    


 


 20 05:00  99.8 F H   75  18  133/77  98


 


 20 21:00  99.7 F H   73  18  105/63  97


 


 20 20:02   72    


 


 20 19:53   68    








                                Intake and Output











 20





 06:59 14:59 22:59


 


Intake Total  600 


 


Balance  600 


 


Intake:   


 


  Oral  600 


 


Other:   


 


  Voiding Method Toilet Toilet 





 Urinal Urinal 











GENERAL DESCRIPTION: Elderly male lying in bed, no distress. No tachypnea or 

accessory muscle of respiration use.


HEENT: Shows Pallor , no scleral icterus. Oral mucous membrane is dry. No 

pharyngeal erythema or thrush


NECK: Trachea central, no thyromegaly.


LUNGS: Unlabored breathing.  Decreased breath sound at the base No wheeze or 

crackle.


HEART: S1, S2, regular rate and rhythm. No loud murmur


ABDOMEN: Soft, no tenderness , guarding or rigidity, no organomegaly


EXTREMITIES: No edema of feet.


SKIN: No rash, no masses palpable.


NEUROLOGICAL: The patient is awake, alert, oriented x3, mood and affect normal.

















Results


CBC & Chem 7: 


                                 20 06:34





                                 20 06:34


Labs: 


                  Abnormal Lab Results - Last 24 Hours (Table)











  20 Range/Units





  22:22 08:44 08:44 


 


WBC   0.7 L*   (3.8-10.6)  k/uL


 


RBC   2.04 L   (4.30-5.90)  m/uL


 


Hgb   7.1 L   (13.0-17.5)  gm/dL


 


Hct   20.7 L   (39.0-53.0)  %


 


MCV   101.7 H   (80.0-100.0)  fL


 


RDW   24.6 H   (11.5-15.5)  %


 


Plt Count   16 L*   (150-450)  k/uL


 


BUN    26 H  (9-20)  mg/dL


 


Glucose    111 H  (74-99)  mg/dL


 


Calcium    7.9 L  (8.4-10.2)  mg/dL


 


Total Protein    6.1 L  (6.3-8.2)  g/dL


 


Albumin    2.8 L  (3.5-5.0)  g/dL


 


Urine Protein  Trace H    (Negative)  


 


Urine Ketones  Trace H    (Negative)  


 


Urine Blood  Moderate H    (Negative)  


 


Urine RBC  7 H    (0-5)  /hpf


 


Hyaline Casts  4 H    (0-2)  /lpf


 


Urine Mucus  Rare H    (None)  /hpf














Assessment and Plan


Assessment: 


1- patient with febrile neutropenia in this patient who do have underlying 

myelodysplastic syndrome and has been on chemotherapy now with a low-grade fever

but no definite localizing signs and symptoms of infection patient was recently 

admitted with similar symptoms and did have abnormality of the CT of the chest 

that may be repeated to investigate further his fever has current workup so far 

chest x-ray and UA has been negative abdominal soft on clinical examination and 

no evidence of any cellulitis





(1) Febrile neutropenia


Current Visit: Yes   Status: Acute   Code(s): D70.9 - NEUTROPENIA, UNSPECIFIED; 

R50.81 - FEVER PRESENTING WITH CONDITIONS CLASSIFIED ELSEWHERE   SNOMED Code(s):

440661237


   


Plan: 


1- cefepime 2 g every 8 hours


2-Vancomycin pharmacy to dose target trough of 15 while watching his kidney 

function and Vanco trough closely


3-await CT of the chest


We will follow on clinical condition and cultures to further adjust medication 

if needed


Thank you for this consultation will follow this patient with you





Time with Patient: Greater than 30

## 2020-07-26 NOTE — P.CNPUL
History of Present Illness


Consult date: 20


Requesting physician: Yogesh Mendoza


Reason for consult: abnormal CXR/CT


Chief complaint: Weakness, shortness of breath


History of present illness: 





This is a very pleasant 70-year-old gentleman who follows with Dr. Luu is 

his primary care provider.  He has a history of gout, chronic and ongoing 

tobacco dependence, and myelodysplastic syndrome, high-grade and has been seen 

by medical oncology.  He has received a second round of chemotherapy with G-CSF 

approximately a week ago.  He did receive Neulasta as well.  He presented to the

emergency room on 2020 after being told by his PCP his hemoglobin was 

quite low.  The patient had been having weakness and shortness of breath.  

Initial labs revealed a WBC of 1.3.  Hemoglobin 5.1.  Platelets 28,000.  He is 

status post 2 units of packed red blood cells.  A third unit of packed red blood

cells as ordered for today for hemoglobin is 6.0.  He will be receiving 

platelets for a platelet count of 9000.  Yesterday he had undergone a computed 

tomography scan of the chest that revealed multiple pulmonary abnormalities 

including a spiculated 16 x 10 x 30 mm infiltrate of the right upper lobe.  

There is a 1.5 cm noncalcified subpleural nodular density in the posterior 

aspect of the right midlung in the posterior segment right upper lobe.  There is

a 2.3 cm cavitating spiculated infiltrate of the left lower lobe posterior basal

segment.  Extensive calcified pleural plaque at the lung bases.  No pleural 

effusion.  No hilar masses.  Few mediastinal lymph nodes measuring 1 cm.  We are

consulted for the same.  The patient is seen today on the oncology unit.  He is 

currently resting comfortably in bed.  Awake and alert in no acute distress.  

Somewhat of a poor historian.  His shortness of breath is about the same.  No 

cough or congestion.  No fever chills or night sweats.  Temperature last evening

was 100.8.  Maintaining O2 saturations in the mid 90s on 3 L/m per nasal 

cannula.  Systolic blood pressure in the 90s.  Blood cultures revealing no 

growth to date.  White count 0.7.  Hemoglobin 6.0.  Platelets 9000.  Sodium 137.

 Potassium 4.7.  Creatinine 1.22.





Review of Systems





REVIEW OF SYSTEMS:


CONSTITUTIONAL: Positive for generalized weakness, fatigue.  Admits to gradual 

weight loss.


EYES: Denies change in vision.


EARS, NOSE, MOUTH, THROAT: Denies headaches, denies sore throat.


CARDIOVASCULAR: Denies chest pain, palpitations or syncopal episodes.


RESPIRATORY: Positive for shortness of breath, no cough, congestion or 

hemoptysis.


GASTROINTESTINAL: Denies change in appetite, denies abdominal pain


GENITOURINARY: Denies hematuria, denies infections.


MUSKULOSKELETAL: Denies pain, denies swelling.


INTEGUMENTARY: Denies rash, denies eczema.


NEUROLOGICAL: Denies recent memory loss, no recent seizure activity. 


PSYCHIATRIC: Denies anxiety, denies depression.


HEMATOLOGIC/LYMPHATIC: Denies anemia, denies enlarged lymph nodes.








Past Medical History


Past Medical History: Blood Disorder, Cancer, Pneumonia, Skin Disorder


Additional Past Medical History / Comment(s): Pt recently admitted to [facility] on 

20 with neutropenic fever/sepsis with possible abdominal source/poorly 

marginated R upper lobe infiltrate/epigastric pain/severe 

malnutrition/exacerbation COPD.     Other hx:  MDS with ringed sideroblast-last 

chemo received M-F last week, pancytopenia, chronic anemia with transfusions, 

bilateral feet edema,


History of Any Multi-Drug Resistant Organisms: None Reported


Past Surgical History: Orthopedic Surgery


Additional Past Surgical History / Comment(s): BMB, colonoscopy, surgery on rt 

leg and foot after injury age 3


Past Anesthesia/Blood Transfusion Reactions: No Reported Reaction


Past Psychological History: No Psychological Hx Reported


Additional Psychological History / Comment(s): Pt resides with his spouse.  He 

uses no assistive device.  He drives.


Smoking Status: Current every day smoker


Past Alcohol Use History: Occasional


Additional Past Alcohol Use History / Comment(s): Patient started smoking in 

1955 and is down to 1/2 ppd.  Patient used to drink 4-6 beers per day for many 

years and cut down to 1-2 beers per day for the past 2 months.


Past Drug Use History: None Reported





- Past Family History


  ** Mother


Family Medical History: Cancer


Additional Family Medical History / Comment(s): Cervical cancer.





  ** Brother(s)


Family Medical History: Cancer


Additional Family Medical History / Comment(s): Pancreatic cancer with mets.





  ** Father


Additional Family Medical History / Comment(s): Father  at age 93 from old 

age.





Medications and Allergies


                                Home Medications











 Medication  Instructions  Recorded  Confirmed  Type


 


Acyclovir 400 mg PO TID #90 tablet 20 Rx


 


Fluconazole [Diflucan] 100 mg PO DAILY #30 tab 20 Rx


 


Acetaminophen Tab [Tylenol] 650 mg PO Q6HR PRN  tab 20 Rx


 


Multivitamins, Thera [Multivitamin 1 tab PO DAILY 20 History





(formulary)]    


 


Albuterol Inhaler [Ventolin Hfa 2 puff INHALATION RT-TID #1 inhaler 20 Rx





Inhaler]    


 


Dicyclomine [Bentyl] 20 mg PO QID #120 tab 20 Rx


 


Furosemide [Lasix] 40 mg PO DAILY #30 tablet 20 Rx


 


Potassium Chloride ER [K-Dur 20] 20 meq PO DAILY #30 tab 20 Rx








                                    Allergies











Allergy/AdvReac Type Severity Reaction Status Date / Time


 


No Known Allergies Allergy   Verified 20 13:26














Physical Exam


Vitals: 


                                   Vital Signs











  Temp Pulse Pulse Resp BP BP Pulse Ox


 


 20 11:25  98.2 F  70   17  95/53  


 


 20 11:15  98.3 F  72    94/49   98


 


 20 08:47   76     


 


 20 08:34   76     


 


 20 05:00  99.3 F   77  18   93/54  94 L


 


 20 03:50  99.4 F      


 


 20 20:15  100.8 F H   96  18   130/64  95


 


 20 19:11   73     


 


 20 19:03   73     


 


 20 12:24   72     


 


 20 12:14   80     








                                Intake and Output











 20





 22:59 06:59 14:59


 


Intake Total 250 600 0


 


Balance 250 600 0


 


Intake:   


 


  Intake, IV Titration  350 





  Amount   


 


    Cefepime 2 gm In Sodium  100 





    Chloride 0.9% 100 ml @   





    200 mls/hr IVPB Q8HR [facility]   





    Rx#:[id number]   


 


    Vancomycin 1,000 mg In  250 





    Sodium Chloride 0.9% 250   





    ml @ 125 mls/hr IVPB ONCE   





    ONE Rx#:[id number]   


 


  Oral 250 250 


 


  Blood Product   0


 


    Platelet Irr Pheresis   0





    Acda1  Unit [id number]   


 


Other:   


 


  Voiding Method Toilet Toilet Toilet





 Urinal Urinal Urinal


 


  # Voids 1 0 


 


  # Bowel Movements  0 














GENERAL EXAM: Alert, very pleasant 70-year-old male patient, on 4 L nasal cannul

a, comfortable in no apparent distress.


HEAD: Normocephalic.


EYES: Normal reaction of pupils, equal size.


NOSE: Clear with pink turbinates.


THROAT: No erythema or exudates.


NECK: No masses, no JVD.


CHEST: No chest wall deformity.


LUNGS: Equal air entry with no crackles, wheeze, rhonchi or dullness.


CVS: S1 and S2 normal with no audible murmur, regular rhythm.


ABDOMEN: No hepatosplenomegaly, normal bowel sounds, no guarding or rigidity.


SPINE: No scoliosis or deformity


SKIN: No rashes


CENTRAL NERVOUS SYSTEM: No focal deficits, tone is normal in all 4 extremities.


EXTREMITIES: There is no peripheral edema.  No clubbing, no cyanosis.  

Peripheral pulses are intact.





Results





- Laboratory Findings


CBC and BMP: 


                                 20 06:34





                                 20 06:34


PT/INR, D-dimer











PT  9.7 sec (9.0-12.0)   20  13:39    


 


INR  0.9  (<1.2)   20  13:39    








Abnormal lab findings: 


                                  Abnormal Labs











  20





  13:39 13:39 13:39


 


WBC  1.3 L*  


 


RBC  1.52 L  


 


Hgb  5.1 L* D  


 


Hct  16.3 L*  


 


MCV  106.6 H D  


 


RDW  28.1 H  


 


Plt Count  28 L D  


 


Neutrophils #  0.2 L*  


 


Lymphocytes #  0.9 L  


 


Macrocytosis  Marked A  


 


APTT   


 


Sodium   136 L 


 


BUN   41 H 


 


Glucose   


 


Calcium   8.3 L 


 


Total Bilirubin   


 


Total Protein   


 


Albumin   3.1 L 


 


Urine Protein   


 


Urine Ketones   


 


Urine Blood   


 


Urine RBC   


 


Hyaline Casts   


 


Urine Mucus   


 


Crossmatch    See Detail














  20





  13:39 06:50 06:50


 


WBC   0.9 L* 


 


RBC   2.21 L 


 


Hgb   7.3 L D 


 


Hct   22.2 L 


 


MCV   100.2 H D 


 


RDW   25.6 H 


 


Plt Count   18 L* 


 


Neutrophils #   


 


Lymphocytes #   


 


Macrocytosis   


 


APTT  21.9 L  


 


Sodium   


 


BUN    32 H


 


Glucose   


 


Calcium    7.9 L


 


Total Bilirubin    1.5 H


 


Total Protein    6.0 L


 


Albumin    2.7 L


 


Urine Protein   


 


Urine Ketones   


 


Urine Blood   


 


Urine RBC   


 


Hyaline Casts   


 


Urine Mucus   


 


Crossmatch   














  20





  22:22 08:44 08:44


 


WBC   0.7 L* 


 


RBC   2.04 L 


 


Hgb   7.1 L 


 


Hct   20.7 L 


 


MCV   101.7 H 


 


RDW   24.6 H 


 


Plt Count   16 L* 


 


Neutrophils #   


 


Lymphocytes #   


 


Macrocytosis   


 


APTT   


 


Sodium   


 


BUN    26 H


 


Glucose    111 H


 


Calcium    7.9 L


 


Total Bilirubin   


 


Total Protein    6.1 L


 


Albumin    2.8 L


 


Urine Protein  Trace H  


 


Urine Ketones  Trace H  


 


Urine Blood  Moderate H  


 


Urine RBC  7 H  


 


Hyaline Casts  4 H  


 


Urine Mucus  Rare H  


 


Crossmatch   














  20





  17:56 06:34 06:34


 


WBC  0.7 L*  0.7 L* 


 


RBC  2.06 L  1.87 L 


 


Hgb  7.1 L  6.0 L* 


 


Hct  21.1 L  19.0 L* 


 


MCV  102.3 H  101.5 H 


 


RDW  24.3 H  24.2 H 


 


Plt Count  12 L*  9 L* 


 


Neutrophils #   


 


Lymphocytes #   


 


Macrocytosis   


 


APTT   


 


Sodium   


 


BUN    34 H


 


Glucose   


 


Calcium    7.9 L


 


Total Bilirubin   


 


Total Protein    5.7 L


 


Albumin    2.5 L


 


Urine Protein   


 


Urine Ketones   


 


Urine Blood   


 


Urine RBC   


 


Hyaline Casts   


 


Urine Mucus   


 


Crossmatch   














- Diagnostic Findings


Chest x-ray: image reviewed


CT scan - chest: image reviewed





Assessment and Plan


Assessment: 





1 Pancytopenia secondary to antineoplastic chemotherapy for myelodysplastic 

syndrome.  He had been treated with G CSF 2.  Received Neulasta.





2 Anemia secondary to above presenting hemoglobin 5.1.  Status post 2 units 

packed red blood cells with improvement of 7.3.  Today's hemoglobin 6.0.  

Receiving a third unit of packed red blood cells.





3 Thrombocytopenia secondary to above current platelet count 19,000.  Receiving 

platelet transfusion today.





4 Leukopenia secondary to above.





5 Multiple pulmonary infiltrates including a spiculated 16 x 10 x 30 mm 

infiltrate in the right upper lobe.  A 1.5 cm noncalcified subpleural nodular 

density in the posterior segment of the right upper lobe.  7 mm noncalcified 

nodule of the right lower lobe superior segment.  A 2.3 cm cavitating spiculated

infiltrate of the left lower lobe posterior basal segment.  There is extensive 

calcified pleural plaque of the lung bases.  No hilar mass.  Few mediastinal 

lymph nodes that measure 1 cm.





6 Chronic tobacco dependence of greater than 60 years





7 Daily alcohol use





Plan:





The patient was seen and evaluated by Dr. Mckeon 


Chest x-ray, CAT scans and labs reviewed


Suspect inflammatory changes including the new left lower lobe cavitating lesion


Recommend 3 month follow-up with a repeat computed tomography scan


Currently on vancomycin, Levaquin, cefepime per ID services


Add Ventolin HFA as needed


We will continue to follow and make further recommendations based on his 

clinical status.





I, the cosigning physician, performed a history & physical examination of the 

patient. Lungs sounds are clear, diminished.  Maintaining good O2 saturations in

the 90s on 3 L/m per nasal cannula.  I discussed the assessment and plan of care

with my nurse practitioner, Lisa Ziegler. I attest to the above consultation as 

dictated by her.


Time with Patient: Greater than 30

## 2020-07-26 NOTE — P.PN
Subjective


Progress Note Date: 07/26/20








70-year-old male one of Dr. Luu's patient with past medical history of 

anemia, gout, tobacco use, daily alcohol use and myelodysplasia who has been set

pancytopenic for the last 2 month was started on chemotherapy with hematology 

recently was hospitalized for extended period of time for severe pancytopenia 

and severe neutropenia did not recover.  Patient apparently went back on 

chemotherapy after he left the hospital last time.  Today found to have severe 

anemia with hemoglobin of 5.1 with severe neutropenia white blood cell 1.3 and 

thrombocytopenia with platelets 28,000 only.  Patient brought to the [facility] emergency department where was seen and evaluated has been 

very weak has not been able to ambulate and walk and has been having worsening 

shortness of breath with dyspnea with minimal exertion for the last few days.  

With a current hemoglobin his oncologist and instructed him to come to the 

hospital for admission for transfusion.





7/24: Patient is seen today on the [facility] floor.  He is status post 2 units of 

packed RBCs and repeat lab work reveals hemoglobin of 7.3, WBC 0.9, platelet 

count 18.  Electrolytes normal, BUN 32 and creatinine 0.89.  Calcium 7.9, total 

bilirubin 1.5, AST 42, ALT 40, alkaline phosphatase 66.  Temperature max 100.0, 

heart rate 76, blood pressure 115/66, pulse ox 96% on room air.  Consult in 

place for oncology.  COVID-19 is pending.





7/25: Hemoglobin currently is at 7.1, platelet count of 16, double basic count 

of 0.7 oncology thinks that he has myelodysplasia,patient wants to stay another 

day just in case he needs another blood products, as it's difficult for him to 

be transported back and forth with the wife who has been on a wheelchair, 

patient has chronic cough from smoking, denies any feverhowever T-max is 100.0 

within the past 24 hours, Covid testc negative,chest x-ray shows no acute 

pulmonary disease, has clearing of pulmonary edema and pleural fluid compared to

old exam,urinalyses WBC of 2,blood cultures would be sent secondary to the 

fever, consult Dr. Hernandez for neutropenic fever, unknown primary at this 

time,start patient on cefotaxime,  Levaquin, we will obtain high resolution CT 

of the chest, CT of the abdomen fromJuly 2, 2020 was reviewed , CT June 28, 2 cm

stellate mass right upper lobe, 4 x 2 cm hypodensity posterior spleen no 

hydronephrosis no pulmonary has seen the patient, consult with Dr. Tabitha peres





7/26: Patient is being transfused currently for hemoglobin of 6.0, along with pl

atelet transfusion, both are irritated, platelet is down to 9, currently on IV 

cefepime with Tierneyo, Dr. Chaparro is following, CAT scan of the chest shows multiple 

pulmonary infiltrates including a spiculated lesion 16 x 10 x 13 mm right upper 

lobe, 1.5 cm noncalcified subpleural nodular density posterior segment right 

upper lobe, 7 mm noncalcified nodule right lower lobe, 2.3, dictation spiculated

left lower lobe, posterior basal segment, suspected inflammatory changes, Dr. Mckeon pulmonary has been consulted, with recommendations for follow-up CT in 3 

months, sputum culture sent today patient still has cough no hemoptysis, no 

conversational dyspnea, no active wheezing, T-max of 98.2, blood pressure is 

between 90-98 systolic pressures, nonlabored breathing pulse ox room air 98%





Review of Systems


CONSTITUTIONAL: Well-developed no acute respiratory distress.  Denies chills


EYES: No icterus sclerae, no conjunctivitis.


EARS, NOSE, MOUTH, THROAT, and FACE: No sore throat, lymphadenopathy, carotid 

bruits or deformity.


RESPIRATORY: Positive shortness of breath cough wheezes.


CARDIOVASCULAR: No CP, Palpitation, PND, Orthopnea, or angina.


GASTROINTESTINAL: Mild change in bowel habit with mild diarrhea and slight 

dyspepsia


GENITOURINARY: Negative for Hematuria or UTI, no kidney stones.


INTEGUMENT/BREAST: Negative for any muscular injury with mild osteoarthritis..


HEMATOLOGIC/LYMPHATIC: History of myelodysplasia severe anemia thrombocytopenia 

and neutropenia.


MUSCULOSKELTAL: Negative for Myalgia or arthralgia.


NEURLOGICAL: No LOC, Sz or syncope, blurred vision dizziness or abnormality..


BEHAVIORAL/PSYCH: Negative.


ENDOCRINE: Negative.





Objective





- Vital Signs


Vital signs: 


                                   Vital Signs











Temp  98.4 F   07/26/20 12:48


 


Pulse  72   07/26/20 13:19


 


Resp  17   07/26/20 11:25


 


BP  98/46   07/26/20 12:48


 


Pulse Ox  98   07/26/20 12:48








                                 Intake & Output











 07/25/20 07/26/20 07/26/20





 18:59 06:59 18:59


 


Intake Total 600 850 212


 


Balance 600 850 212


 


Intake:   


 


  Intake, IV Titration  350 





  Amount   


 


    Cefepime 2 gm In Sodium  100 





    Chloride 0.9% 100 ml @   





    200 mls/hr IVPB Q8HR [facility]   





    Rx#:[id number]   


 


    Vancomycin 1,000 mg In  250 





    Sodium Chloride 0.9% 250   





    ml @ 125 mls/hr IVPB ONCE   





    ONE Rx#:[id number]   


 


  Oral 600 500 


 


  Blood Product   212


 


    Platelet Irr Pheresis   212





    Acda1  Unit [id number]   


 


Other:   


 


  Voiding Method Toilet Toilet Toilet





 Urinal Urinal Urinal


 


  # Voids  0 


 


  # Bowel Movements  0 














- Constitutional


General appearance: Present: cooperative, no acute distress





- EENT


Eyes: Present: EOMI, PERRLA, dentition normal, normal appearance


ENT: Present: NA/AT





- Respiratory


Respiratory: bilateral: CTA, diminished, prolonged expiration, negative: rales, 

rhonchi, wheezing





- Cardiovascular


Rhythm: regular


Heart sounds: normal: S1, S2





- Gastrointestinal


General gastrointestinal: Present: normal bowel sounds, soft





- Integumentary


Integumentary: Present: decreased turgor, normal





- Neurologic


Neurologic: Present: CNII-XII intact





- Musculoskeletal


Musculoskeletal: Present: gait normal, strength equal bilaterally





- Psychiatric


Psychiatric: Present: A&O x's 3, appropriate affect





- Labs


CBC & Chem 7: 


                                 07/26/20 06:34





                                 07/26/20 06:34


Labs: 


                  Abnormal Lab Results - Last 24 Hours (Table)











  07/23/20 07/25/20 07/26/20 Range/Units





  13:39 17:56 06:34 


 


WBC   0.7 L*  0.7 L*  (3.8-10.6)  k/uL


 


RBC   2.06 L  1.87 L  (4.30-5.90)  m/uL


 


Hgb   7.1 L  6.0 L*  (13.0-17.5)  gm/dL


 


Hct   21.1 L  19.0 L*  (39.0-53.0)  %


 


MCV   102.3 H  101.5 H  (80.0-100.0)  fL


 


RDW   24.3 H  24.2 H  (11.5-15.5)  %


 


Plt Count   12 L*  9 L*  (150-450)  k/uL


 


BUN     (9-20)  mg/dL


 


Calcium     (8.4-10.2)  mg/dL


 


Total Protein     (6.3-8.2)  g/dL


 


Albumin     (3.5-5.0)  g/dL


 


Crossmatch  See Detail    














  07/26/20 Range/Units





  06:34 


 


WBC   (3.8-10.6)  k/uL


 


RBC   (4.30-5.90)  m/uL


 


Hgb   (13.0-17.5)  gm/dL


 


Hct   (39.0-53.0)  %


 


MCV   (80.0-100.0)  fL


 


RDW   (11.5-15.5)  %


 


Plt Count   (150-450)  k/uL


 


BUN  34 H  (9-20)  mg/dL


 


Calcium  7.9 L  (8.4-10.2)  mg/dL


 


Total Protein  5.7 L  (6.3-8.2)  g/dL


 


Albumin  2.5 L  (3.5-5.0)  g/dL


 


Crossmatch   








                      Microbiology - Last 24 Hours (Table)











 07/26/20 11:45 Sputum Culture - Preliminary





 Sputum 


 


 07/24/20 20:34 Blood Culture - Preliminary





 Blood    No Growth after 24 hours














Assessment and Plan


Plan: 








Assessment and Plan


1 acute symptomatic anemia with significant thrombocytopenia.  Patient is status

post transfusion of 2 units packed RBCs.  One unit packed red blood cells, on 

July 26 with 4 units of platelets both irrigated.  Continue Protonix.hemoglobin 

to be checked tonight 1800, will need irrigated blood to keep hemoglobin above 7





2 severe myelodysplasia from MDS : Has been on chemotherapy and seen hematology 

regular basis we'll consult hematology.  Continue cefepime.on oral Levaquin, IV 

vancomycin addedfor empiric treatment,





3.  Neutropenic fever, consult Dr. Yee, cefepime  IVand oral LevaquinCyclovir, 

sputum cultures, blood cultures, urinalysis normal





3 severe neutropenia.  Oncology consult, expect oncology to start Neulasta daily

until the white blood cell is elevated.  In the meanwhile patient will go back 

on acyclovir, Diflucan  





4 COPD without exacerbation.  Continue albuterol nebulizer 3 times daily.  





6.  Bilateral pneumonia recurrent, Multiple spiculated mass bilateral, Lung mass

as previously seen June 28, repeat CT chest IV contrast, will need PET/CTas 

outpatient against biopsy however with severe thrombocytopenia, this would not 

be an option, consult Dr. Lu, wife and patient aware follow-up CT in 3 

months per recommendation from pulmonary, inflammatory changes were suspected by

radiology, IV vancomycin and IV cefepime for neutropenic fever Dr. Butted 

following.  Check for Legionella





5 chronic edema: More diastolic congestive heart failure and chronic continue 

furosemide at 40 mg daily.





6 recurrent pneumonia: Patient is asymptomatic at this point.  Continue inhaler 

and antibiotics.





7 chronic cough, with lung mass 2 cm right stellate, need full evaluation, 

repeat CT chest as above 7/25, consult Dr. Lu, will need either 

bronchoscopyagainst PET/CT, however platelets are too low, sputum cultures sent





7 GI prophylaxis: pantoprazole 40 mg daily.





8 DVT prophylaxis: Patient will stay on Venodyne boots and knee-high HAKAN hose.





CODE STATUS: Full code.

## 2020-07-26 NOTE — P.PN
Subjective


Progress Note Date: 07/26/20





Patient states that he feels mildly lightheaded on exertion, but denies any 

falls or significant dizziness.  No obvious bleeding.  No 

fever/chills/nausea/vomiting.





Objective





- Vital Signs


Vital signs: 


                                   Vital Signs











Temp  99.3 F   07/26/20 05:00


 


Pulse  76   07/26/20 08:47


 


Resp  18   07/26/20 05:00


 


BP  93/54   07/26/20 05:00


 


Pulse Ox  94 L  07/26/20 05:00








                                 Intake & Output











 07/25/20 07/26/20 07/26/20





 18:59 06:59 18:59


 


Intake Total 600 850 


 


Balance 600 850 


 


Intake:   


 


  Intake, IV Titration  350 





  Amount   


 


    Cefepime 2 gm In Sodium  100 





    Chloride 0.9% 100 ml @   





    200 mls/hr IVPB Q8HR [facility]   





    Rx#:[id number]   


 


    Vancomycin 1,000 mg In  250 





    Sodium Chloride 0.9% 250   





    ml @ 125 mls/hr IVPB ONCE   





    ONE Rx#:[id number]   


 


  Oral 600 500 


 


Other:   


 


  Voiding Method Toilet Toilet 





 Urinal Urinal 


 


  # Voids  0 


 


  # Bowel Movements  0 














- Constitutional


General appearance: Present: no acute distress





- EENT


Eyes: Present: EOMI


ENT: Present: hearing grossly normal, normal oropharynx





- Respiratory


Respiratory: bilateral: CTA





- Cardiovascular


Rhythm: regular


Heart sounds: normal: S1, S2





- Gastrointestinal


General gastrointestinal: Present: normal bowel sounds, soft





- Integumentary


Integumentary: Present: normal





- Neurologic


Neurologic: Present: CNII-XII intact





- Musculoskeletal


Musculoskeletal: Present: generalized weakness, strength equal bilaterally





- Psychiatric


Psychiatric: Present: A&O x's 3, appropriate affect





- Labs


CBC & Chem 7: 


                                 07/26/20 06:34





                                 07/26/20 06:34


Labs: 


                  Abnormal Lab Results - Last 24 Hours (Table)











  07/25/20 07/26/20 07/26/20 Range/Units





  17:56 06:34 06:34 


 


WBC  0.7 L*  0.7 L*   (3.8-10.6)  k/uL


 


RBC  2.06 L  1.87 L   (4.30-5.90)  m/uL


 


Hgb  7.1 L  6.0 L*   (13.0-17.5)  gm/dL


 


Hct  21.1 L  19.0 L*   (39.0-53.0)  %


 


MCV  102.3 H  101.5 H   (80.0-100.0)  fL


 


RDW  24.3 H  24.2 H   (11.5-15.5)  %


 


Plt Count  12 L*  9 L*   (150-450)  k/uL


 


BUN    34 H  (9-20)  mg/dL


 


Calcium    7.9 L  (8.4-10.2)  mg/dL


 


Total Protein    5.7 L  (6.3-8.2)  g/dL


 


Albumin    2.5 L  (3.5-5.0)  g/dL








                      Microbiology - Last 24 Hours (Table)











 07/24/20 20:34 Blood Culture - Preliminary





 Blood    No Growth after 24 hours














Assessment and Plan


(1) Pancytopenia due to antineoplastic chemotherapy


Narrative/Plan: 


The patient had underlying cytopenias due to his MDS.  He currently has p

ancytopenia due to additional effect of chemotherapy.  This is expected.  He has

already received Neulasta.  If there is no improvement in WBC by tomorrow, he 

can be started on additional daily G-CSF.


- Continue to provide transfusion support to keep hemoglobin greater than 7 and 

platelets greater than 10.  Based on his counts will receive a unit of blood and

platelets today.


- Only irradiated blood products


Current Visit: Yes   Status: Acute   Code(s): D61.810 - ANTINEOPLASTIC 

CHEMOTHERAPY INDUCED PANCYTOPENIA; T45.1X5A - ADVERSE EFFECT OF ANTINEOPLASTIC 

AND IMMUNOSUP DRUGS, INIT   SNOMED Code(s): 105998812968379


   





(2) MDS (myelodysplastic syndrome), high grade


Narrative/Plan: 


Status post 2 cycles of HMA.  At this time plan is to continue treatment 

assuming adequate count recovery occurs before cycle 3 scheduled


Current Visit: No   Status: Acute   Priority: High   Code(s): D46.Z - OTHER 

MYELODYSPLASTIC SYNDROMES   SNOMED Code(s): 390521221

## 2020-07-27 LAB
ALBUMIN SERPL-MCNC: 2.4 G/DL (ref 3.5–5)
ALP SERPL-CCNC: 55 U/L (ref 38–126)
ALT SERPL-CCNC: 33 U/L (ref 4–49)
ANION GAP SERPL CALC-SCNC: 5 MMOL/L
AST SERPL-CCNC: 34 U/L (ref 17–59)
BUN SERPL-SCNC: 35 MG/DL (ref 9–20)
CALCIUM SPEC-MCNC: 7.8 MG/DL (ref 8.4–10.2)
CHLORIDE SERPL-SCNC: 108 MMOL/L (ref 98–107)
CO2 SERPL-SCNC: 24 MMOL/L (ref 22–30)
ERYTHROCYTE [DISTWIDTH] IN BLOOD BY AUTOMATED COUNT: 2.1 M/UL (ref 4.3–5.9)
ERYTHROCYTE [DISTWIDTH] IN BLOOD: 22.2 % (ref 11.5–15.5)
GLUCOSE SERPL-MCNC: 114 MG/DL (ref 74–99)
HCT VFR BLD AUTO: 21.3 % (ref 39–53)
HGB BLD-MCNC: 7 GM/DL (ref 13–17.5)
MCH RBC QN AUTO: 33.5 PG (ref 25–35)
MCHC RBC AUTO-ENTMCNC: 32.9 G/DL (ref 31–37)
MCV RBC AUTO: 101.7 FL (ref 80–100)
PLATELET # BLD AUTO: 9 K/UL (ref 150–450)
POTASSIUM SERPL-SCNC: 4.2 MMOL/L (ref 3.5–5.1)
PROT SERPL-MCNC: 5.6 G/DL (ref 6.3–8.2)
SODIUM SERPL-SCNC: 137 MMOL/L (ref 137–145)
WBC # BLD AUTO: 0.5 K/UL (ref 3.8–10.6)

## 2020-07-27 RX ADMIN — Medication SCH ML: at 19:44

## 2020-07-27 RX ADMIN — ACYCLOVIR SCH MG: 200 CAPSULE ORAL at 16:05

## 2020-07-27 RX ADMIN — NYSTATIN SCH UNIT: 100000 SUSPENSION ORAL at 08:49

## 2020-07-27 RX ADMIN — ISODIUM CHLORIDE SCH MG: 0.03 SOLUTION RESPIRATORY (INHALATION) at 18:40

## 2020-07-27 RX ADMIN — SODIUM CHLORIDE SCH MLS/HR: 9 INJECTION, SOLUTION INTRAVENOUS at 04:27

## 2020-07-27 RX ADMIN — ISODIUM CHLORIDE SCH MG: 0.03 SOLUTION RESPIRATORY (INHALATION) at 12:43

## 2020-07-27 RX ADMIN — NYSTATIN SCH UNIT: 100000 SUSPENSION ORAL at 21:56

## 2020-07-27 RX ADMIN — ACYCLOVIR SCH MG: 200 CAPSULE ORAL at 21:55

## 2020-07-27 RX ADMIN — Medication SCH ML: at 18:01

## 2020-07-27 RX ADMIN — NYSTATIN SCH UNIT: 100000 SUSPENSION ORAL at 12:25

## 2020-07-27 RX ADMIN — CEFEPIME HYDROCHLORIDE SCH MLS/HR: 2 INJECTION, POWDER, FOR SOLUTION INTRAVENOUS at 08:53

## 2020-07-27 RX ADMIN — CEFEPIME HYDROCHLORIDE SCH MLS/HR: 2 INJECTION, POWDER, FOR SOLUTION INTRAVENOUS at 16:05

## 2020-07-27 RX ADMIN — Medication SCH ML: at 23:34

## 2020-07-27 RX ADMIN — ACYCLOVIR SCH MG: 200 CAPSULE ORAL at 08:45

## 2020-07-27 RX ADMIN — DICYCLOMINE HYDROCHLORIDE SCH MG: 20 TABLET ORAL at 18:01

## 2020-07-27 RX ADMIN — POTASSIUM CHLORIDE SCH MEQ: 20 TABLET, EXTENDED RELEASE ORAL at 08:48

## 2020-07-27 RX ADMIN — ACETAMINOPHEN PRN MG: 325 TABLET, FILM COATED ORAL at 19:42

## 2020-07-27 RX ADMIN — THERA TABS SCH EACH: TAB at 08:51

## 2020-07-27 RX ADMIN — DICYCLOMINE HYDROCHLORIDE SCH MG: 20 TABLET ORAL at 21:55

## 2020-07-27 RX ADMIN — DICYCLOMINE HYDROCHLORIDE SCH MG: 20 TABLET ORAL at 08:50

## 2020-07-27 RX ADMIN — PANTOPRAZOLE SODIUM SCH MG: 40 TABLET, DELAYED RELEASE ORAL at 08:55

## 2020-07-27 RX ADMIN — FUROSEMIDE SCH MG: 40 TABLET ORAL at 08:46

## 2020-07-27 RX ADMIN — ISODIUM CHLORIDE SCH MG: 0.03 SOLUTION RESPIRATORY (INHALATION) at 08:34

## 2020-07-27 RX ADMIN — NYSTATIN SCH UNIT: 100000 SUSPENSION ORAL at 18:01

## 2020-07-27 RX ADMIN — DICYCLOMINE HYDROCHLORIDE SCH MG: 20 TABLET ORAL at 12:25

## 2020-07-27 RX ADMIN — PIPERACILLIN AND TAZOBACTAM SCH MLS/HR: 3; .375 INJECTION, POWDER, FOR SOLUTION INTRAVENOUS at 23:32

## 2020-07-27 NOTE — PN
PROGRESS NOTE



DATE OF SERVICE:

07/27/2020



REASON FOR FOLLOWUP:

Febrile neutropenia and pneumonia.



INTERVAL HISTORY:

The patient did spike a fever of 102.8 degrees Fahrenheit this afternoon.  The patient

denies having any chest pain or shortness of breath.  He did have a cough, not bringing

up any sputum. No nausea, no vomiting. No abdominal pain. No diarrhea.



PHYSICAL EXAMINATION:

Blood pressure 102/56, pulse of 69, temperature 99.4.  He is 99% on 2 L nasal cannula.

General description is an elderly male lying in bed in no distress.

RESPIRATORY SYSTEM: Unlabored breathing, decreased breath sounds at the bases. No

wheeze.

HEART: S1, S2.  Regular rate and rhythm.

ABDOMEN:  Soft, no tenderness.



LABS:

Hemoglobin 7, white count 0.5, BUN of 35, creatinine 1.21.



DIAGNOSTIC IMPRESSION AND PLAN:

Patient with febrile neutropenia in this patient who did have evidence of cavitary

pneumonia which is mostly on the left lower lobe in this patient.  Previously lesion on

the right lung has shown improvement though as the CT was reviewed with the

radiologist.  We will switch antibiotic therapy to Zosyn with concern for possible

aspiration pneumonitis and workup will be done for possible aspergillosis as the

patient has been neutropenic for a longtime and may benefit from pulmonary evaluation

and consideration for possible bronchoscopy and biopsy.





MMODL / IJN: [id number] / Job#: [id number]

## 2020-07-27 NOTE — P.PN
Subjective


Progress Note Date: 07/27/20


Principal diagnosis: 


 Weakness, shortness of breath





This is a very pleasant 70-year-old gentleman who follows with Dr. Luu is 

his primary care provider.  He has a history of gout, chronic and ongoing 

tobacco dependence, and myelodysplastic syndrome, high-grade and has been seen 

by medical oncology.  He has received a second round of chemotherapy with G-CSF 

approximately a week ago.  He did receive Neulasta as well.  He presented to the

emergency room on 07/23/2020 after being told by his PCP his hemoglobin was 

quite low.  The patient had been having weakness and shortness of breath.  

Initial labs revealed a WBC of 1.3.  Hemoglobin 5.1.  Platelets 28,000.  He is 

status post 2 units of packed red blood cells.  A third unit of packed red blood

cells as ordered for today for hemoglobin is 6.0.  He will be receiving 

platelets for a platelet count of 9000.  Yesterday he had undergone a computed 

tomography scan of the chest that revealed multiple pulmonary abnormalities 

including a spiculated 16 x 10 x 30 mm infiltrate of the right upper lobe.  

There is a 1.5 cm noncalcified subpleural nodular density in the posterior 

aspect of the right midlung in the posterior segment right upper lobe.  There is

a 2.3 cm cavitating spiculated infiltrate of the left lower lobe posterior basal

segment.  Extensive calcified pleural plaque at the lung bases.  No pleural 

effusion.  No hilar masses.  Few mediastinal lymph nodes measuring 1 cm.  We are

consulted for the same.  The patient is seen today on the oncology unit.  He is 

currently resting comfortably in bed.  Awake and alert in no acute distress.  

Somewhat of a poor historian.  His shortness of breath is about the same.  No 

cough or congestion.  No fever chills or night sweats.  Temperature last evening

was 100.8.  Maintaining O2 saturations in the mid 90s on 3 L/m per nasal 

cannula.  Systolic blood pressure in the 90s.  Blood cultures revealing no 

growth to date.  White count 0.7.  Hemoglobin 6.0.  Platelets 9000.  Sodium 137.

 Potassium 4.7.  Creatinine 1.22.





On 07/27/2020 patient seen in follow-up on a general medical oncology floor, he 

is awake and alert, in no acute distress, he is resting comfortably in bed, he 

is on 2 L of oxygen his pulse ox is 96%, afebrile, hemodynamically stable, 

although this morning his systolic was in the 90s and this afternoon his 

systolic is 87, and the patient is asymptomatic.  Today's lab work has been 

reviewed, and patient is still pancytopenic, with a white blood cell, 0.5, 

hemoglobin is 7.0, and platelet count is 9.  His renal profile is stable, would 

be on of 35, and creatinine is 1.21, sodium is 137, potassium is 4.2, chloride 

is 108.  His blood and sputum cultures have shown no growth so far.  Patient is 

on a combination of cefepime and vancomycin.  He is on a daily dose of oral 

Lasix.  His lung sounds are markedly diminished, no rhonchi or wheezing, no 

cough or congestion, his CT chest on 07/25/2020 was reviewed showing spiculated 

16 x 10 x 30 mm infiltrate in the right upper lobe, 1.5 cm noncalcified 

subpleural nodular density in the posterior aspect of right midlung and 2.3 cm 

cavitating spiculated infiltrate in the left lower lobe posterior basal segment.

 Clinically patient appears to be stable, no nausea vomiting no diarrhea, no 

altered mentation.  No worsening dyspnea.











Objective





- Vital Signs


Vital signs: 


                                   Vital Signs











Temp  98.9 F   07/27/20 13:00


 


Pulse  77   07/27/20 13:00


 


Resp  16   07/27/20 13:00


 


BP  87/45   07/27/20 13:00


 


Pulse Ox  96   07/27/20 13:00








                                 Intake & Output











 07/26/20 07/27/20 07/27/20





 18:59 06:59 18:59


 


Intake Total 1322 350 100


 


Output Total  100 


 


Balance 1322 250 100


 


Weight 55.792 kg  


 


Intake:   


 


  Intake, IV Titration 200 350 100





  Amount   


 


    Cefepime 2 gm In Sodium 200 100 100





    Chloride 0.9% 100 ml @   





    200 mls/hr IVPB Q8HR [facility]   





    Rx#:[id number]   


 


    Vancomycin 1,000 mg In  250 





    Sodium Chloride 0.9% 250   





    ml @ 125 mls/hr IVPB Q12H   





    [facility] Rx#:[id number]   


 


  Oral 600  


 


  Blood Product 522  


 


    Platelet Irr Pheresis 212  





    Acda1  Unit [id number]   


 


    Rc Irr As1  Unit 310  





    [id number]   


 


Output:   


 


  Urine  100 


 


Other:   


 


  Voiding Method Toilet Urinal 





 Urinal  


 


  # Voids 2 1 














- Exam


 GENERAL EXAM: Alert, very pleasant, 70-year-old white male, on 2 L of oxygen 

and the pulse ox of 96% comfortable in no apparent distress.


HEAD: Normocephalic/atraumatic.


EYES: Normal reaction of pupils, equal size.  Conjunctiva pink, sclera white.


NOSE: Clear with pink turbinates.


THROAT: No erythema or exudates.


NECK: No masses, no JVD, no thyroid enlargement, no adenopathy.


CHEST: No chest wall deformity.  Symmetrical expansion. 


LUNGS: Equal air entry with markedly diminished breath sounds bilaterally


CVS: Regular rate and rhythm, normal S1 and S2, no gallops, no murmurs, no rubs


ABDOMEN: Soft, nontender.  No hepatosplenomegaly, normal bowel sounds, no 

guarding or rigidity.


EXTREMITIES: No clubbing, no edema, no cyanosis, 2+ pulses and upper and lower 

extremities.


MUSCULOSKELETAL: Muscle strength and tone normal.


SPINE: No scoliosis or deformity


SKIN: No rashes


CENTRAL NERVOUS SYSTEM: Alert and oriented -3.  No focal deficits, tone is 

normal in all 4 extremities.


PSYCHIATRIC: Alert and oriented -3.  Appropriate affect.  Intact judgment and 

insight.











- Labs


CBC & Chem 7: 


                                 07/27/20 12:06





                                 07/27/20 12:06


Labs: 


                  Abnormal Lab Results - Last 24 Hours (Table)











  07/23/20 07/26/20 07/27/20 Range/Units





  13:39 17:47 12:06 


 


WBC   0.5 L*  0.5 L*  (3.8-10.6)  k/uL


 


RBC   2.21 L  2.10 L  (4.30-5.90)  m/uL


 


Hgb   7.4 L  7.0 L  (13.0-17.5)  gm/dL


 


Hct   22.6 L  21.3 L  (39.0-53.0)  %


 


MCV   102.3 H  101.7 H  (80.0-100.0)  fL


 


RDW   22.6 H  22.2 H  (11.5-15.5)  %


 


Plt Count   17 L* D  9 L*  (150-450)  k/uL


 


Chloride     ()  mmol/L


 


BUN     (9-20)  mg/dL


 


Glucose     (74-99)  mg/dL


 


Calcium     (8.4-10.2)  mg/dL


 


Total Protein     (6.3-8.2)  g/dL


 


Albumin     (3.5-5.0)  g/dL


 


Crossmatch  See Detail    














  07/27/20 Range/Units





  12:06 


 


WBC   (3.8-10.6)  k/uL


 


RBC   (4.30-5.90)  m/uL


 


Hgb   (13.0-17.5)  gm/dL


 


Hct   (39.0-53.0)  %


 


MCV   (80.0-100.0)  fL


 


RDW   (11.5-15.5)  %


 


Plt Count   (150-450)  k/uL


 


Chloride  108 H  ()  mmol/L


 


BUN  35 H  (9-20)  mg/dL


 


Glucose  114 H  (74-99)  mg/dL


 


Calcium  7.8 L  (8.4-10.2)  mg/dL


 


Total Protein  5.6 L  (6.3-8.2)  g/dL


 


Albumin  2.4 L  (3.5-5.0)  g/dL


 


Crossmatch   








                      Microbiology - Last 24 Hours (Table)











 07/26/20 11:45 Gram Stain - Final





 Sputum Sputum Culture - Final


 


 07/24/20 20:34 Blood Culture - Preliminary





 Blood    No Growth after 48 hours


 


 07/25/20 17:56 Blood Culture - Preliminary





 Blood    No Growth after 24 hours














Assessment and Plan


Plan: 


 Assessment:





1 Pancytopenia secondary to antineoplastic chemotherapy for myelodysplastic 

syndrome.  He had been treated with G CSF 2.  Received Neulasta.





2 Anemia secondary to above presenting hemoglobin 5.1.  Status post 2 units 

packed red blood cells with improvement of 7.3.  Today's hemoglobin 6.0.  Status

post transfusion with 3 units of packed red blood cells





3 Thrombocytopenia secondary to above current platelet count 19,000.  Platelet 

transfusion





4 Leukopenia secondary to above.





5 Multiple pulmonary infiltrates including a spiculated 16 x 10 x 30 mm 

infiltrate in the right upper lobe.  A 1.5 cm noncalcified subpleural nodular 

density in the posterior segment of the right upper lobe.  7 mm noncalcified 

nodule of the right lower lobe superior segment.  A 2.3 cm cavitating spiculated

infiltrate of the left lower lobe posterior basal segment.  There is extensive 

calcified pleural plaque of the lung bases.  No hilar mass.  Few mediastinal 

lymph nodes that measure 1 cm.





6 Chronic tobacco dependence of greater than 60 years





7 Daily alcohol use





Plan:





Continue current antibiotics, so far the culture data remains negative, no 

worsening dyspnea, no complaints of cough or congestion, no hemoptysis, patient 

is on a combination of cefepime and vancomycin, no fever or chills, no 

complaints of chest pain.  Breathing seems to be comfortable.  Continue 

nebulized bronchodilators, continue same dose of oral Lasix, we'll continue to 

follow, prognosis is guarded.  No plans for bronchoscopy, will obtain follow-up 

CT scan in 3 months time.  Discharge planning is in progress for discharge home 

with palliative care





I performed a history & physical examination of the patient and discussed their 

management with my nurse practitioner, Louisa Zamora.  I reviewed the nurse 

practitioner's note and agree with the documented findings and plan of care.  

Lung sounds are positive for diffuse wheezes throughout the lung fields.  The 

findings and the impression was discussed with the patient.  I attest to the 

documentation by the nurse practitioner. 





Time with Patient: Less than 30

## 2020-07-27 NOTE — P.PN
Subjective


Progress Note Date: 07/27/20


Principal diagnosis: 





Pancytopenia status post treatment of MDS





In follow-up today patient denies any complaints, no recent fevers, he states he

has no appetite, denies nausea, vomiting, he is starting to expectorate, no 

hemoptysis, abdominal pain, acute changes in bowel or bladder habits.  Patient 

is able to ambulate independently.





Objective





- Vital Signs


Vital signs: 


                                   Vital Signs











Temp  98.9 F   07/27/20 13:00


 


Pulse  77   07/27/20 13:00


 


Resp  16   07/27/20 13:00


 


BP  87/45   07/27/20 13:00


 


Pulse Ox  96   07/27/20 13:00








                                 Intake & Output











 07/26/20 07/27/20 07/27/20





 18:59 06:59 18:59


 


Intake Total 1322 350 100


 


Output Total  100 


 


Balance 1322 250 100


 


Weight 55.792 kg  


 


Intake:   


 


  Intake, IV Titration 200 350 100





  Amount   


 


    Cefepime 2 gm In Sodium 200 100 100





    Chloride 0.9% 100 ml @   





    200 mls/hr IVPB Q8HR FARIHA   





    Rx#:[id number]   


 


    Vancomycin 1,000 mg In  250 





    Sodium Chloride 0.9% 250   





    ml @ 125 mls/hr IVPB Q12H   





    FARIHA Rx#:[id number]   


 


  Oral 600  


 


  Blood Product 522  


 


    Platelet Irr Pheresis 212  





    Acda1  Unit [id number]   


 


    Rc Irr As1  Unit 310  





    [id number]   


 


Output:   


 


  Urine  100 


 


Other:   


 


  Voiding Method Toilet Urinal 





 Urinal  


 


  # Voids 2 1 














- Constitutional


General appearance: Present: cooperative, no acute distress, thin





- EENT


Eyes: Present: anicteric sclerae, edentulous


ENT: Present: hearing grossly normal, thrush





- Respiratory


Respiratory: bilateral: diminished





- Cardiovascular


Rhythm: regular


Heart sounds: normal: S1, S2


Abnormal Heart Sounds: Absent: systolic murmur, diastolic murmur, rub, S3 

Gallop, S4 Gallop, click, other





- Peripheral edema


  ** leg


Peripheral Edema: bilateral: None





- Gastrointestinal


General gastrointestinal: Present: normal bowel sounds, soft





- Neurologic


Neurologic: Present: CNII-XII intact





- Musculoskeletal


Musculoskeletal: Present: strength equal bilaterally





- Psychiatric


Psychiatric: Present: A&O x's 3, appropriate affect, intact judgment & insight





- Labs


CBC & Chem 7: 


                                 07/27/20 12:06





                                 07/27/20 12:06


Labs: 


                  Abnormal Lab Results - Last 24 Hours (Table)











  07/23/20 07/26/20 07/27/20 Range/Units





  13:39 17:47 12:06 


 


WBC   0.5 L*  0.5 L*  (3.8-10.6)  k/uL


 


RBC   2.21 L  2.10 L  (4.30-5.90)  m/uL


 


Hgb   7.4 L  7.0 L  (13.0-17.5)  gm/dL


 


Hct   22.6 L  21.3 L  (39.0-53.0)  %


 


MCV   102.3 H  101.7 H  (80.0-100.0)  fL


 


RDW   22.6 H  22.2 H  (11.5-15.5)  %


 


Plt Count   17 L* D  9 L*  (150-450)  k/uL


 


Chloride     ()  mmol/L


 


BUN     (9-20)  mg/dL


 


Glucose     (74-99)  mg/dL


 


Calcium     (8.4-10.2)  mg/dL


 


Total Protein     (6.3-8.2)  g/dL


 


Albumin     (3.5-5.0)  g/dL


 


Crossmatch  See Detail    














  07/27/20 Range/Units





  12:06 


 


WBC   (3.8-10.6)  k/uL


 


RBC   (4.30-5.90)  m/uL


 


Hgb   (13.0-17.5)  gm/dL


 


Hct   (39.0-53.0)  %


 


MCV   (80.0-100.0)  fL


 


RDW   (11.5-15.5)  %


 


Plt Count   (150-450)  k/uL


 


Chloride  108 H  ()  mmol/L


 


BUN  35 H  (9-20)  mg/dL


 


Glucose  114 H  (74-99)  mg/dL


 


Calcium  7.8 L  (8.4-10.2)  mg/dL


 


Total Protein  5.6 L  (6.3-8.2)  g/dL


 


Albumin  2.4 L  (3.5-5.0)  g/dL


 


Crossmatch   








                      Microbiology - Last 24 Hours (Table)











 07/26/20 11:45 Gram Stain - Final





 Sputum Sputum Culture - Final


 


 07/24/20 20:34 Blood Culture - Preliminary





 Blood    No Growth after 48 hours


 


 07/25/20 17:56 Blood Culture - Preliminary





 Blood    No Growth after 24 hours














Assessment and Plan


(1) Pancytopenia due to antineoplastic chemotherapy


Narrative/Plan: 


Transfuse for hemoglobin less than 7 or symptomatic.  No transfusion today, 

hemoglobin 7





Transfuse for platelets less than 10,000 unless symptomatic.  Patient is to be 

transfused with 1 unit single donor platelets for platelet count of 9000 today.





Patient did receive G-CSF after treatment.  That was on the 18th.  Patient will 

receive short acting G-CSF starting tomorrow.


Current Visit: Yes   Status: Chronic   Priority: Medium   Code(s): D61.810 - 

ANTINEOPLASTIC CHEMOTHERAPY INDUCED PANCYTOPENIA; T45.1X5A - ADVERSE EFFECT OF 

ANTINEOPLASTIC AND IMMUNOSUP DRUGS, INIT   SNOMED Code(s): 136426290502508


   





(2) MDS (myelodysplastic syndrome), high grade


Narrative/Plan: 


Patient is now status post 2 of the recommended 3 treatments who suggested by he

monitored to see if we can get some control of his disease.  I saw patient week 

after treatment last week and his counts actually were pretty decent and he was 

feeling pretty good.  Unfortunately, he is now in [place].  We will see how well 

he is able to recuperate.  Further recommendations for treatment to follow.








Current Visit: No   Status: Chronic   Priority: Medium   Code(s): D46.Z - OTHER 

MYELODYSPLASTIC SYNDROMES   SNOMED Code(s): 011980894


   





(3) Thrush, oral


Narrative/Plan: 


Mild, coating the tongue.  Could be contributing to some of the patient's poor 

taste.  Patient is being treated with the oral suspension.  We will add salt and

baking soda for cleansing of the mouth.  Would recommend brushing the teeth at 

least twice a day, obtain a new toothbrush especially at home, try brushing the 

tongue if able.


Current Visit: Yes   Status: Acute   Priority: Medium   Code(s): B37.0 - 

CANDIDAL STOMATITIS   SNOMED Code(s): 49447442


   


Plan: 





Did discuss the case with Infectious Disease.  ID was going to review the 

computed tomography scan, pending sputum cultures, may consider bronchoscopy 

versus percutaneous biopsy to see what is going on in the patient's lung.  Pe

nding their recommendations.

## 2020-07-27 NOTE — P.PN
Subjective


Progress Note Date: 07/27/20








70-year-old male one of Dr. Luu's patient with past medical history of 

anemia, gout, tobacco use, daily alcohol use and myelodysplasia who has been set

pancytopenic for the last 2 month was started on chemotherapy with hematology 

recently was hospitalized for extended period of time for severe pancytopenia 

and severe neutropenia did not recover.  Patient apparently went back on 

chemotherapy after he left the hospital last time.  Today found to have severe 

anemia with hemoglobin of 5.1 with severe neutropenia white blood cell 1.3 and 

thrombocytopenia with platelets 28,000 only.  Patient brought to the [facility] emergency department where was seen and evaluated has been 

very weak has not been able to ambulate and walk and has been having worsening 

shortness of breath with dyspnea with minimal exertion for the last few days.  

With a current hemoglobin his oncologist and instructed him to come to the 

hospital for admission for transfusion.





7/24: Patient is seen today on the [facility] floor.  He is status post 2 units of 

packed RBCs and repeat lab work reveals hemoglobin of 7.3, WBC 0.9, platelet 

count 18.  Electrolytes normal, BUN 32 and creatinine 0.89.  Calcium 7.9, total 

bilirubin 1.5, AST 42, ALT 40, alkaline phosphatase 66.  Temperature max 100.0, 

heart rate 76, blood pressure 115/66, pulse ox 96% on room air.  Consult in 

place for oncology.  COVID-19 is pending.





7/25: Hemoglobin currently is at 7.1, platelet count of 16, double basic count 

of 0.7 oncology thinks that he has myelodysplasia,patient wants to stay another 

day just in case he needs another blood products, as it's difficult for him to 

be transported back and forth with the wife who has been on a wheelchair, 

patient has chronic cough from smoking, denies any feverhowever T-max is 100.0 

within the past 24 hours, Covid testc negative,chest x-ray shows no acute 

pulmonary disease, has clearing of pulmonary edema and pleural fluid compared to

old exam,urinalyses WBC of 2,blood cultures would be sent secondary to the 

fever, consult Dr. Hernandez for neutropenic fever, unknown primary at this 

time,start patient on cefotaxime,  Levaquin, we will obtain high resolution CT 

of the chest, CT of the abdomen fromJuly 2, 2020 was reviewed , CT June 28, 2 cm

stellate mass right upper lobe, 4 x 2 cm hypodensity posterior spleen no 

hydronephrosis no pulmonary has seen the patient, consult with Dr. Lu 

pulmonary





7/26: Patient is being transfused currently for hemoglobin of 6.0, along with pl

atelet transfusion, both are irritated, platelet is down to 9, currently on IV 

cefepime with Jovanni, Dr. Chaparro is following, CAT scan of the chest shows multiple 

pulmonary infiltrates including a spiculated lesion 16 x 10 x 13 mm right upper 

lobe, 1.5 cm noncalcified subpleural nodular density posterior segment right 

upper lobe, 7 mm noncalcified nodule right lower lobe, 2.3, dictation spiculated

left lower lobe, posterior basal segment, suspected inflammatory changes, Dr. Mckeon pulmonary has been consulted, with recommendations for follow-up CT in 3 

months, sputum culture sent today patient still has cough no hemoptysis, no 

conversational dyspnea, no active wheezing, T-max of 98.2, blood pressure is 

between 90-98 systolic pressures, nonlabored breathing pulse ox room air 98%





7/27: Patient has been seen by Dr. Mckeon with recommendations for 3 month 

follow-up for repeat CAT scan.  Patient has been continued on vancomycin, and 

cefepime by Dr. Riddle 8.  We will discontinue the Levaquin that was added by 

oncology for prophylaxis.  Patient does have a temperature max of 102.6, heart 

rate 79, blood pressure 95/53, pulse ox 97% on room air.  Repeat blood work will

be ordered for today.  Patient has remained pancytopenic over the weekend.





Review of Systems


CONSTITUTIONAL: Well-developed no acute respiratory distress.  Denies chills.  

Documented fever.


EYES: No icterus sclerae, no conjunctivitis.


EARS, NOSE, MOUTH, THROAT, and FACE: No sore throat, lymphadenopathy, carotid 

bruits or deformity.


RESPIRATORY: Positive shortness of breath cough wheezes.


CARDIOVASCULAR: No CP, Palpitation, PND, Orthopnea, or angina.


GASTROINTESTINAL: Mild change in bowel habit with mild diarrhea and slight 

dyspepsia


GENITOURINARY: Negative for Hematuria or UTI, no kidney stones.


INTEGUMENT/BREAST: Negative for any muscular injury with mild osteoarthritis..


HEMATOLOGIC/LYMPHATIC: History of myelodysplasia severe anemia thrombocytopenia 

and neutropenia.


MUSCULOSKELTAL: Negative for Myalgia or arthralgia.


NEURLOGICAL: No LOC, Sz or syncope, blurred vision dizziness or abnormality..


BEHAVIORAL/PSYCH: Negative.


ENDOCRINE: Negative.





Physical Examination


General Appearance: Alert, cooperative, no distress, appears stated age.  Patien

t sitting on the edge of the bed and appears to be in no acute distress.


Neck HEENT: Supple, no lymphadenopathy, no thyroid enlargement, no carotid 

bruits.


Lungs: Decreased breaths on bilaterally with fine rhonchi positive mild 

expiratory wheezes.


Chest Wall: Decrease expansion with deep inspiration no tenderness and no 

deformity was found on exam, no costochondral pain.


Heart: Regular rate and rhythm, S1, S2 mild tachycardia with systolic murmur.


Back: Symmetric, no curvature, ROM normal, no CVA tenderness.


Abdomen: Soft positive bowel sound slight discomfort in the mid abdominal area 

with mild organomegaly with multiple bruises no rebound or rigidity.


Extremities: Extremities trace edema with multiple bruises positive mild 

ecchymosis as well.


Pulses: 2+ and symmetric.


Skin: Skin color, texture, tugor normal, no rashes or lesions.


Neurologic: Alert oriented x3 cranial nerves II through XII intact, no motor 

deficit, no abnormal balance or gait.








Assessment and Plan


1 acute symptomatic anemia.  Patient is status post transfusion of 3 units 

packed RBCs.  Continue Protonix.





2 severe myelodysplasia: Has been on chemotherapy and seen hematology regular 

basis we'll consult hematology.  Continue cefepime.





3 severe neutropenia.  Oncology consult, expect oncology to start Neulasta daily

until the white blood cell is elevated.  In the meanwhile patient will go back 

on acyclovir, Diflucan and probably need to stay on cefepime.





4 .  Pancytopenia.  Patient is status post transfusion of 3 units packed RBCs 

and 1 unit of platelets.  Patient is followed by oncology.





5 COPD without exacerbation.  Continue albuterol nebulizer 3 times daily.  





6 chronic edema: More diastolic congestive heart failure and chronic continue 

furosemide at 40 mg daily.





7 recurrent pneumonia: Patient is asymptomatic at this point.  Continue inhaler 

and antibiotics.





8 GI prophylaxis: pantoprazole 40 mg daily.





9 DVT prophylaxis: Patient will stay on Venodyne boots and knee-high HAKAN hose.





10 COVID-19 infection not present.





CODE STATUS: Full code.





Discharge plan: [facility] care and palliative care.





Impression and plan of care have been directed as dictated by the signing 

physician.  Brittney Bennett nurse practitioner acting as scribe for signing 

physician.





Objective





- Vital Signs


Vital signs: 


                                   Vital Signs











Temp  99.6 F   07/27/20 05:00


 


Pulse  79   07/27/20 05:00


 


Resp  18   07/27/20 05:00


 


BP  95/53   07/27/20 05:00


 


Pulse Ox  97   07/27/20 05:00








                                 Intake & Output











 07/26/20 07/27/20 07/27/20





 18:59 06:59 18:59


 


Intake Total 1322 350 


 


Output Total  100 


 


Balance 1322 250 


 


Weight 55.792 kg  


 


Intake:   


 


  Intake, IV Titration 200 350 





  Amount   


 


    Cefepime 2 gm In Sodium 200 100 





    Chloride 0.9% 100 ml @   





    200 mls/hr IVPB Q8HR FARIHA   





    Rx#:[id number]   


 


    Vancomycin 1,000 mg In  250 





    Sodium Chloride 0.9% 250   





    ml @ 125 mls/hr IVPB Q12H   





    FARIHA Rx#:[id number]   


 


  Oral 600  


 


  Blood Product 522  


 


    Platelet Irr Pheresis 212  





    Acda1  Unit [id number]   


 


    Rc Irr As1  Unit 310  





    [id number]   


 


Output:   


 


  Urine  100 


 


Other:   


 


  Voiding Method Toilet Urinal 





 Urinal  


 


  # Voids 2 1 














- Labs


CBC & Chem 7: 


                                 07/26/20 17:47





                                 07/26/20 06:34


Labs: 


                  Abnormal Lab Results - Last 24 Hours (Table)











  07/23/20 07/26/20 07/26/20 Range/Units





  13:39 06:34 06:34 


 


WBC   0.7 L*   (3.8-10.6)  k/uL


 


RBC   1.87 L   (4.30-5.90)  m/uL


 


Hgb   6.0 L*   (13.0-17.5)  gm/dL


 


Hct   19.0 L*   (39.0-53.0)  %


 


MCV   101.5 H   (80.0-100.0)  fL


 


RDW   24.2 H   (11.5-15.5)  %


 


Plt Count   9 L*   (150-450)  k/uL


 


BUN    34 H  (9-20)  mg/dL


 


Calcium    7.9 L  (8.4-10.2)  mg/dL


 


Total Protein    5.7 L  (6.3-8.2)  g/dL


 


Albumin    2.5 L  (3.5-5.0)  g/dL


 


Crossmatch  See Detail    














  07/26/20 Range/Units





  17:47 


 


WBC  0.5 L*  (3.8-10.6)  k/uL


 


RBC  2.21 L  (4.30-5.90)  m/uL


 


Hgb  7.4 L  (13.0-17.5)  gm/dL


 


Hct  22.6 L  (39.0-53.0)  %


 


MCV  102.3 H  (80.0-100.0)  fL


 


RDW  22.6 H  (11.5-15.5)  %


 


Plt Count  17 L* D  (150-450)  k/uL


 


BUN   (9-20)  mg/dL


 


Calcium   (8.4-10.2)  mg/dL


 


Total Protein   (6.3-8.2)  g/dL


 


Albumin   (3.5-5.0)  g/dL


 


Crossmatch   








                      Microbiology - Last 24 Hours (Table)











 07/26/20 11:45 Gram Stain - Final





 Sputum Sputum Culture - Final


 


 07/24/20 20:34 Blood Culture - Preliminary





 Blood    No Growth after 48 hours


 


 07/25/20 17:56 Blood Culture - Preliminary





 Blood    No Growth after 24 hours

## 2020-07-28 LAB
ALBUMIN SERPL-MCNC: 2.6 G/DL (ref 3.5–5)
ALP SERPL-CCNC: 57 U/L (ref 38–126)
ALT SERPL-CCNC: 34 U/L (ref 4–49)
ANION GAP SERPL CALC-SCNC: 4 MMOL/L
AST SERPL-CCNC: 39 U/L (ref 17–59)
BUN SERPL-SCNC: 30 MG/DL (ref 9–20)
CALCIUM SPEC-MCNC: 8.3 MG/DL (ref 8.4–10.2)
CHLORIDE SERPL-SCNC: 107 MMOL/L (ref 98–107)
CO2 SERPL-SCNC: 26 MMOL/L (ref 22–30)
ERYTHROCYTE [DISTWIDTH] IN BLOOD BY AUTOMATED COUNT: 2 M/UL (ref 4.3–5.9)
ERYTHROCYTE [DISTWIDTH] IN BLOOD: 22.5 % (ref 11.5–15.5)
GLUCOSE SERPL-MCNC: 105 MG/DL (ref 74–99)
HCT VFR BLD AUTO: 20.4 % (ref 39–53)
HGB BLD-MCNC: 6.7 GM/DL (ref 13–17.5)
MCH RBC QN AUTO: 33.5 PG (ref 25–35)
MCHC RBC AUTO-ENTMCNC: 32.8 G/DL (ref 31–37)
MCV RBC AUTO: 102.2 FL (ref 80–100)
PLATELET # BLD AUTO: 23 K/UL (ref 150–450)
POTASSIUM SERPL-SCNC: 4.1 MMOL/L (ref 3.5–5.1)
PROT SERPL-MCNC: 5.9 G/DL (ref 6.3–8.2)
SODIUM SERPL-SCNC: 137 MMOL/L (ref 137–145)
WBC # BLD AUTO: 0.5 K/UL (ref 3.8–10.6)

## 2020-07-28 RX ADMIN — DICYCLOMINE HYDROCHLORIDE SCH MG: 20 TABLET ORAL at 22:08

## 2020-07-28 RX ADMIN — DICYCLOMINE HYDROCHLORIDE SCH MG: 20 TABLET ORAL at 13:05

## 2020-07-28 RX ADMIN — NYSTATIN SCH UNIT: 100000 SUSPENSION ORAL at 13:05

## 2020-07-28 RX ADMIN — ISODIUM CHLORIDE SCH MG: 0.03 SOLUTION RESPIRATORY (INHALATION) at 12:43

## 2020-07-28 RX ADMIN — ISODIUM CHLORIDE SCH MG: 0.03 SOLUTION RESPIRATORY (INHALATION) at 08:43

## 2020-07-28 RX ADMIN — DICYCLOMINE HYDROCHLORIDE SCH MG: 20 TABLET ORAL at 08:42

## 2020-07-28 RX ADMIN — Medication SCH ML: at 11:06

## 2020-07-28 RX ADMIN — PIPERACILLIN AND TAZOBACTAM SCH MLS/HR: 3; .375 INJECTION, POWDER, FOR SOLUTION INTRAVENOUS at 16:05

## 2020-07-28 RX ADMIN — DICYCLOMINE HYDROCHLORIDE SCH MG: 20 TABLET ORAL at 16:05

## 2020-07-28 RX ADMIN — ACYCLOVIR SCH MG: 200 CAPSULE ORAL at 22:08

## 2020-07-28 RX ADMIN — THERA TABS SCH EACH: TAB at 08:43

## 2020-07-28 RX ADMIN — ISODIUM CHLORIDE SCH MG: 0.03 SOLUTION RESPIRATORY (INHALATION) at 18:47

## 2020-07-28 RX ADMIN — FUROSEMIDE SCH: 40 TABLET ORAL at 08:44

## 2020-07-28 RX ADMIN — POTASSIUM CHLORIDE SCH: 20 TABLET, EXTENDED RELEASE ORAL at 08:45

## 2020-07-28 RX ADMIN — NYSTATIN SCH UNIT: 100000 SUSPENSION ORAL at 22:08

## 2020-07-28 RX ADMIN — ACYCLOVIR SCH MG: 200 CAPSULE ORAL at 16:05

## 2020-07-28 RX ADMIN — Medication SCH ML: at 05:26

## 2020-07-28 RX ADMIN — ACYCLOVIR SCH MG: 200 CAPSULE ORAL at 08:42

## 2020-07-28 RX ADMIN — Medication SCH ML: at 19:04

## 2020-07-28 RX ADMIN — NYSTATIN SCH UNIT: 100000 SUSPENSION ORAL at 16:05

## 2020-07-28 RX ADMIN — PANTOPRAZOLE SODIUM SCH MG: 40 TABLET, DELAYED RELEASE ORAL at 08:14

## 2020-07-28 RX ADMIN — PIPERACILLIN AND TAZOBACTAM SCH MLS/HR: 3; .375 INJECTION, POWDER, FOR SOLUTION INTRAVENOUS at 08:14

## 2020-07-28 RX ADMIN — Medication SCH ML: at 23:48

## 2020-07-28 RX ADMIN — NYSTATIN SCH UNIT: 100000 SUSPENSION ORAL at 08:43

## 2020-07-28 RX ADMIN — Medication SCH ML: at 16:05

## 2020-07-28 NOTE — PN
PROGRESS NOTE



DATE OF SERVICE:

07/28/2020



REASON FOR FOLLOWUP:

Febrile neutropenia and pneumonia.



INTERVAL HISTORY:

The patient overall feels better and has improved. The last temperature has been around

4 in the morning of 100.4. The patient denies having any chest pain or shortness of

breath.  He did have some cough, not bringing up any sputum.  No nausea, no vomiting.

No abdominal pain, no diarrhea.



PHYSICAL EXAMINATION:

Blood pressure 99/38 with a pulse of 60, temperature 98.5. He is 92% on room air.

General description is an elderly male lying in bed in no distress.

RESPIRATORY SYSTEM: Unlabored breathing, decreased intensity of breath sounds. No

wheeze.

HEART: S1, S2.  Regular rate and rhythm.

ABDOMEN:  Soft, no tenderness.



LABS:

Creatinine 1.25.  CRP is 209.  Sed rate is 116.  White count 0.5.



DIAGNOSTIC IMPRESSION AND PLAN:

Patient with febrile neutropenia with concern for the multiple pulmonary on his

pneumonia with some cavitary. Cultures are currently pending.  Patient is on Zosyn to

continue and monitor clinical course closely.





MMODL / IJN: [id number] / Job#: [id number]

## 2020-07-28 NOTE — P.PN
Subjective


Progress Note Date: 07/28/20








70-year-old male one of Dr. Luu's patient with past medical history of 

anemia, gout, tobacco use, daily alcohol use and myelodysplasia who has been set

pancytopenic for the last 2 month was started on chemotherapy with hematology 

recently was hospitalized for extended period of time for severe pancytopenia 

and severe neutropenia did not recover.  Patient apparently went back on 

chemotherapy after he left the hospital last time.  Today found to have severe 

anemia with hemoglobin of 5.1 with severe neutropenia white blood cell 1.3 and 

thrombocytopenia with platelets 28,000 only.  Patient brought to the [facility] emergency department where was seen and evaluated has been 

very weak has not been able to ambulate and walk and has been having worsening 

shortness of breath with dyspnea with minimal exertion for the last few days.  

With a current hemoglobin his oncologist and instructed him to come to the 

hospital for admission for transfusion.





7/24: Patient is seen today on the [facility] floor.  He is status post 2 units of 

packed RBCs and repeat lab work reveals hemoglobin of 7.3, WBC 0.9, platelet 

count 18.  Electrolytes normal, BUN 32 and creatinine 0.89.  Calcium 7.9, total 

bilirubin 1.5, AST 42, ALT 40, alkaline phosphatase 66.  Temperature max 100.0, 

heart rate 76, blood pressure 115/66, pulse ox 96% on room air.  Consult in 

place for oncology.  COVID-19 is pending.





7/25: Hemoglobin currently is at 7.1, platelet count of 16, double basic count 

of 0.7 oncology thinks that he has myelodysplasia,patient wants to stay another 

day just in case he needs another blood products, as it's difficult for him to 

be transported back and forth with the wife who has been on a wheelchair, 

patient has chronic cough from smoking, denies any feverhowever T-max is 100.0 

within the past 24 hours, Covid testc negative,chest x-ray shows no acute 

pulmonary disease, has clearing of pulmonary edema and pleural fluid compared to

old exam,urinalyses WBC of 2,blood cultures would be sent secondary to the 

fever, consult Dr. Hernandez for neutropenic fever, unknown primary at this 

time,start patient on cefotaxime,  Levaquin, we will obtain high resolution CT 

of the chest, CT of the abdomen fromJuly 2, 2020 was reviewed , CT June 28, 2 cm

stellate mass right upper lobe, 4 x 2 cm hypodensity posterior spleen no 

hydronephrosis no pulmonary has seen the patient, consult with Dr. Tabitha peres





7/26: Patient is being transfused currently for hemoglobin of 6.0, along with pl

atelet transfusion, both are irritated, platelet is down to 9, currently on IV 

cefepime with Jovnani, Dr. Chaparro is following, CAT scan of the chest shows multiple 

pulmonary infiltrates including a spiculated lesion 16 x 10 x 13 mm right upper 

lobe, 1.5 cm noncalcified subpleural nodular density posterior segment right 

upper lobe, 7 mm noncalcified nodule right lower lobe, 2.3, dictation spiculated

left lower lobe, posterior basal segment, suspected inflammatory changes, Dr. Mckeon pulmonary has been consulted, with recommendations for follow-up CT in 3 

months, sputum culture sent today patient still has cough no hemoptysis, no 

conversational dyspnea, no active wheezing, T-max of 98.2, blood pressure is 

between 90-98 systolic pressures, nonlabored breathing pulse ox room air 98%





7/27: Patient has been seen by Dr. Mckeon with recommendations for 3 month 

follow-up for repeat CAT scan.  Patient has been continued on vancomycin, and 

cefepime by Dr. Hernandez.  We will discontinue the Levaquin that was added by 

oncology for prophylaxis.  Patient does have a temperature max of 102.6, heart 

rate 79, blood pressure 95/53, pulse ox 97% on room air.  Repeat blood work will

be ordered for today.  Patient has remained pancytopenic over the weekend.





7/28: Yesterday's blood work revealed a hemoglobin of 7, WBC 0.5, platelet count

9 and patient received 1 unit of platelets and oncology started Zarxio.  Repeat 

blood work for today reveals WBC of 0.5, hemoglobin 6.7, platelet count 23.  BUN

30 creatinine 1.25.  Blood sugar 105.  C-reactive protein is 209.5.  Patient 

denies any new complaints.  No chest pain or shortness of breath.  No cough.  

Temperature max is 102.8 at 7 PM, heart rate 72, blood pressure 88/37, pulse ox 

96% on room air. Pulmonary medicine does not plan for bronchoscopies at this 

time.  Dr. David has changed and biotics to Zosyn.





Review of Systems


CONSTITUTIONAL: Well-developed no acute respiratory distress.  Denies chills.  

Documented fever.


EYES: No icterus sclerae, no conjunctivitis.


EARS, NOSE, MOUTH, THROAT, and FACE: No sore throat, lymphadenopathy, carotid 

bruits or deformity.


RESPIRATORY: Positive shortness of breath cough wheezes.


CARDIOVASCULAR: No CP, Palpitation, PND, Orthopnea, or angina.


GASTROINTESTINAL: Mild change in bowel habit with mild diarrhea and slight 

dyspepsia


GENITOURINARY: Negative for Hematuria or UTI, no kidney stones.


INTEGUMENT/BREAST: Negative for any muscular injury with mild osteoarthritis..


HEMATOLOGIC/LYMPHATIC: History of myelodysplasia severe anemia thrombocytopenia 

and neutropenia.


MUSCULOSKELTAL: Negative for Myalgia or arthralgia.


NEURLOGICAL: No LOC, Sz or syncope, blurred vision dizziness or abnormality..


BEHAVIORAL/PSYCH: Negative.


ENDOCRINE: Negative.





Physical Examination


General Appearance: Alert, cooperative, no distress, appears stated age.  P

atient sitting on the edge of the bed and appears to be in no acute distress.


Neck HEENT: Supple, no lymphadenopathy, no thyroid enlargement, no carotid 

bruits.


Lungs: Decreased breaths bilaterally.


Chest Wall: Decrease expansion with deep inspiration no tenderness and no 

deformity was found on exam, no costochondral pain.


Heart: Regular rate and rhythm, S1, S2 mild tachycardia with systolic murmur.


Back: Symmetric, no curvature, ROM normal, no CVA tenderness.


Abdomen: Soft positive bowel sound slight discomfort in the mid abdominal area 

with mild organomegaly with multiple bruises no rebound or rigidity.


Extremities: Extremities trace edema with multiple bruises positive mild 

ecchymosis as well.


Pulses: 2+ and symmetric.


Skin: Skin color, texture, tugor normal, no rashes or lesions.


Neurologic: Alert oriented x3 cranial nerves II through XII intact, no motor 

deficit, no abnormal balance or gait.








Assessment and Plan


1 acute symptomatic anemia.  Patient is status post transfusion of 3 units 

packed RBCs.  Continue Protonix.





2 severe myelodysplasia.  Oncology consult appreciated.  Continue cefepime.





3 severe neutropenia.  Oncology consult, Zarxio started.  In the meanwhile 

patient will go back on acyclovir, Diflucan.  Antibiotics changed to Zosyn.





4.  Pancytopenia.  Patient is status post transfusion of 3 units packed RBCs and

2 unit of platelets.  Patient is followed by oncology.





5 COPD without exacerbation.  Continue albuterol nebulizer 3 times daily.  





6 chronic edema: More diastolic congestive heart failure and chronic continue 

furosemide at 40 mg daily.





7 recurrent pneumonia: Patient is asymptomatic at this point.  Continue inhaler 

and antibiotics.





8 GI prophylaxis: pantoprazole 40 mg daily.





9 DVT prophylaxis: Patient will stay on Venodyne boots and knee-high HAKAN hose.





10 COVID-19 infection not present.





CODE STATUS: Full code.





Discharge plan: [facility] care and palliative care.





Impression and plan of care have been directed as dictated by the signing 

physician.  Brittney Bennett nurse practitioner acting as scribe for signing 

physician.





Objective





- Vital Signs


Vital signs: 


                                   Vital Signs











Temp  98.5 F   07/28/20 07:35


 


Pulse  72   07/28/20 07:35


 


Resp  17   07/28/20 07:35


 


BP  88/37   07/28/20 07:35


 


Pulse Ox  96   07/28/20 04:45








                                 Intake & Output











 07/27/20 07/28/20 07/28/20





 18:59 06:59 18:59


 


Intake Total 580 1048 


 


Output Total  550 


 


Balance 580 498 


 


Intake:   


 


  Intake, IV Titration 100 100 





  Amount   


 


    Cefepime 2 gm In Sodium 100  





    Chloride 0.9% 100 ml @   





    200 mls/hr IVPB Q8HR [facility]   





    Rx#:[id number]   


 


    Piperacillin-Tazobactam 3  100 





    .375 gm In Sodium   





    Chloride 0.9% 100 ml @ 25   





    mls/hr IVPB Q8HR [facility] Rx#   





    :[id number]   


 


  Oral 480 240 


 


  Blood Product  708 


 


    Platelet Irr Pheresis  354 





    Acda  Unit [id number]   


 


Output:   


 


  Urine  550 


 


Other:   


 


  Voiding Method  Urinal 














- Labs


CBC & Chem 7: 


                                 07/28/20 08:29





                                 07/28/20 08:29


Labs: 


                  Abnormal Lab Results - Last 24 Hours (Table)











  07/27/20 07/27/20 Range/Units





  12:06 12:06 


 


WBC  0.5 L*   (3.8-10.6)  k/uL


 


RBC  2.10 L   (4.30-5.90)  m/uL


 


Hgb  7.0 L   (13.0-17.5)  gm/dL


 


Hct  21.3 L   (39.0-53.0)  %


 


MCV  101.7 H   (80.0-100.0)  fL


 


RDW  22.2 H   (11.5-15.5)  %


 


Plt Count  9 L*   (150-450)  k/uL


 


Chloride   108 H  ()  mmol/L


 


BUN   35 H  (9-20)  mg/dL


 


Glucose   114 H  (74-99)  mg/dL


 


Calcium   7.8 L  (8.4-10.2)  mg/dL


 


Total Protein   5.6 L  (6.3-8.2)  g/dL


 


Albumin   2.4 L  (3.5-5.0)  g/dL








                      Microbiology - Last 24 Hours (Table)











 07/24/20 20:34 Blood Culture - Preliminary





 Blood    No Growth after 72 hours


 


 07/25/20 17:56 Blood Culture - Preliminary





 Blood    No Growth after 48 hours


 


 07/26/20 11:45 Gram Stain - Final





 Sputum Sputum Culture - Final

## 2020-07-28 NOTE — P.PN
Subjective


Progress Note Date: 07/28/20


Principal diagnosis: 


 Weakness, shortness of breath





This is a very pleasant 70-year-old gentleman who follows with Dr. Luu is 

his primary care provider.  He has a history of gout, chronic and ongoing 

tobacco dependence, and myelodysplastic syndrome, high-grade and has been seen 

by medical oncology.  He has received a second round of chemotherapy with G-CSF 

approximately a week ago.  He did receive Neulasta as well.  He presented to the

emergency room on 07/23/2020 after being told by his PCP his hemoglobin was 

quite low.  The patient had been having weakness and shortness of breath.  

Initial labs revealed a WBC of 1.3.  Hemoglobin 5.1.  Platelets 28,000.  He is 

status post 2 units of packed red blood cells.  A third unit of packed red blood

cells as ordered for today for hemoglobin is 6.0.  He will be receiving 

platelets for a platelet count of 9000.  Yesterday he had undergone a computed 

tomography scan of the chest that revealed multiple pulmonary abnormalities 

including a spiculated 16 x 10 x 30 mm infiltrate of the right upper lobe.  

There is a 1.5 cm noncalcified subpleural nodular density in the posterior 

aspect of the right midlung in the posterior segment right upper lobe.  There is

a 2.3 cm cavitating spiculated infiltrate of the left lower lobe posterior basal

segment.  Extensive calcified pleural plaque at the lung bases.  No pleural 

effusion.  No hilar masses.  Few mediastinal lymph nodes measuring 1 cm.  We are

consulted for the same.  The patient is seen today on the oncology unit.  He is 

currently resting comfortably in bed.  Awake and alert in no acute distress.  

Somewhat of a poor historian.  His shortness of breath is about the same.  No 

cough or congestion.  No fever chills or night sweats.  Temperature last evening

was 100.8.  Maintaining O2 saturations in the mid 90s on 3 L/m per nasal 

cannula.  Systolic blood pressure in the 90s.  Blood cultures revealing no 

growth to date.  White count 0.7.  Hemoglobin 6.0.  Platelets 9000.  Sodium 137.

 Potassium 4.7.  Creatinine 1.22.





On 07/27/2020 patient seen in follow-up on a general medical oncology floor, he 

is awake and alert, in no acute distress, he is resting comfortably in bed, he 

is on 2 L of oxygen his pulse ox is 96%, afebrile, hemodynamically stable, 

although this morning his systolic was in the 90s and this afternoon his 

systolic is 87, and the patient is asymptomatic.  Today's lab work has been 

reviewed, and patient is still pancytopenic, with a white blood cell, 0.5, 

hemoglobin is 7.0, and platelet count is 9.  His renal profile is stable, would 

be on of 35, and creatinine is 1.21, sodium is 137, potassium is 4.2, chloride 

is 108.  His blood and sputum cultures have shown no growth so far.  Patient is 

on a combination of cefepime and vancomycin.  He is on a daily dose of oral 

Lasix.  His lung sounds are markedly diminished, no rhonchi or wheezing, no 

cough or congestion, his CT chest on 07/25/2020 was reviewed showing spiculated 

16 x 10 x 30 mm infiltrate in the right upper lobe, 1.5 cm noncalcified 

subpleural nodular density in the posterior aspect of right midlung and 2.3 cm 

cavitating spiculated infiltrate in the left lower lobe posterior basal segment.

 Clinically patient appears to be stable, no nausea vomiting no diarrhea, no 

altered mentation.  No worsening dyspnea.





On 07/28/2020 patient seen in follow-up on a general medical oncology floor.  he

is resting comfortably in bed, his only complaint today is that she is feeling a

bit dizzy, but she denies any shortness of breath, no cough or congestion.  Lung

sounds reveal markedly diminished breath sounds bilaterally, breathing appears 

to be comfortable, patient is on room air with pulse ox of 96%, did have a low-

grade fever early this morning with a temp of 100.4F.  Remains on antibiotic 

coverage including Zosyn, vancomycin has been discontinued.  Blood and sputum 

cultures have shown no growth.  Patient was started on filgrastim per oncology. 

Today's lab work has been reviewed, and patient remains pancytopenic with a 

white blood cell count of 0.5, hemoglobin is 6.7, electrolytes within normal 

limits, B1 is 30 creatinine is 1.25.











Objective





- Vital Signs


Vital signs: 


                                   Vital Signs











Temp  98.5 F   07/28/20 07:35


 


Pulse  74   07/28/20 08:56


 


Resp  17   07/28/20 07:35


 


BP  88/37   07/28/20 07:35


 


Pulse Ox  96   07/28/20 04:45








                                 Intake & Output











 07/27/20 07/28/20 07/28/20





 18:59 06:59 18:59


 


Intake Total 580 1048 


 


Output Total  550 


 


Balance 580 498 


 


Intake:   


 


  Intake, IV Titration 100 100 





  Amount   


 


    Cefepime 2 gm In Sodium 100  





    Chloride 0.9% 100 ml @   





    200 mls/hr IVPB Q8HR [facility]   





    Rx#:[id number]   


 


    Piperacillin-Tazobactam 3  100 





    .375 gm In Sodium   





    Chloride 0.9% 100 ml @ 25   





    mls/hr IVPB Q8HR [facility] Rx#   





    :[id number]   


 


  Oral 480 240 


 


  Blood Product  708 


 


    Platelet Irr Pheresis  354 





    Acda  Unit [id number]   


 


Output:   


 


  Urine  550 


 


Other:   


 


  Voiding Method  Urinal Urinal














- Exam


 GENERAL EXAM: Alert, very pleasant, 70-year-old white male, on room air with t

he pulse ox of 96% comfortable in no apparent distress.


HEAD: Normocephalic/atraumatic.


EYES: Normal reaction of pupils, equal size.  Conjunctiva pink, sclera white.


NOSE: Clear with pink turbinates.


THROAT: No erythema or exudates.


NECK: No masses, no JVD, no thyroid enlargement, no adenopathy.


CHEST: No chest wall deformity.  Symmetrical expansion. 


LUNGS: Equal air entry with markedly diminished breath sounds bilaterally


CVS: Regular rate and rhythm, normal S1 and S2, no gallops, no murmurs, no rubs


ABDOMEN: Soft, nontender.  No hepatosplenomegaly, normal bowel sounds, no 

guarding or rigidity.


EXTREMITIES: No clubbing, no edema, no cyanosis, 2+ pulses and upper and lower 

extremities.


MUSCULOSKELETAL: Muscle strength and tone normal.


SPINE: No scoliosis or deformity


SKIN: No rashes


CENTRAL NERVOUS SYSTEM: Alert and oriented -3.  No focal deficits, tone is 

normal in all 4 extremities.


PSYCHIATRIC: Alert and oriented -3.  Appropriate affect.  Intact judgment and 

insight.











- Labs


CBC & Chem 7: 


                                 07/28/20 08:29





                                 07/28/20 08:29


Labs: 


                  Abnormal Lab Results - Last 24 Hours (Table)











  07/27/20 07/27/20 07/28/20 Range/Units





  12:06 12:06 08:29 


 


WBC  0.5 L*   0.5 L*  (3.8-10.6)  k/uL


 


RBC  2.10 L   2.00 L  (4.30-5.90)  m/uL


 


Hgb  7.0 L   6.7 L*  (13.0-17.5)  gm/dL


 


Hct  21.3 L   20.4 L  (39.0-53.0)  %


 


MCV  101.7 H   102.2 H  (80.0-100.0)  fL


 


RDW  22.2 H   22.5 H  (11.5-15.5)  %


 


Plt Count  9 L*   23 L D  (150-450)  k/uL


 


Chloride   108 H   ()  mmol/L


 


BUN   35 H   (9-20)  mg/dL


 


Glucose   114 H   (74-99)  mg/dL


 


Calcium   7.8 L   (8.4-10.2)  mg/dL


 


C-Reactive Protein     (<10.0)  mg/L


 


Total Protein   5.6 L   (6.3-8.2)  g/dL


 


Albumin   2.4 L   (3.5-5.0)  g/dL














  07/28/20 Range/Units





  08:29 


 


WBC   (3.8-10.6)  k/uL


 


RBC   (4.30-5.90)  m/uL


 


Hgb   (13.0-17.5)  gm/dL


 


Hct   (39.0-53.0)  %


 


MCV   (80.0-100.0)  fL


 


RDW   (11.5-15.5)  %


 


Plt Count   (150-450)  k/uL


 


Chloride   ()  mmol/L


 


BUN  30 H  (9-20)  mg/dL


 


Glucose  105 H  (74-99)  mg/dL


 


Calcium  8.3 L  (8.4-10.2)  mg/dL


 


C-Reactive Protein  209.5 H  (<10.0)  mg/L


 


Total Protein  5.9 L  (6.3-8.2)  g/dL


 


Albumin  2.6 L  (3.5-5.0)  g/dL








                      Microbiology - Last 24 Hours (Table)











 07/24/20 20:34 Blood Culture - Preliminary





 Blood    No Growth after 72 hours


 


 07/25/20 17:56 Blood Culture - Preliminary





 Blood    No Growth after 48 hours


 


 07/26/20 11:45 Gram Stain - Final





 Sputum Sputum Culture - Final














Assessment and Plan


Plan: 


 Assessment:





1 Pancytopenia secondary to antineoplastic chemotherapy for myelodysplastic 

syndrome.  He had been treated with G CSF 2.  Received Neulasta.





2 Anemia secondary to above presenting hemoglobin 5.1.  Status post 3 units 

packed red blood cells with improvement of 7.3.  Today's hemoglobin 6.7.  





3 Thrombocytopenia secondary to above current platelet count 19,000.  Status 

post transfusion with 2 units platelet





4 Leukopenia secondary to above.





5 Multiple pulmonary infiltrates including a spiculated 16 x 10 x 30 mm 

infiltrate in the right upper lobe.  A 1.5 cm noncalcified subpleural nodular 

density in the posterior segment of the right upper lobe.  7 mm noncalcified 

nodule of the right lower lobe superior segment.  A 2.3 cm cavitating spiculated

infiltrate of the left lower lobe posterior basal segment.  There is extensive 

calcified pleural plaque of the lung bases.  No hilar mass.  Few mediastinal 

lymph nodes that measure 1 cm.





6 Chronic tobacco dependence of greater than 60 years





7 Daily alcohol use





Plan:





Continue current medical treatment, today's labs have been noted, patient 

remains pancytopenic, has been started on filgrastim.  No difficulty breathing 

reported, patient is on room air.  Antibiotics per ID service recommendations, 

sputum and blood cultures remain negative.  No plans for bronchoscopy with 

biopsies right now, patient is very thrombocytopenic, and anemic, and would 

present a risk for a biopsy at this time.  We'll continue optimizing his current

condition


I performed a history & physical examination of the patient and discussed their 

management with my nurse practitioner, Louisa Zamora.  I reviewed the nurse 

practitioner's note and agree with the documented findings and plan of care.  

Lung sounds are positive for diffuse wheezes throughout the lung fields.  The 

findings and the impression was discussed with the patient.  I attest to the 

documentation by the nurse practitioner. 





Time with Patient: Less than 30

## 2020-07-28 NOTE — P.PN
Subjective


Progress Note Date: 07/28/20


Principal diagnosis: 





Pancytopenia status post treatment of MDS





In follow-up today patient states being bored, he had a fever last night-nursing

does not feel it was a true fever as pt was cover up with multiple blankets and 

the room temp was hot.  He has diarrhea, sore under his denture, no other c/o 

today.  





Objective





- Vital Signs


Vital signs: 


                                   Vital Signs











Temp  98.4 F   07/28/20 13:29


 


Pulse  85   07/28/20 13:29


 


Resp  18   07/28/20 13:29


 


BP  99/56   07/28/20 13:29


 


Pulse Ox  92 L  07/28/20 13:29








                                 Intake & Output











 07/27/20 07/28/20 07/28/20





 18:59 06:59 18:59


 


Intake Total 580 1048 300


 


Output Total  550 300


 


Balance 580 498 0


 


Intake:   


 


  Intake, IV Titration 100 100 100





  Amount   


 


    Cefepime 2 gm In Sodium 100  





    Chloride 0.9% 100 ml @   





    200 mls/hr IVPB Q8HR [facility]   





    Rx#:[id number]   


 


    Piperacillin-Tazobactam 3  100 100





    .375 gm In Sodium   





    Chloride 0.9% 100 ml @ 25   





    mls/hr IVPB Q8HR [facility] Rx#   





    :[id number]   


 


  Oral 480 240 200


 


  Blood Product  708 


 


    Platelet Irr Pheresis  354 





    Acda  Unit [id number]   


 


Output:   


 


  Urine  550 300


 


Other:   


 


  Voiding Method  Urinal Toilet


 


  # Voids   2














- Constitutional


General appearance: Present: cooperative, no acute distress, thin





- EENT


Eyes: Present: anicteric sclerae, edentulous, poor dentition


ENT: Present: hearing grossly normal, pharyngeal erythema, thrush





- Respiratory


Respiratory: bilateral: CTA





- Cardiovascular


Heart sounds: normal: S1, S2


Abnormal Heart Sounds: Absent: systolic murmur, diastolic murmur, rub, S3 

Gallop, S4 Gallop, click, other





- Peripheral edema


  ** leg


Peripheral Edema: bilateral: None





- Gastrointestinal


General gastrointestinal: Present: normal bowel sounds, soft





- Neurologic


Neurologic: Present: CNII-XII intact





- Musculoskeletal


Musculoskeletal: Present: generalized weakness, strength equal bilaterally





- Psychiatric


Psychiatric: Present: A&O x's 3, appropriate affect, intact judgment & insight





- Labs


CBC & Chem 7: 


                                 07/28/20 08:29





                                 07/28/20 08:29


Labs: 


                  Abnormal Lab Results - Last 24 Hours (Table)











  07/28/20 07/28/20 Range/Units





  08:29 08:29 


 


WBC  0.5 L*   (3.8-10.6)  k/uL


 


RBC  2.00 L   (4.30-5.90)  m/uL


 


Hgb  6.7 L*   (13.0-17.5)  gm/dL


 


Hct  20.4 L   (39.0-53.0)  %


 


MCV  102.2 H   (80.0-100.0)  fL


 


RDW  22.5 H   (11.5-15.5)  %


 


Plt Count  23 L D   (150-450)  k/uL


 


ESR  116 H   (0-15)  mm/hr


 


BUN   30 H  (9-20)  mg/dL


 


Glucose   105 H  (74-99)  mg/dL


 


Calcium   8.3 L  (8.4-10.2)  mg/dL


 


C-Reactive Protein   209.5 H  (<10.0)  mg/L


 


Total Protein   5.9 L  (6.3-8.2)  g/dL


 


Albumin   2.6 L  (3.5-5.0)  g/dL








                      Microbiology - Last 24 Hours (Table)











 07/24/20 20:34 Blood Culture - Preliminary





 Blood    No Growth after 72 hours


 


 07/25/20 17:56 Blood Culture - Preliminary





 Blood    No Growth after 48 hours














Assessment and Plan


(1) Pancytopenia due to antineoplastic chemotherapy


Narrative/Plan: 


Transfuse for hemoglobin less than 7 or symptomatic.  Transfusion today, 

hemoglobin 6.7





Transfuse for platelets less than 10,000 unless symptomatic.  





Patient did receive G-CSF after treatment.  That was on the 18th.  Patient will 

receive short acting G-CSF starting today


Current Visit: Yes   Status: Chronic   Priority: Medium   Code(s): D61.810 - 

ANTINEOPLASTIC CHEMOTHERAPY INDUCED PANCYTOPENIA; T45.1X5A - ADVERSE EFFECT OF 

ANTINEOPLASTIC AND IMMUNOSUP DRUGS, INIT   SNOMED Code(s): 875863648161249


   





(2) MDS (myelodysplastic syndrome), high grade


Narrative/Plan: 


Patient is now status post 2 of the recommended 3 treatments suggested by 

Hematology to see if able to get some control of his disease.  I saw patient 

week after treatment last week and his counts actually were pretty decent and he

was feeling pretty good.  Unfortunately, he is now in [place].  We will see how 

well he is able to recuperate.  Further recommendations for treatment to follow.








Current Visit: No   Status: Chronic   Priority: Medium   Code(s): D46.Z - OTHER 

MYELODYSPLASTIC SYNDROMES   SNOMED Code(s): 604792484


   





(3) Thrush, oral


Narrative/Plan: 


Mild, coating the tongue, sore under denture.  Cont salt and baking soda for 

cleansing of the mouth.  Still recommend brushing the teeth at least twice a day

, obtain a new toothbrush, especially at home, try brushing the tongue if able. 

Place nystatin in tongue and hold on sore area on the roof of mouth


Current Visit: Yes   Status: Acute   Priority: Medium   Code(s): B37.0 - 

CANDIDAL STOMATITIS   SNOMED Code(s): 89743380


   


Plan: 





ID was going to review the computed tomography scan, pending sputum cultures, 

may consider bronchoscopy versus percutaneous biopsy to see what is going on in 

the patient's lung once WBC/ANC and platelets stable.  Pending recommendations.

## 2020-07-29 LAB
ALBUMIN SERPL-MCNC: 2.5 G/DL (ref 3.5–5)
ALP SERPL-CCNC: 55 U/L (ref 38–126)
ALT SERPL-CCNC: 39 U/L (ref 4–49)
ANION GAP SERPL CALC-SCNC: 3 MMOL/L
AST SERPL-CCNC: 43 U/L (ref 17–59)
BUN SERPL-SCNC: 30 MG/DL (ref 9–20)
CALCIUM SPEC-MCNC: 8 MG/DL (ref 8.4–10.2)
CHLORIDE SERPL-SCNC: 108 MMOL/L (ref 98–107)
CO2 SERPL-SCNC: 27 MMOL/L (ref 22–30)
ERYTHROCYTE [DISTWIDTH] IN BLOOD BY AUTOMATED COUNT: 2.04 M/UL (ref 4.3–5.9)
ERYTHROCYTE [DISTWIDTH] IN BLOOD: 22.3 % (ref 11.5–15.5)
GLUCOSE SERPL-MCNC: 93 MG/DL (ref 74–99)
HCT VFR BLD AUTO: 20.9 % (ref 39–53)
HGB BLD-MCNC: 6.6 GM/DL (ref 13–17.5)
MCH RBC QN AUTO: 32.2 PG (ref 25–35)
MCHC RBC AUTO-ENTMCNC: 31.3 G/DL (ref 31–37)
MCV RBC AUTO: 102.8 FL (ref 80–100)
PLATELET # BLD AUTO: 17 K/UL (ref 150–450)
POTASSIUM SERPL-SCNC: 3.9 MMOL/L (ref 3.5–5.1)
PROT SERPL-MCNC: 5.8 G/DL (ref 6.3–8.2)
SODIUM SERPL-SCNC: 138 MMOL/L (ref 137–145)
WBC # BLD AUTO: 0.6 K/UL (ref 3.8–10.6)

## 2020-07-29 RX ADMIN — NYSTATIN SCH UNIT: 100000 SUSPENSION ORAL at 16:52

## 2020-07-29 RX ADMIN — ACYCLOVIR SCH MG: 200 CAPSULE ORAL at 21:24

## 2020-07-29 RX ADMIN — ACYCLOVIR SCH MG: 200 CAPSULE ORAL at 16:52

## 2020-07-29 RX ADMIN — POTASSIUM CHLORIDE SCH MEQ: 20 TABLET, EXTENDED RELEASE ORAL at 08:31

## 2020-07-29 RX ADMIN — DICYCLOMINE HYDROCHLORIDE SCH MG: 20 TABLET ORAL at 11:59

## 2020-07-29 RX ADMIN — FILGRASTIM-SNDZ SCH MCG: 480 INJECTION, SOLUTION INTRAVENOUS; SUBCUTANEOUS at 08:32

## 2020-07-29 RX ADMIN — DICYCLOMINE HYDROCHLORIDE SCH MG: 20 TABLET ORAL at 08:31

## 2020-07-29 RX ADMIN — Medication SCH ML: at 05:26

## 2020-07-29 RX ADMIN — Medication SCH ML: at 16:53

## 2020-07-29 RX ADMIN — ISODIUM CHLORIDE SCH: 0.03 SOLUTION RESPIRATORY (INHALATION) at 19:06

## 2020-07-29 RX ADMIN — NYSTATIN SCH UNIT: 100000 SUSPENSION ORAL at 11:59

## 2020-07-29 RX ADMIN — Medication SCH ML: at 20:15

## 2020-07-29 RX ADMIN — PIPERACILLIN AND TAZOBACTAM SCH MLS/HR: 3; .375 INJECTION, POWDER, FOR SOLUTION INTRAVENOUS at 08:32

## 2020-07-29 RX ADMIN — ACYCLOVIR SCH MG: 200 CAPSULE ORAL at 08:31

## 2020-07-29 RX ADMIN — ISODIUM CHLORIDE SCH MG: 0.03 SOLUTION RESPIRATORY (INHALATION) at 06:54

## 2020-07-29 RX ADMIN — ACETAMINOPHEN PRN MG: 325 TABLET, FILM COATED ORAL at 21:25

## 2020-07-29 RX ADMIN — FUROSEMIDE SCH MG: 40 TABLET ORAL at 08:31

## 2020-07-29 RX ADMIN — ISODIUM CHLORIDE SCH MG: 0.03 SOLUTION RESPIRATORY (INHALATION) at 10:42

## 2020-07-29 RX ADMIN — DICYCLOMINE HYDROCHLORIDE SCH MG: 20 TABLET ORAL at 21:25

## 2020-07-29 RX ADMIN — NYSTATIN SCH UNIT: 100000 SUSPENSION ORAL at 21:25

## 2020-07-29 RX ADMIN — PIPERACILLIN AND TAZOBACTAM SCH MLS/HR: 3; .375 INJECTION, POWDER, FOR SOLUTION INTRAVENOUS at 02:09

## 2020-07-29 RX ADMIN — Medication SCH ML: at 12:00

## 2020-07-29 RX ADMIN — VORICONAZOLE SCH MLS/HR: 10 INJECTION, POWDER, LYOPHILIZED, FOR SOLUTION INTRAVENOUS at 22:31

## 2020-07-29 RX ADMIN — PANTOPRAZOLE SODIUM SCH MG: 40 TABLET, DELAYED RELEASE ORAL at 11:59

## 2020-07-29 RX ADMIN — ACETAMINOPHEN PRN MG: 325 TABLET, FILM COATED ORAL at 05:25

## 2020-07-29 RX ADMIN — NYSTATIN SCH UNIT: 100000 SUSPENSION ORAL at 08:32

## 2020-07-29 RX ADMIN — DICYCLOMINE HYDROCHLORIDE SCH MG: 20 TABLET ORAL at 16:52

## 2020-07-29 RX ADMIN — PIPERACILLIN AND TAZOBACTAM SCH MLS/HR: 3; .375 INJECTION, POWDER, FOR SOLUTION INTRAVENOUS at 16:52

## 2020-07-29 RX ADMIN — THERA TABS SCH EACH: TAB at 08:31

## 2020-07-29 NOTE — P.PN
Subjective


Progress Note Date: 07/29/20


Principal diagnosis: 





Pancytopenia status post treatment of MDS





In follow-up today patient has no acute physical complaints.  He had an episode 

of diarrhea this morning, C. diff was negative, he denies abdominal cramping, 

rectal pain or bleeding.  After discussion of treating the sore under his 

denture yesterday he states he did not do it so, the sore persists.  He 

continues to get weaker as he lays in bed.  





Objective





- Vital Signs


Vital signs: 


                                   Vital Signs











Temp  100.5 F H  07/29/20 07:14


 


Pulse  76   07/29/20 10:50


 


Resp  22   07/29/20 07:14


 


BP  90/56   07/29/20 07:14


 


Pulse Ox  97   07/29/20 07:14








                                 Intake & Output











 07/28/20 07/29/20 07/29/20





 18:59 06:59 18:59


 


Intake Total 300 750 


 


Output Total 300 300 


 


Balance 0 450 


 


Intake:   


 


  Intake, IV Titration 100  





  Amount   


 


    Piperacillin-Tazobactam 3 100  





    .375 gm In Sodium   





    Chloride 0.9% 100 ml @ 25   





    mls/hr IVPB Q8HR [facility] Rx#   





    :[id number]   


 


  Oral 200 440 


 


  Blood Product  310 


 


    Rc Irr As1  Unit  310 





    [id number]   


 


Output:   


 


  Urine 300 300 


 


Other:   


 


  Voiding Method Toilet Toilet Toilet





   Urinal


 


  # Voids 2 1 


 


  # Bowel Movements  1 














- Constitutional


General appearance: Present: cooperative, no acute distress, thin





- EENT


Eyes: Present: anicteric sclerae, EOMI


ENT: Present: hearing grossly normal, thrush





- Respiratory


Respiratory: bilateral: CTA





- Cardiovascular


Rhythm: regular


Heart sounds: normal: S1, S2


Abnormal Heart Sounds: Absent: systolic murmur, diastolic murmur, rub, S3 

Gallop, S4 Gallop, click, other





- Peripheral edema


  ** leg


Peripheral Edema: bilateral: None





- Gastrointestinal


General gastrointestinal: Present: normal bowel sounds, soft





- Neurologic


Neurologic: Present: CNII-XII intact





- Musculoskeletal


Musculoskeletal: Present: generalized weakness





- Psychiatric


Psychiatric: Present: A&O x's 3, appropriate affect, intact judgment & insight





- Labs


CBC & Chem 7: 


                                 07/29/20 06:57





                                 07/29/20 06:57


Labs: 


                  Abnormal Lab Results - Last 24 Hours (Table)











  07/28/20 07/28/20 07/29/20 Range/Units





  08:29 10:56 06:57 


 


WBC    0.6 L*  (3.8-10.6)  k/uL


 


RBC    2.04 L  (4.30-5.90)  m/uL


 


Hgb    6.6 L*  (13.0-17.5)  gm/dL


 


Hct    20.9 L  (39.0-53.0)  %


 


MCV    102.8 H  (80.0-100.0)  fL


 


RDW    22.3 H  (11.5-15.5)  %


 


Plt Count    17 L*  (150-450)  k/uL


 


ESR  116 H    (0-15)  mm/hr


 


Chloride     ()  mmol/L


 


BUN     (9-20)  mg/dL


 


Calcium     (8.4-10.2)  mg/dL


 


Total Protein     (6.3-8.2)  g/dL


 


Albumin     (3.5-5.0)  g/dL


 


Crossmatch   See Detail   














  07/29/20 Range/Units





  06:57 


 


WBC   (3.8-10.6)  k/uL


 


RBC   (4.30-5.90)  m/uL


 


Hgb   (13.0-17.5)  gm/dL


 


Hct   (39.0-53.0)  %


 


MCV   (80.0-100.0)  fL


 


RDW   (11.5-15.5)  %


 


Plt Count   (150-450)  k/uL


 


ESR   (0-15)  mm/hr


 


Chloride  108 H  ()  mmol/L


 


BUN  30 H  (9-20)  mg/dL


 


Calcium  8.0 L  (8.4-10.2)  mg/dL


 


Total Protein  5.8 L  (6.3-8.2)  g/dL


 


Albumin  2.5 L  (3.5-5.0)  g/dL


 


Crossmatch   








                      Microbiology - Last 24 Hours (Table)











 07/24/20 20:34 Blood Culture - Preliminary





 Blood    No Growth after 96 hours


 


 07/25/20 17:56 Blood Culture - Preliminary





 Blood    No Growth after 72 hours














Assessment and Plan


(1) Pancytopenia due to antineoplastic chemotherapy


Narrative/Plan: 


Transfuse for hemoglobin less than 7 or symptomatic.  Transfusion today, 

hemoglobin 6.6





Transfuse for platelets less than 10,000 unless symptomatic.  No transfusion 

today, platelets 17,000





Patient did receive G-CSF after treatment.  That was on the 18th.  Patient 

started short acting G-CSF 7/28


Current Visit: Yes   Status: Chronic   Priority: Medium   Code(s): D61.810 - 

ANTINEOPLASTIC CHEMOTHERAPY INDUCED PANCYTOPENIA; T45.1X5A - ADVERSE EFFECT OF 

ANTINEOPLASTIC AND IMMUNOSUP DRUGS, INIT   SNOMED Code(s): 652838827444200


   





(2) MDS (myelodysplastic syndrome), high grade


Narrative/Plan: 


Patient is now status post 2 of the recommended 3 treatments suggested by 

Hematology to see if able to get some control of his disease.  I saw patient 

week after treatment last week and his counts actually were pretty decent and he

was feeling pretty good.  Unfortunately, he is now in [place].  We will see how 

well he is able to recuperate.  Further recommendations for treatment to follow.








Current Visit: No   Status: Chronic   Priority: Medium   Code(s): D46.Z - OTHER 

MYELODYSPLASTIC SYNDROMES   SNOMED Code(s): 085004220


   





(3) Thrush, oral


Narrative/Plan: 


Mild, coating the tongue, sore under denture.  Cont salt and baking soda for 

cleansing of the mouth.  Still recommend brushing the teeth at least twice a 

day, obtain a new toothbrush, especially at home, try brushing the tongue if 

able.  Place nystatin in tongue and hold on sore area on the roof of mouth-

patient did not comply with the recommendations.  His complaint of the [place]orton 

denture.  Patient doubly is malfitting dentures due to weight loss.  Reinforced 

recommendations


Current Visit: Yes   Status: Acute   Priority: Medium   Code(s): B37.0 - 

CANDIDAL STOMATITIS   SNOMED Code(s): 27193516


   


Plan: 





ID was going to review the computed tomography scan, pending sputum cultures, 

may consider bronchoscopy versus percutaneous biopsy to see what is going on in 

the patient's lung once WBC/ANC and platelets stable.  Pending recommendations.

## 2020-07-29 NOTE — PN
PROGRESS NOTE



DATE OF SERVICE:

07/29/2020



REASON FOR FOLLOWUP:

Febrile neutropenia and pneumonia.



INTERVAL HISTORY:

The patient has been spiking a fever a little bit; it is mostly in the evening. In the

morning the patient denies having any chest pain.  He had some cough, not bringing up

any sputum.  No nausea, vomiting, abdominal pain or diarrhea.



PHYSICAL EXAMINATION:

Blood pressure 109/67 with a pulse of 80, temperature 101.8.  He is 94% on room air.

General description is an elderly male lying in bed in no distress respiratory.

RESPIRATORY SYSTEM: Unlabored breathing. Clear to auscultation anteriorly.

HEART: S1, S2.  Regular rate and rhythm.

ABDOMEN: Soft. No tenderness.



LABS:

Hemoglobin is 6.6, white count 0.6, BUN of 30, creatinine 1.25.



DIAGNOSTIC IMPRESSION AND PLAN:

Patient with febrile neutropenia with concern for pneumonia, cavitary; question of

fungal, as the patient required Gram-negative coverage.  We will add voriconazole while

waiting for the serology and monitor his clinical course closely.





MMODL / IJN: [id number] / Job#: [id number]

## 2020-07-29 NOTE — P.PN
Subjective


Progress Note Date: 07/29/20








70-year-old male one of Dr. Luu's patient with past medical history of 

anemia, gout, tobacco use, daily alcohol use and myelodysplasia who has been set

pancytopenic for the last 2 month was started on chemotherapy with hematology 

recently was hospitalized for extended period of time for severe pancytopenia 

and severe neutropenia did not recover.  Patient apparently went back on 

chemotherapy after he left the hospital last time.  Today found to have severe 

anemia with hemoglobin of 5.1 with severe neutropenia white blood cell 1.3 and 

thrombocytopenia with platelets 28,000 only.  Patient brought to the [facility] emergency department where was seen and evaluated has been 

very weak has not been able to ambulate and walk and has been having worsening 

shortness of breath with dyspnea with minimal exertion for the last few days.  

With a current hemoglobin his oncologist and instructed him to come to the 

hospital for admission for transfusion.





7/24: Patient is seen today on the [facility] floor.  He is status post 2 units of 

packed RBCs and repeat lab work reveals hemoglobin of 7.3, WBC 0.9, platelet 

count 18.  Electrolytes normal, BUN 32 and creatinine 0.89.  Calcium 7.9, total 

bilirubin 1.5, AST 42, ALT 40, alkaline phosphatase 66.  Temperature max 100.0, 

heart rate 76, blood pressure 115/66, pulse ox 96% on room air.  Consult in 

place for oncology.  COVID-19 is pending.





7/25: Hemoglobin currently is at 7.1, platelet count of 16, double basic count 

of 0.7 oncology thinks that he has myelodysplasia,patient wants to stay another 

day just in case he needs another blood products, as it's difficult for him to 

be transported back and forth with the wife who has been on a wheelchair, 

patient has chronic cough from smoking, denies any feverhowever T-max is 100.0 

within the past 24 hours, Covid testc negative,chest x-ray shows no acute 

pulmonary disease, has clearing of pulmonary edema and pleural fluid compared to

old exam,urinalyses WBC of 2,blood cultures would be sent secondary to the 

fever, consult Dr. Hernandez for neutropenic fever, unknown primary at this 

time,start patient on cefotaxime,  Levaquin, we will obtain high resolution CT 

of the chest, CT of the abdomen fromJuly 2, 2020 was reviewed , CT June 28, 2 cm

stellate mass right upper lobe, 4 x 2 cm hypodensity posterior spleen no 

hydronephrosis no pulmonary has seen the patient, consult with Dr. Tabitha peres





7/26: Patient is being transfused currently for hemoglobin of 6.0, along with pl

atelet transfusion, both are irritated, platelet is down to 9, currently on IV 

cefepime with Jovanni, Dr. Chaparro is following, CAT scan of the chest shows multiple 

pulmonary infiltrates including a spiculated lesion 16 x 10 x 13 mm right upper 

lobe, 1.5 cm noncalcified subpleural nodular density posterior segment right 

upper lobe, 7 mm noncalcified nodule right lower lobe, 2.3, dictation spiculated

left lower lobe, posterior basal segment, suspected inflammatory changes, Dr. Mckeon pulmonary has been consulted, with recommendations for follow-up CT in 3 

months, sputum culture sent today patient still has cough no hemoptysis, no 

conversational dyspnea, no active wheezing, T-max of 98.2, blood pressure is 

between 90-98 systolic pressures, nonlabored breathing pulse ox room air 98%





7/27: Patient has been seen by Dr. Mckeon with recommendations for 3 month 

follow-up for repeat CAT scan.  Patient has been continued on vancomycin, and 

cefepime by Dr. Hernandez.  We will discontinue the Levaquin that was added by 

oncology for prophylaxis.  Patient does have a temperature max of 102.6, heart 

rate 79, blood pressure 95/53, pulse ox 97% on room air.  Repeat blood work will

be ordered for today.  Patient has remained pancytopenic over the weekend.





7/28: Yesterday's blood work revealed a hemoglobin of 7, WBC 0.5, platelet count

9 and patient received 1 unit of platelets and oncology started Zarxio.  Repeat 

blood work for today reveals WBC of 0.5, hemoglobin 6.7, platelet count 23.  BUN

30 creatinine 1.25.  Blood sugar 105.  C-reactive protein is 209.5.  Patient 

denies any new complaints.  No chest pain or shortness of breath.  No cough.  

Temperature max is 102.8 at 7 PM, heart rate 72, blood pressure 88/37, pulse ox 

96% on room air. Pulmonary medicine does not plan for bronchoscopies at this 

time.  Dr. David has changed and biotics to Zosyn.





7/29: Patient is sleeping at the time of evaluation.  He wakes easily to verbal 

stimuli.  Temperature maximum 100.6, heart rate 80, blood pressure 102/52, pulse

ox 98% on room air.  Patient has had diarrhea and C. difficile toxin is 

negative.  Legionella is negative.  Lab work yesterday revealed hemoglobin of 

6.7, W BC 0.5 and platelets of 23.  He received a unit of packed RBCs last 

evening.  Repeat lab work this morning reveals WBC of 0.6, hemoglobin 6.6, 

platelet count 17.  Patient is continued on Zosyn.  He is on salt and soda 

mouthwash for oral lesions.  He has also been started on Zarxio.





Review of Systems


CONSTITUTIONAL: Well-developed no acute respiratory distress.  Denies chills.  

Documented fever.


EYES: No icterus sclerae, no conjunctivitis.


EARS, NOSE, MOUTH, THROAT, and FACE: No sore throat, lymphadenopathy, carotid 

bruits or deformity.


RESPIRATORY: Positive shortness of breath cough wheezes.


CARDIOVASCULAR: No CP, Palpitation, PND, Orthopnea, or angina.


GASTROINTESTINAL: Mild change in bowel habit with diarrhea and slight dyspepsia


GENITOURINARY: Negative for Hematuria or UTI, no kidney stones.


INTEGUMENT/BREAST: Negative for any muscular injury with mild osteoarthritis..


HEMATOLOGIC/LYMPHATIC: History of myelodysplasia severe anemia thrombocytopenia 

and neutropenia.


MUSCULOSKELTAL: Negative for Myalgia or arthralgia.


NEURLOGICAL: No LOC, Sz or syncope, blurred vision dizziness or abnormality..


BEHAVIORAL/PSYCH: Negative.


ENDOCRINE: Negative.





Physical Examination


General Appearance: Alert, cooperative, no distress, appears stated age.  

Patient in bed and appears to be in no acute distress.


Neck HEENT: Supple, no lymphadenopathy, no thyroid enlargement, no carotid 

bruits.


Lungs: Decreased breaths bilaterally.


Chest Wall: Decrease expansion with deep inspiration no tenderness and no 

deformity was found on exam, no costochondral pain.


Heart: Regular rate and rhythm, S1, S2 mild tachycardia with systolic murmur.


Back: Symmetric, no curvature, ROM normal, no CVA tenderness.


Abdomen: Soft positive bowel sound slight discomfort in the mid abdominal area 

with mild organomegaly with multiple bruises no rebound or rigidity.


Extremities: Extremities trace edema with multiple bruises positive mild 

ecchymosis as well.


Pulses: 2+ and symmetric.


Skin: Skin color, texture, tugor normal, no rashes or lesions.


Neurologic: Alert oriented x3 cranial nerves II through XII intact, no motor 

deficit, no abnormal balance or gait.








Assessment and Plan


1 acute symptomatic anemia.  Patient is status post transfusion of 4 units 

packed RBCs.  Continue Protonix. Zarxio.





2 severe myelodysplasia.  Oncology consult appreciated.  Continue Zosyn.





3 severe neutropenia.  Oncology consult, Zarxio started.  In the meanwhile 

patient will go back on acyclovir, Diflucan.  Antibiotics changed to Zosyn.





4.  Pancytopenia.  Patient is status post transfusion of 4 units packed RBCs and

2 unit of platelets.  Patient is followed by oncology.





5 COPD without exacerbation.  Continue albuterol nebulizer 3 times daily.  





6 chronic edema: More diastolic congestive heart failure and chronic continue 

furosemide at 40 mg daily.





7 recurrent pneumonia: Patient is asymptomatic at this point.  Continue inhaler 

and antibiotics.





8 GI prophylaxis: pantoprazole 40 mg daily.





9 DVT prophylaxis: Patient will stay on Venodyne boots and knee-high HAKAN hose.





10 COVID-19 infection not present.





CODE STATUS: Full code.





Discharge plan: [facility] care and palliative care.





Impression and plan of care have been directed as dictated by the signing 

physician.  Brittney Bennett nurse practitioner acting as scribe for signing 

physician.





Objective





- Vital Signs


Vital signs: 


                                   Vital Signs











Temp  100.6 F H  07/29/20 04:43


 


Pulse  80   07/29/20 07:06


 


Resp  20   07/29/20 04:43


 


BP  102/52   07/29/20 04:43


 


Pulse Ox  93 L  07/29/20 04:43








                                 Intake & Output











 07/28/20 07/29/20 07/29/20





 18:59 06:59 18:59


 


Intake Total 300 750 


 


Output Total 300 300 


 


Balance 0 450 


 


Intake:   


 


  Intake, IV Titration 100  





  Amount   


 


    Piperacillin-Tazobactam 3 100  





    .375 gm In Sodium   





    Chloride 0.9% 100 ml @ 25   





    mls/hr IVPB Q8HR [facility] Rx#   





    [id number]   


 


  Oral 200 440 


 


  Blood Product  310 


 


    Rc Irr As1  Unit  310 





    [id number]   


 


Output:   


 


  Urine 300 300 


 


Other:   


 


  Voiding Method Toilet Toilet 


 


  # Voids 2 1 


 


  # Bowel Movements  1 














- Labs


CBC & Chem 7: 


                                 07/29/20 06:57





                                 07/29/20 06:57


Labs: 


                  Abnormal Lab Results - Last 24 Hours (Table)











  07/28/20 07/28/20 07/28/20 Range/Units





  08:29 08:29 10:56 


 


WBC  0.5 L*    (3.8-10.6)  k/uL


 


RBC  2.00 L    (4.30-5.90)  m/uL


 


Hgb  6.7 L*    (13.0-17.5)  gm/dL


 


Hct  20.4 L    (39.0-53.0)  %


 


MCV  102.2 H    (80.0-100.0)  fL


 


RDW  22.5 H    (11.5-15.5)  %


 


Plt Count  23 L D    (150-450)  k/uL


 


ESR  116 H    (0-15)  mm/hr


 


BUN   30 H   (9-20)  mg/dL


 


Glucose   105 H   (74-99)  mg/dL


 


Calcium   8.3 L   (8.4-10.2)  mg/dL


 


C-Reactive Protein   209.5 H   (<10.0)  mg/L


 


Total Protein   5.9 L   (6.3-8.2)  g/dL


 


Albumin   2.6 L   (3.5-5.0)  g/dL


 


Crossmatch    See Detail  








                      Microbiology - Last 24 Hours (Table)











 07/24/20 20:34 Blood Culture - Preliminary





 Blood    No Growth after 96 hours


 


 07/25/20 17:56 Blood Culture - Preliminary





 Blood    No Growth after 72 hours

## 2020-07-29 NOTE — P.PN
Subjective


Progress Note Date: 07/29/20


Principal diagnosis: 


 Weakness, shortness of breath





This is a very pleasant 70-year-old gentleman who follows with Dr. Luu is 

his primary care provider.  He has a history of gout, chronic and ongoing 

tobacco dependence, and myelodysplastic syndrome, high-grade and has been seen 

by medical oncology.  He has received a second round of chemotherapy with G-CSF 

approximately a week ago.  He did receive Neulasta as well.  He presented to the

emergency room on 07/23/2020 after being told by his PCP his hemoglobin was 

quite low.  The patient had been having weakness and shortness of breath.  

Initial labs revealed a WBC of 1.3.  Hemoglobin 5.1.  Platelets 28,000.  He is 

status post 2 units of packed red blood cells.  A third unit of packed red blood

cells as ordered for today for hemoglobin is 6.0.  He will be receiving 

platelets for a platelet count of 9000.  Yesterday he had undergone a computed 

tomography scan of the chest that revealed multiple pulmonary abnormalities 

including a spiculated 16 x 10 x 30 mm infiltrate of the right upper lobe.  

There is a 1.5 cm noncalcified subpleural nodular density in the posterior 

aspect of the right midlung in the posterior segment right upper lobe.  There is

a 2.3 cm cavitating spiculated infiltrate of the left lower lobe posterior basal

segment.  Extensive calcified pleural plaque at the lung bases.  No pleural 

effusion.  No hilar masses.  Few mediastinal lymph nodes measuring 1 cm.  We are

consulted for the same.  The patient is seen today on the oncology unit.  He is 

currently resting comfortably in bed.  Awake and alert in no acute distress.  

Somewhat of a poor historian.  His shortness of breath is about the same.  No 

cough or congestion.  No fever chills or night sweats.  Temperature last evening

was 100.8.  Maintaining O2 saturations in the mid 90s on 3 L/m per nasal 

cannula.  Systolic blood pressure in the 90s.  Blood cultures revealing no 

growth to date.  White count 0.7.  Hemoglobin 6.0.  Platelets 9000.  Sodium 137.

 Potassium 4.7.  Creatinine 1.22.





On 07/27/2020 patient seen in follow-up on a general medical oncology floor, he 

is awake and alert, in no acute distress, he is resting comfortably in bed, he 

is on 2 L of oxygen his pulse ox is 96%, afebrile, hemodynamically stable, 

although this morning his systolic was in the 90s and this afternoon his 

systolic is 87, and the patient is asymptomatic.  Today's lab work has been 

reviewed, and patient is still pancytopenic, with a white blood cell, 0.5, 

hemoglobin is 7.0, and platelet count is 9.  His renal profile is stable, would 

be on of 35, and creatinine is 1.21, sodium is 137, potassium is 4.2, chloride 

is 108.  His blood and sputum cultures have shown no growth so far.  Patient is 

on a combination of cefepime and vancomycin.  He is on a daily dose of oral 

Lasix.  His lung sounds are markedly diminished, no rhonchi or wheezing, no 

cough or congestion, his CT chest on 07/25/2020 was reviewed showing spiculated 

16 x 10 x 30 mm infiltrate in the right upper lobe, 1.5 cm noncalcified 

subpleural nodular density in the posterior aspect of right midlung and 2.3 cm 

cavitating spiculated infiltrate in the left lower lobe posterior basal segment.

 Clinically patient appears to be stable, no nausea vomiting no diarrhea, no 

altered mentation.  No worsening dyspnea.





On 07/28/2020 patient seen in follow-up on a general medical oncology floor.  he

is resting comfortably in bed, his only complaint today is that she is feeling a

bit dizzy, but she denies any shortness of breath, no cough or congestion.  Lung

sounds reveal markedly diminished breath sounds bilaterally, breathing appears 

to be comfortable, patient is on room air with pulse ox of 96%, did have a low-

grade fever early this morning with a temp of 100.4F.  Remains on antibiotic 

coverage including Zosyn, vancomycin has been discontinued.  Blood and sputum 

cultures have shown no growth.  Patient was started on filgrastim per oncology. 

Today's lab work has been reviewed, and patient remains pancytopenic with a 

white blood cell count of 0.5, hemoglobin is 6.7, electrolytes within normal 

limits, B1 is 30 creatinine is 1.25.





On 07/29/2020 patient seen in follow-up on a general medical oncology floor, he 

is calm and comfortable, he is resting in bed, appears to be in no acute 

distress, denies any difficulty breathing, room air pulse ox is %, he 

continues to have low-grade fevers, with the T-max in last 24 hours of 100.6F, 

he continues to be pancytopenic, today's labs have been reviewed in his white 

blood cell count is 0.6, hemoglobin is 6.6, platelet count is 17, and patient 

will receive another unit of packed red blood cells today, so far he is been 

transfused with a total of 5 units of blood, and 2 units of platelets.  No 

obvious evidence of bleeding.  Lung sounds are clear, diminished, no rhonchi or 

wheezing, no complaints of chest pain.  Abdomen is soft, nontender.  Patient is 

however it very weak.  He is on Zosyn for antibiotic coverage, vancomycin and 

cefepime have been discontinued, ID service is following, so far blood and sputu

m are negative.  Stool cultures pending, C. diff was negative.











Objective





- Vital Signs


Vital signs: 


                                   Vital Signs











Temp  98.7 F   07/29/20 13:45


 


Pulse  74   07/29/20 13:45


 


Resp  18   07/29/20 13:45


 


BP  90/61   07/29/20 13:45


 


Pulse Ox  100   07/29/20 13:41








                                 Intake & Output











 07/28/20 07/29/20 07/29/20





 18:59 06:59 18:59


 


Intake Total 300 750 400


 


Output Total 300 300 200


 


Balance 0 450 200


 


Intake:   


 


  Intake, IV Titration 100  100





  Amount   


 


    Piperacillin-Tazobactam 3 100  100





    .375 gm In Sodium   





    Chloride 0.9% 100 ml @ 25   





    mls/hr IVPB Q8HR [facility] Rx#   





    [id number]   


 


  Oral 200 440 300


 


  Blood Product  310 0


 


    Rc Irr As1  Unit  310 





    [id number]   


 


    Rc Irr As1  Unit   0





    [id number]   


 


Output:   


 


  Urine 300 300 200


 


Other:   


 


  Voiding Method Toilet Toilet Toilet





   Urinal


 


  # Voids 2 1 


 


  # Bowel Movements  1 1














- Exam


 GENERAL EXAM: Alert, very pleasant, 70-year-old white male, very thin, almost 

cachectic, tanned, resting in bed on room air with the pulse ox of 96% 

comfortable in no apparent distress.


HEAD: Normocephalic/atraumatic.


EYES: Normal reaction of pupils, equal size.  Conjunctiva pink, sclera white.


NOSE: Clear with pink turbinates.


THROAT: No erythema or exudates.


NECK: No masses, no JVD, no thyroid enlargement, no adenopathy.


CHEST: No chest wall deformity.  Symmetrical expansion. 


LUNGS: Equal air entry with markedly diminished breath sounds bilaterally


CVS: Regular rate and rhythm, normal S1 and S2, no gallops, no murmurs, no rubs


ABDOMEN: Soft, nontender.  No hepatosplenomegaly, normal bowel sounds, no 

guarding or rigidity.


EXTREMITIES: No clubbing, no edema, no cyanosis, 2+ pulses and upper and lower 

extremities.


MUSCULOSKELETAL: Muscle strength and tone normal.


SPINE: No scoliosis or deformity


SKIN: No rashes


CENTRAL NERVOUS SYSTEM: Alert and oriented -3.  No focal deficits, tone is 

normal in all 4 extremities.


PSYCHIATRIC: Alert and oriented -3.  Appropriate affect.  Intact judgment and 

insight.











- Labs


CBC & Chem 7: 


                                 07/29/20 06:57





                                 07/29/20 06:57


Labs: 


                  Abnormal Lab Results - Last 24 Hours (Table)











  07/28/20 07/29/20 07/29/20 Range/Units





  10:56 06:57 06:57 


 


WBC   0.6 L*   (3.8-10.6)  k/uL


 


RBC   2.04 L   (4.30-5.90)  m/uL


 


Hgb   6.6 L*   (13.0-17.5)  gm/dL


 


Hct   20.9 L   (39.0-53.0)  %


 


MCV   102.8 H   (80.0-100.0)  fL


 


RDW   22.3 H   (11.5-15.5)  %


 


Plt Count   17 L*   (150-450)  k/uL


 


Chloride    108 H  ()  mmol/L


 


BUN    30 H  (9-20)  mg/dL


 


Calcium    8.0 L  (8.4-10.2)  mg/dL


 


Total Protein    5.8 L  (6.3-8.2)  g/dL


 


Albumin    2.5 L  (3.5-5.0)  g/dL


 


Crossmatch  See Detail    








                      Microbiology - Last 24 Hours (Table)











 07/29/20 00:40 Stool Culture - Preliminary





 Stool 


 


 07/24/20 20:34 Blood Culture - Preliminary





 Blood    No Growth after 96 hours


 


 07/25/20 17:56 Blood Culture - Preliminary





 Blood    No Growth after 72 hours














Assessment and Plan


Plan: 


 Assessment:





1 Pancytopenia secondary to antineoplastic chemotherapy for myelodysplastic 

syndrome.  He had been treated with G CSF 2.  Received Neulasta.





2 Anemia secondary to above presenting hemoglobin 5.1.  Status post 4 units 

packed red blood cells. Today's hemoglobin 6.6 and patient is receiving his 

fifth unit of PRBCs  





3 Thrombocytopenia secondary to above current platelet count 19,000.  Status 

post transfusion with 2 units platelet





4 Leukopenia secondary to above.





5 Multiple pulmonary infiltrates including a spiculated 16 x 10 x 30 mm 

infiltrate in the right upper lobe.  A 1.5 cm noncalcified subpleural nodular 

density in the posterior segment of the right upper lobe.  7 mm noncalcified 

nodule of the right lower lobe superior segment.  A 2.3 cm cavitating spiculated

infiltrate of the left lower lobe posterior basal segment.  There is extensive 

calcified pleural plaque of the lung bases.  No hilar mass.  Few mediastinal 

lymph nodes that measure 1 cm.





6 Chronic tobacco dependence of greater than 60 years





7 Daily alcohol use





Plan:





Continue current medical treatment, patient remains quite pancytopenic, and 

requiring frequent packed red blood cell transfusions, continues to have interm

ittent low-grade fevers, denies any shortness of breath denies any chest pain.  

Antibiotics per ID service recommendations.  Far his culture data remains 

negative.  Overall his generally weak.  No plans for bronchoscopy with biopsies 

right now, once the patient, once the antibiotics and get stronger we'll do a 

repeat CT of the chest on the outpatient basis.


I performed a history & physical examination of the patient and discussed their 

management with my nurse practitioner, Louisa Zamora.  I reviewed the nurse 

practitioner's note and agree with the documented findings and plan of care.  

Lung sounds are positive for diffuse wheezes throughout the lung fields.  The 

findings and the impression was discussed with the patient.  I attest to the 

documentation by the nurse practitioner. 





Time with Patient: Less than 30

## 2020-07-30 LAB
ERYTHROCYTE [DISTWIDTH] IN BLOOD BY AUTOMATED COUNT: 2.33 M/UL (ref 4.3–5.9)
ERYTHROCYTE [DISTWIDTH] IN BLOOD: 21.5 % (ref 11.5–15.5)
GLUCOSE BLD-MCNC: 102 MG/DL (ref 75–99)
HCT VFR BLD AUTO: 23.2 % (ref 39–53)
HGB BLD-MCNC: 7.7 GM/DL (ref 13–17.5)
MCH RBC QN AUTO: 33 PG (ref 25–35)
MCHC RBC AUTO-ENTMCNC: 33.1 G/DL (ref 31–37)
MCV RBC AUTO: 99.7 FL (ref 80–100)
PLATELET # BLD AUTO: 12 K/UL (ref 150–450)
WBC # BLD AUTO: 0.7 K/UL (ref 3.8–10.6)

## 2020-07-30 RX ADMIN — NYSTATIN SCH UNIT: 100000 SUSPENSION ORAL at 21:29

## 2020-07-30 RX ADMIN — Medication SCH ML: at 00:42

## 2020-07-30 RX ADMIN — DICYCLOMINE HYDROCHLORIDE SCH MG: 20 TABLET ORAL at 21:29

## 2020-07-30 RX ADMIN — NYSTATIN SCH UNIT: 100000 SUSPENSION ORAL at 18:00

## 2020-07-30 RX ADMIN — VORICONAZOLE SCH MLS/HR: 10 INJECTION, POWDER, LYOPHILIZED, FOR SOLUTION INTRAVENOUS at 09:55

## 2020-07-30 RX ADMIN — PIPERACILLIN AND TAZOBACTAM SCH MLS/HR: 3; .375 INJECTION, POWDER, FOR SOLUTION INTRAVENOUS at 16:18

## 2020-07-30 RX ADMIN — ACYCLOVIR SCH MG: 200 CAPSULE ORAL at 16:19

## 2020-07-30 RX ADMIN — ACETAMINOPHEN PRN MG: 325 TABLET, FILM COATED ORAL at 05:17

## 2020-07-30 RX ADMIN — PIPERACILLIN AND TAZOBACTAM SCH MLS/HR: 3; .375 INJECTION, POWDER, FOR SOLUTION INTRAVENOUS at 00:42

## 2020-07-30 RX ADMIN — ISODIUM CHLORIDE SCH MG: 0.03 SOLUTION RESPIRATORY (INHALATION) at 07:21

## 2020-07-30 RX ADMIN — NYSTATIN SCH UNIT: 100000 SUSPENSION ORAL at 09:20

## 2020-07-30 RX ADMIN — DICYCLOMINE HYDROCHLORIDE SCH MG: 20 TABLET ORAL at 18:00

## 2020-07-30 RX ADMIN — ACETAMINOPHEN PRN MG: 325 TABLET, FILM COATED ORAL at 21:29

## 2020-07-30 RX ADMIN — THERA TABS SCH EACH: TAB at 09:21

## 2020-07-30 RX ADMIN — NYSTATIN SCH UNIT: 100000 SUSPENSION ORAL at 13:30

## 2020-07-30 RX ADMIN — PANTOPRAZOLE SODIUM SCH MG: 40 TABLET, DELAYED RELEASE ORAL at 09:28

## 2020-07-30 RX ADMIN — ACETAMINOPHEN PRN MG: 325 TABLET, FILM COATED ORAL at 12:22

## 2020-07-30 RX ADMIN — ACYCLOVIR SCH MG: 200 CAPSULE ORAL at 09:21

## 2020-07-30 RX ADMIN — Medication SCH ML: at 16:18

## 2020-07-30 RX ADMIN — FILGRASTIM-SNDZ SCH MCG: 480 INJECTION, SOLUTION INTRAVENOUS; SUBCUTANEOUS at 09:19

## 2020-07-30 RX ADMIN — Medication SCH ML: at 11:20

## 2020-07-30 RX ADMIN — Medication SCH ML: at 05:59

## 2020-07-30 RX ADMIN — ISODIUM CHLORIDE SCH MG: 0.03 SOLUTION RESPIRATORY (INHALATION) at 11:10

## 2020-07-30 RX ADMIN — ISODIUM CHLORIDE SCH: 0.03 SOLUTION RESPIRATORY (INHALATION) at 19:16

## 2020-07-30 RX ADMIN — PIPERACILLIN AND TAZOBACTAM SCH MLS/HR: 3; .375 INJECTION, POWDER, FOR SOLUTION INTRAVENOUS at 09:14

## 2020-07-30 RX ADMIN — ACYCLOVIR SCH MG: 200 CAPSULE ORAL at 21:23

## 2020-07-30 RX ADMIN — FUROSEMIDE SCH MG: 40 TABLET ORAL at 09:21

## 2020-07-30 RX ADMIN — DICYCLOMINE HYDROCHLORIDE SCH MG: 20 TABLET ORAL at 09:22

## 2020-07-30 RX ADMIN — POTASSIUM CHLORIDE SCH MEQ: 20 TABLET, EXTENDED RELEASE ORAL at 09:22

## 2020-07-30 RX ADMIN — DICYCLOMINE HYDROCHLORIDE SCH MG: 20 TABLET ORAL at 13:30

## 2020-07-30 NOTE — P.PN
Subjective


Progress Note Date: 07/30/20


Principal diagnosis: 


 Weakness, shortness of breath





This is a very pleasant 70-year-old gentleman who follows with Dr. Luu is 

his primary care provider.  He has a history of gout, chronic and ongoing 

tobacco dependence, and myelodysplastic syndrome, high-grade and has been seen 

by medical oncology.  He has received a second round of chemotherapy with G-CSF 

approximately a week ago.  He did receive Neulasta as well.  He presented to the

emergency room on 07/23/2020 after being told by his PCP his hemoglobin was 

quite low.  The patient had been having weakness and shortness of breath.  

Initial labs revealed a WBC of 1.3.  Hemoglobin 5.1.  Platelets 28,000.  He is 

status post 2 units of packed red blood cells.  A third unit of packed red blood

cells as ordered for today for hemoglobin is 6.0.  He will be receiving 

platelets for a platelet count of 9000.  Yesterday he had undergone a computed 

tomography scan of the chest that revealed multiple pulmonary abnormalities 

including a spiculated 16 x 10 x 30 mm infiltrate of the right upper lobe.  

There is a 1.5 cm noncalcified subpleural nodular density in the posterior 

aspect of the right midlung in the posterior segment right upper lobe.  There is

a 2.3 cm cavitating spiculated infiltrate of the left lower lobe posterior basal

segment.  Extensive calcified pleural plaque at the lung bases.  No pleural 

effusion.  No hilar masses.  Few mediastinal lymph nodes measuring 1 cm.  We are

consulted for the same.  The patient is seen today on the oncology unit.  He is 

currently resting comfortably in bed.  Awake and alert in no acute distress.  

Somewhat of a poor historian.  His shortness of breath is about the same.  No 

cough or congestion.  No fever chills or night sweats.  Temperature last evening

was 100.8.  Maintaining O2 saturations in the mid 90s on 3 L/m per nasal 

cannula.  Systolic blood pressure in the 90s.  Blood cultures revealing no 

growth to date.  White count 0.7.  Hemoglobin 6.0.  Platelets 9000.  Sodium 137.

 Potassium 4.7.  Creatinine 1.22.





On 07/27/2020 patient seen in follow-up on a general medical oncology floor, he 

is awake and alert, in no acute distress, he is resting comfortably in bed, he 

is on 2 L of oxygen his pulse ox is 96%, afebrile, hemodynamically stable, 

although this morning his systolic was in the 90s and this afternoon his 

systolic is 87, and the patient is asymptomatic.  Today's lab work has been 

reviewed, and patient is still pancytopenic, with a white blood cell, 0.5, 

hemoglobin is 7.0, and platelet count is 9.  His renal profile is stable, would 

be on of 35, and creatinine is 1.21, sodium is 137, potassium is 4.2, chloride 

is 108.  His blood and sputum cultures have shown no growth so far.  Patient is 

on a combination of cefepime and vancomycin.  He is on a daily dose of oral 

Lasix.  His lung sounds are markedly diminished, no rhonchi or wheezing, no 

cough or congestion, his CT chest on 07/25/2020 was reviewed showing spiculated 

16 x 10 x 30 mm infiltrate in the right upper lobe, 1.5 cm noncalcified 

subpleural nodular density in the posterior aspect of right midlung and 2.3 cm 

cavitating spiculated infiltrate in the left lower lobe posterior basal segment.

 Clinically patient appears to be stable, no nausea vomiting no diarrhea, no 

altered mentation.  No worsening dyspnea.





On 07/28/2020 patient seen in follow-up on a general medical oncology floor.  he

is resting comfortably in bed, his only complaint today is that she is feeling a

bit dizzy, but she denies any shortness of breath, no cough or congestion.  Lung

sounds reveal markedly diminished breath sounds bilaterally, breathing appears 

to be comfortable, patient is on room air with pulse ox of 96%, did have a low-

grade fever early this morning with a temp of 100.4F.  Remains on antibiotic 

coverage including Zosyn, vancomycin has been discontinued.  Blood and sputum 

cultures have shown no growth.  Patient was started on filgrastim per oncology. 

Today's lab work has been reviewed, and patient remains pancytopenic with a 

white blood cell count of 0.5, hemoglobin is 6.7, electrolytes within normal 

limits, B1 is 30 creatinine is 1.25.





On 07/29/2020 patient seen in follow-up on a general medical oncology floor, he 

is calm and comfortable, he is resting in bed, appears to be in no acute 

distress, denies any difficulty breathing, room air pulse ox is %, he 

continues to have low-grade fevers, with the T-max in last 24 hours of 100.6F, 

he continues to be pancytopenic, today's labs have been reviewed in his white 

blood cell count is 0.6, hemoglobin is 6.6, platelet count is 17, and patient 

will receive another unit of packed red blood cells today, so far he is been 

transfused with a total of 5 units of blood, and 2 units of platelets.  No 

obvious evidence of bleeding.  Lung sounds are clear, diminished, no rhonchi or 

wheezing, no complaints of chest pain.  Abdomen is soft, nontender.  Patient is 

however it very weak.  He is on Zosyn for antibiotic coverage, vancomycin and 

cefepime have been discontinued, ID service is following, so far blood and sputu

m are negative.  Stool cultures pending, C. diff was negative.





On 07/30/2020 patient seen in follow-up on the general medical oncology floor, 

he is awake and alert, appears to be in no acute distress, he is resting in bed,

denies any specific complaints other than weakness and poor appetite, no dif

ficulty breathing, no cough or congestion, no compulsive chest pain, patient is 

on room air pulse ox of 95%, continues to have intermittent fevers, with a T-max

of 102.6F.  He has been requiring frequent transfusions and yesterday he 

received another unit of packed red blood cells for hemoglobin of 6.6, there is 

no obvious signs of bleeding, today's hemoglobin is 7.7, today's blood count is 

12,000, white count is 0.7, patient remains on Zarxio.  Antibiotic coverage is 

with Zosyn.  So far blood sputum cultures are negative, stool culture is 

pending.











Objective





- Vital Signs


Vital signs: 


                                   Vital Signs











Temp  97.9 F   07/30/20 06:18


 


Pulse  76   07/30/20 07:30


 


Resp  16   07/30/20 05:00


 


BP  100/59   07/30/20 05:00


 


Pulse Ox  95   07/30/20 05:00








                                 Intake & Output











 07/29/20 07/30/20 07/30/20





 18:59 06:59 18:59


 


Intake Total 710  


 


Output Total 200 350 


 


Balance 510 -350 


 


Weight 55.792 kg  


 


Intake:   


 


  Intake, IV Titration 100  





  Amount   


 


    Piperacillin-Tazobactam 3 100  





    .375 gm In Sodium   





    Chloride 0.9% 100 ml @ 25   





    mls/hr IVPB Q8HR [facility] Rx#   





    [id number]   


 


  Oral 300  


 


  Blood Product 310  


 


    Rc Irr As1  Unit 310  





    [id number]   


 


Output:   


 


  Urine 200 350 


 


Other:   


 


  Voiding Method Toilet Toilet Toilet





 Urinal Urinal Urinal


 


  # Bowel Movements 1  














- Exam


 GENERAL EXAM: Alert, very pleasant, 70-year-old white male, very thin, almost 

cachectic, tanned, resting in bed on room air with the pulse ox of 96% c

omfortable in no apparent distress.


HEAD: Normocephalic/atraumatic.


EYES: Normal reaction of pupils, equal size.  Conjunctiva pink, sclera white.


NOSE: Clear with pink turbinates.


THROAT: No erythema or exudates.


NECK: No masses, no JVD, no thyroid enlargement, no adenopathy.


CHEST: No chest wall deformity.  Symmetrical expansion. 


LUNGS: Equal air entry with markedly diminished breath sounds bilaterally


CVS: Regular rate and rhythm, normal S1 and S2, no gallops, no murmurs, no rubs


ABDOMEN: Soft, nontender.  No hepatosplenomegaly, normal bowel sounds, no 

guarding or rigidity.


EXTREMITIES: No clubbing, no edema, no cyanosis, 2+ pulses and upper and lower 

extremities.


MUSCULOSKELETAL: Muscle strength and tone normal.


SPINE: No scoliosis or deformity


SKIN: No rashes


CENTRAL NERVOUS SYSTEM: Alert and oriented -3.  No focal deficits, tone is 

normal in all 4 extremities.


PSYCHIATRIC: Alert and oriented -3.  Appropriate affect.  Intact judgment and 

insight.











- Labs


CBC & Chem 7: 


                                 07/30/20 06:23





                                 07/29/20 06:57


Labs: 


                  Abnormal Lab Results - Last 24 Hours (Table)











  07/28/20 07/30/20 Range/Units





  10:56 06:23 


 


WBC   0.7 L*  (3.8-10.6)  k/uL


 


RBC   2.33 L  (4.30-5.90)  m/uL


 


Hgb   7.7 L  (13.0-17.5)  gm/dL


 


Hct   23.2 L  (39.0-53.0)  %


 


RDW   21.5 H  (11.5-15.5)  %


 


Plt Count   12 L*  (150-450)  k/uL


 


Crossmatch  See Detail   








                      Microbiology - Last 24 Hours (Table)











 07/24/20 20:34 Blood Culture - Preliminary





 Blood    No Growth after 120 hours


 


 07/25/20 17:56 Blood Culture - Preliminary





 Blood    No Growth after 96 hours


 


 07/29/20 00:40 Stool Culture - Preliminary





 Stool 














Assessment and Plan


Plan: 


 Assessment:





1 Pancytopenia secondary to antineoplastic chemotherapy for myelodysplastic 

syndrome.  He had been treated with G CSF 2.  Received Neulasta.  Currently on 

Zarxio





2 Anemia secondary to above presenting hemoglobin 5.1.  Status post 5 units of 

PRBCs, today's hemoglobin is 7.7 on 07/30/2020





3 Thrombocytopenia secondary to above current platelet count 19,000.  Status 

post transfusion with 2 units platelet





4 Leukopenia secondary to above.





5 Multiple pulmonary infiltrates including a spiculated 16 x 10 x 30 mm 

infiltrate in the right upper lobe.  A 1.5 cm noncalcified subpleural nodular 

density in the posterior segment of the right upper lobe.  7 mm noncalcified 

nodule of the right lower lobe superior segment.  A 2.3 cm cavitating spiculated

infiltrate of the left lower lobe posterior basal segment.  There is extensive 

calcified pleural plaque of the lung bases.  No hilar mass.  Few mediastinal lym

ph nodes that measure 1 cm.





6 Chronic tobacco dependence of greater than 60 years





7 Daily alcohol use





Plan:





Patient denies any pulmonary complaints at this time, he is on room air, no 

shortness of breath, cough or congestion, he remains pancytopenic requiring 

frequent packed red blood cells infusion.  Oncology is following.  Pulmonary 

service will sign off and follow on is needed basis. 


I performed a history & physical examination of the patient and discussed their 

management with my nurse practitioner, Louisa Zamora.  I reviewed the nurse 

practitioner's note and agree with the documented findings and plan of care.  

Lung sounds are positive for diffuse wheezes throughout the lung fields.  The 

findings and the impression was discussed with the patient.  I attest to the 

documentation by the nurse practitioner. 





Time with Patient: Less than 30

## 2020-07-30 NOTE — P.PN
Subjective


Progress Note Date: 07/30/20


Principal diagnosis: 





Pancytopenia status post treatment of MDS





In follow-up today patient states a fever last night but denies riders, oral 

irritation is stable, he was brought over to a cleared liquid diet and 

tolerating well, only 1 bowel movement this a.m., still loose, he denies a 

significant cough, no purulent sputum, mild to moderate weakness.





Objective





- Vital Signs


Vital signs: 


                                   Vital Signs











Temp  97.9 F   07/30/20 06:18


 


Pulse  76   07/30/20 07:30


 


Resp  16   07/30/20 05:00


 


BP  100/59   07/30/20 05:00


 


Pulse Ox  95   07/30/20 05:00








                                 Intake & Output











 07/29/20 07/30/20 07/30/20





 18:59 06:59 18:59


 


Intake Total 710  


 


Output Total 200 350 


 


Balance 510 -350 


 


Weight 55.792 kg  


 


Intake:   


 


  Intake, IV Titration 100  





  Amount   


 


    Piperacillin-Tazobactam 3 100  





    .375 gm In Sodium   





    Chloride 0.9% 100 ml @ 25   





    mls/hr IVPB Q8HR [facility] Rx#   





    [id number]   


 


  Oral 300  


 


  Blood Product 310  


 


    Rc Irr As1  Unit 310  





    [id number]   


 


Output:   


 


  Urine 200 350 


 


Other:   


 


  Voiding Method Toilet Toilet Toilet





 Urinal Urinal Urinal


 


  # Bowel Movements 1  














- Constitutional


General appearance: Present: cooperative, no acute distress, thin





- EENT


Eyes: Present: anicteric sclerae, EOMI


ENT: Present: hearing grossly normal





- Respiratory


Details: 





Diminished left lower lobe, right upper lobe anteriorly otherwise clear to 

auscultation, respiratory effort is unlabored, chest expansion is symmetrical





- Cardiovascular


Rhythm: regular


Heart sounds: normal: S1, S2


Abnormal Heart Sounds: Absent: systolic murmur, diastolic murmur, rub, S3 

Gallop, S4 Gallop, click, other





- Peripheral edema


  ** leg


Peripheral Edema: bilateral: None





- Gastrointestinal


General gastrointestinal: Present: normal bowel sounds, scaphoid, soft





- Neurologic


Neurologic: Present: CNII-XII intact





- Musculoskeletal


Musculoskeletal: Present: generalized weakness, strength equal bilaterally





- Psychiatric


Psychiatric: Present: A&O x's 3, appropriate affect, intact judgment & insight





- Labs


CBC & Chem 7: 


                                 07/30/20 06:23





                                 07/29/20 06:57


Labs: 


                  Abnormal Lab Results - Last 24 Hours (Table)











  07/28/20 07/30/20 Range/Units





  10:56 06:23 


 


WBC   0.7 L*  (3.8-10.6)  k/uL


 


RBC   2.33 L  (4.30-5.90)  m/uL


 


Hgb   7.7 L  (13.0-17.5)  gm/dL


 


Hct   23.2 L  (39.0-53.0)  %


 


RDW   21.5 H  (11.5-15.5)  %


 


Plt Count   12 L*  (150-450)  k/uL


 


Crossmatch  See Detail   








                      Microbiology - Last 24 Hours (Table)











 07/24/20 20:34 Blood Culture - Preliminary





 Blood    No Growth after 120 hours


 


 07/25/20 17:56 Blood Culture - Preliminary





 Blood    No Growth after 96 hours


 


 07/29/20 00:40 Stool Culture - Preliminary





 Stool 














Assessment and Plan


(1) Febrile neutropenia


Narrative/Plan: 


Patient has had intermittent fevers.  ID and Pulmonary following.  Medications 

have been adjusted per their recommendations.


Current Visit: Yes   Status: Acute   Priority: High   Code(s): D70.9 - 

NEUTROPENIA, UNSPECIFIED; R50.81 - FEVER PRESENTING WITH CONDITIONS CLASSIFIED 

ELSEWHERE   SNOMED Code(s): 604951284


   





(2) Pancytopenia due to antineoplastic chemotherapy


Narrative/Plan: 


Transfuse for hemoglobin less than 7 or symptomatic.  Hemoglobin 7.7





Transfuse for platelets less than 10,000 unless symptomatic.  No transfusion 

today, platelets 12,000





Patient did receive G-CSF after treatment.  That was on the 18th.  Patient 

started short acting G-CSF 7/28.. ANC 0.7


Current Visit: Yes   Status: Chronic   Priority: Medium   Code(s): D61.810 - 

ANTINEOPLASTIC CHEMOTHERAPY INDUCED PANCYTOPENIA; T45.1X5A - ADVERSE EFFECT OF 

ANTINEOPLASTIC AND IMMUNOSUP DRUGS, INIT   SNOMED Code(s): 608771558913882


   





(3) MDS (myelodysplastic syndrome), high grade


Narrative/Plan: 


Patient is now status post 2 of the recommended 3 treatments suggested by [facility]

ematology to see if able to get some control of his disease.  Pt is entering 

recovery from sebastian.  We will see how he is able to recuperate.  Further 

recommendations for treatment to follow.








Current Visit: No   Status: Chronic   Priority: Medium   Code(s): D46.Z - OTHER 

MYELODYSPLASTIC SYNDROMES   SNOMED Code(s): 192307440


   





(4) Thrush, oral


Narrative/Plan: 


Coating of the tongue is a little bit less.  Continue oral suspension for 

treatment


Current Visit: Yes   Status: Acute   Priority: Medium   Code(s): B37.0 - 

CANDIDAL STOMATITIS   SNOMED Code(s): 97980841


   


Plan: 





ID was going to review the computed tomography scan, pending sputum cultures, 

may consider bronchoscopy versus percutaneous biopsy to see what is going on in 

the patient's lung once WBC/ANC and platelets stable.  Pending recommendations. 







Dr. Dos Santos reviewed with the patient that he is nearing his end of sebastian and we 

would anticipate over the next several days that his counts will stabilize and 

recover.  With recuperation of his white blood cell count hopefully infection in

the lungs can be treated.  Once recovered options to reevaluate include 

bronchoscopy and biopsy or follow-up with a CT of the chest to reevaluate the 

suspicious areas.  Continue treatments and supportive care.





Discussed case with Internal Medicine.





Updated wife on plan.








Doctor attests:  I performed a history and physical examination of this patient,

developed impression and plan of care.  Discussed with dictator.  I agree with 

dictators note, documented as a scribe.

## 2020-07-30 NOTE — PN
PROGRESS NOTE



DATE OF SERVICE:

07/30/2020



REASON FOR FOLLOWUP:

Febrile neutropenia.



INTERVAL HISTORY:

The patient did spike another fever this morning of 102 degrees Fahrenheit.  The

patient is afebrile since then.  The patient is breathing comfortably.  The patient

does have a cough.  Denies any worsening.  No chest pain.  No nausea, no vomiting.  No

abdominal pain or diarrhea.



PHYSICAL EXAMINATION:

Blood pressure is 97/54 with a pulse of 58, temperature 98.3. He is 97% on room air.

General description is an elderly male lying in bed in no distress.

RESPIRATORY SYSTEM: Unlabored breathing with decreased intensity of breath sounds. No

wheeze.

HEART: S1, S2.  Regular rate and rhythm.

ABDOMEN: Soft. No tenderness.



LABS:

White count of 0.7.



DIAGNOSTIC IMPRESSION AND PLAN:

Patient with febrile neutropenia with evidence of cavitary pneumonia in this patient

who did have prolonged neutropenia with concern for possible aspergillus. Voriconazole

has been added. Waiting for the workup to be completed.  Continue Zosyn and monitor his

clinical course closely.





MMODL / IJN: [id number] / Job#: [id number]

## 2020-07-30 NOTE — P.PN
Subjective


Progress Note Date: 07/30/20








70-year-old male one of Dr. Luu's patient with past medical history of 

anemia, gout, tobacco use, daily alcohol use and myelodysplasia who has been set

pancytopenic for the last 2 month was started on chemotherapy with hematology 

recently was hospitalized for extended period of time for severe pancytopenia 

and severe neutropenia did not recover.  Patient apparently went back on 

chemotherapy after he left the hospital last time.  Today found to have severe 

anemia with hemoglobin of 5.1 with severe neutropenia white blood cell 1.3 and 

thrombocytopenia with platelets 28,000 only.  Patient brought to the [facility] emergency department where was seen and evaluated has been 

very weak has not been able to ambulate and walk and has been having worsening 

shortness of breath with dyspnea with minimal exertion for the last few days.  

With a current hemoglobin his oncologist and instructed him to come to the 

hospital for admission for transfusion.





7/24: Patient is seen today on the [facility] floor.  He is status post 2 units of 

packed RBCs and repeat lab work reveals hemoglobin of 7.3, WBC 0.9, platelet 

count 18.  Electrolytes normal, BUN 32 and creatinine 0.89.  Calcium 7.9, total 

bilirubin 1.5, AST 42, ALT 40, alkaline phosphatase 66.  Temperature max 100.0, 

heart rate 76, blood pressure 115/66, pulse ox 96% on room air.  Consult in 

place for oncology.  COVID-19 is pending.





7/25: Hemoglobin currently is at 7.1, platelet count of 16, double basic count 

of 0.7 oncology thinks that he has myelodysplasia,patient wants to stay another 

day just in case he needs another blood products, as it's difficult for him to 

be transported back and forth with the wife who has been on a wheelchair, 

patient has chronic cough from smoking, denies any feverhowever T-max is 100.0 

within the past 24 hours, Covid testc negative,chest x-ray shows no acute 

pulmonary disease, has clearing of pulmonary edema and pleural fluid compared to

old exam,urinalyses WBC of 2,blood cultures would be sent secondary to the 

fever, consult Dr. Hernandez for neutropenic fever, unknown primary at this 

time,start patient on cefotaxime,  Levaquin, we will obtain high resolution CT 

of the chest, CT of the abdomen fromJuly 2, 2020 was reviewed , CT June 28, 2 cm

stellate mass right upper lobe, 4 x 2 cm hypodensity posterior spleen no 

hydronephrosis no pulmonary has seen the patient, consult with Dr. Tabitha peres





7/26: Patient is being transfused currently for hemoglobin of 6.0, along with pl

atelet transfusion, both are irritated, platelet is down to 9, currently on IV 

cefepime with Jovanni, Dr. Chaparro is following, CAT scan of the chest shows multiple 

pulmonary infiltrates including a spiculated lesion 16 x 10 x 13 mm right upper 

lobe, 1.5 cm noncalcified subpleural nodular density posterior segment right 

upper lobe, 7 mm noncalcified nodule right lower lobe, 2.3, dictation spiculated

left lower lobe, posterior basal segment, suspected inflammatory changes, Dr. Mckeon pulmonary has been consulted, with recommendations for follow-up CT in 3 

months, sputum culture sent today patient still has cough no hemoptysis, no 

conversational dyspnea, no active wheezing, T-max of 98.2, blood pressure is 

between 90-98 systolic pressures, nonlabored breathing pulse ox room air 98%





7/27: Patient has been seen by Dr. Mckeon with recommendations for 3 month 

follow-up for repeat CAT scan.  Patient has been continued on vancomycin, and 

cefepime by Dr. Hernandez.  We will discontinue the Levaquin that was added by 

oncology for prophylaxis.  Patient does have a temperature max of 102.6, heart 

rate 79, blood pressure 95/53, pulse ox 97% on room air.  Repeat blood work will

be ordered for today.  Patient has remained pancytopenic over the weekend.





7/28: Yesterday's blood work revealed a hemoglobin of 7, WBC 0.5, platelet count

9 and patient received 1 unit of platelets and oncology started Zarxio.  Repeat 

blood work for today reveals WBC of 0.5, hemoglobin 6.7, platelet count 23.  BUN

30 creatinine 1.25.  Blood sugar 105.  C-reactive protein is 209.5.  Patient 

denies any new complaints.  No chest pain or shortness of breath.  No cough.  

Temperature max is 102.8 at 7 PM, heart rate 72, blood pressure 88/37, pulse ox 

96% on room air. Pulmonary medicine does not plan for bronchoscopies at this 

time.  Dr. Hernandez has changed and biotics to Zosyn.





7/29: Patient is sleeping at the time of evaluation.  He wakes easily to verbal 

stimuli.  Temperature maximum 100.6, heart rate 80, blood pressure 102/52, pulse

ox 98% on room air.  Patient has had diarrhea and C. difficile toxin is 

negative.  Legionella is negative.  Lab work yesterday revealed hemoglobin of 

6.7, W BC 0.5 and platelets of 23.  He received a unit of packed RBCs last 

evening.  Repeat lab work this morning reveals WBC of 0.6, hemoglobin 6.6, 

platelet count 17.  Patient is continued on Zosyn.  He is on salt and soda 

mouthwash for oral lesions.  He has also been started on Zarxio.





7/30: Repeat blood work reveals WBC 0.7, hemoglobin 7.7, platelet count 12.  He 

is now status post 5 units of packed RBCs and 2 units of platelets.  Dr. Hernandez 

started voriconazole IV yesterday.  Patient is complaining of clear sputum 

production.  He has a history of smoking for greater than 65 years.  Oncology is

anticipating that his counts will stabilize and recover in the next several 

days.  Once recovered, would like bronchoscopy or biopsy of spiculated lesion 

found on CT of the chest.  Temperature max 102.6.  Heart rate 72, blood pressure

100/59, pulse ox 95% on room air.





Review of Systems


CONSTITUTIONAL: Well-developed no acute respiratory distress.  Denies chills.  

Documented fever.


EYES: No icterus sclerae, no conjunctivitis.


EARS, NOSE, MOUTH, THROAT, and FACE: No sore throat, lymphadenopathy, carotid 

bruits or deformity.


RESPIRATORY: Positive shortness of breath cough with sputum production wheezes.


CARDIOVASCULAR: No CP, Palpitation, PND, Orthopnea, or angina.


GASTROINTESTINAL: Mild change in bowel habit with diarrhea and slight dyspepsia


GENITOURINARY: Negative for Hematuria or UTI, no kidney stones.


INTEGUMENT/BREAST: Negative for any muscular injury with mild osteoarthritis..


HEMATOLOGIC/LYMPHATIC: History of myelodysplasia severe anemia thrombocytopenia 

and neutropenia.


MUSCULOSKELTAL: Negative for Myalgia or arthralgia.


NEURLOGICAL: No LOC, Sz or syncope, blurred vision dizziness or abnormality..


BEHAVIORAL/PSYCH: Negative.


ENDOCRINE: Negative.





Physical Examination


General Appearance: Alert, cooperative, no distress, appears stated age.  

Patient in bed and appears to be in no acute distress.


Neck HEENT: Supple, no lymphadenopathy, no thyroid enlargement, no carotid 

bruits.


Lungs: Decreased breaths bilaterally.


Chest Wall: Decrease expansion with deep inspiration no tenderness and no 

deformity was found on exam, no costochondral pain.


Heart: Regular rate and rhythm, S1, S2 mild tachycardia with systolic murmur.


Back: Symmetric, no curvature, ROM normal, no CVA tenderness.


Abdomen: Soft positive bowel sound slight discomfort in the mid abdominal area 

with mild organomegaly with multiple bruises no rebound or rigidity.


Extremities: Extremities trace edema with multiple bruises positive mild 

ecchymosis as well.


Pulses: 2+ and symmetric.


Skin: Skin color, texture, tugor normal, no rashes or lesions.


Neurologic: Alert oriented x3 cranial nerves II through XII intact, no motor 

deficit, no abnormal balance or gait.








Assessment and Plan


1 acute symptomatic anemia.  Patient is status post transfusion of 5 units 

packed RBCs.  Continue Protonix. Zarxio.





2 severe myelodysplasia.  Oncology consult appreciated.  Continue Zosyn.





3 severe neutropenia.  Oncology consult, Zarxio started.  In the meanwhile 

patient will go back on acyclovir, Diflucan.  Antimicrobials changed to Zosyn 

and added voriconazole IV.





4.  Pancytopenia.  Patient is status post transfusion of 4 units packed RBCs and

2 unit of platelets.  Patient is followed by oncology.





5 COPD without exacerbation.  Continue albuterol nebulizer 3 times daily.  





6 chronic edema: More diastolic congestive heart failure and chronic continue 

furosemide at 40 mg daily.





7 recurrent pneumonia: Patient is asymptomatic at this point.  Continue inhaler 

and antibiotics.





8 multiple pulmonary infiltrates including a spiculated 16 x 10 x 30 mm 

infiltrate in the right upper lobe.  Need for biopsy at later time.





9 GI prophylaxis: pantoprazole 40 mg daily.





9 DVT prophylaxis: Patient will stay on Venodyne boots and knee-high HAKAN hose.





10 COVID-19 infection not present.





CODE STATUS: Full code.





Discharge plan: [facility] care and palliative care.





Impression and plan of care have been directed as dictated by the signing 

physician.  Brittney Bennett nurse practitioner acting as scribe for signing 

physician.





Objective





- Vital Signs


Vital signs: 


                                   Vital Signs











Temp  97.9 F   07/30/20 06:18


 


Pulse  76   07/30/20 07:30


 


Resp  16   07/30/20 05:00


 


BP  100/59   07/30/20 05:00


 


Pulse Ox  95   07/30/20 05:00








                                 Intake & Output











 07/29/20 07/30/20 07/30/20





 18:59 06:59 18:59


 


Intake Total 710  


 


Output Total 200 350 


 


Balance 510 -350 


 


Weight 55.792 kg  


 


Intake:   


 


  Intake, IV Titration 100  





  Amount   


 


    Piperacillin-Tazobactam 3 100  





    .375 gm In Sodium   





    Chloride 0.9% 100 ml @ 25   





    mls/hr IVPB Q8HR [facility] Rx#   





    [id number]   


 


  Oral 300  


 


  Blood Product 310  


 


    Rc Irr As1  Unit 310  





    [id number]   


 


Output:   


 


  Urine 200 350 


 


Other:   


 


  Voiding Method Toilet Toilet 





 Urinal Urinal 


 


  # Bowel Movements 1  














- Labs


CBC & Chem 7: 


                                 07/30/20 06:23





                                 07/29/20 06:57


Labs: 


                  Abnormal Lab Results - Last 24 Hours (Table)











  07/28/20 07/30/20 Range/Units





  10:56 06:23 


 


WBC   0.7 L*  (3.8-10.6)  k/uL


 


RBC   2.33 L  (4.30-5.90)  m/uL


 


Hgb   7.7 L  (13.0-17.5)  gm/dL


 


Hct   23.2 L  (39.0-53.0)  %


 


RDW   21.5 H  (11.5-15.5)  %


 


Plt Count   12 L*  (150-450)  k/uL


 


Crossmatch  See Detail   








                      Microbiology - Last 24 Hours (Table)











 07/24/20 20:34 Blood Culture - Preliminary





 Blood    No Growth after 120 hours


 


 07/25/20 17:56 Blood Culture - Preliminary





 Blood    No Growth after 96 hours


 


 07/29/20 00:40 Stool Culture - Preliminary





 Stool

## 2020-07-31 LAB
ALBUMIN SERPL-MCNC: 2.5 G/DL (ref 3.5–5)
ALP SERPL-CCNC: 57 U/L (ref 38–126)
ALT SERPL-CCNC: 38 U/L (ref 4–49)
ANION GAP SERPL CALC-SCNC: 6 MMOL/L
AST SERPL-CCNC: 41 U/L (ref 17–59)
BASOPHILS # BLD AUTO: 0 K/UL (ref 0–0.2)
BASOPHILS NFR BLD AUTO: 2 %
BUN SERPL-SCNC: 26 MG/DL (ref 9–20)
CALCIUM SPEC-MCNC: 7.5 MG/DL (ref 8.4–10.2)
CHLORIDE SERPL-SCNC: 106 MMOL/L (ref 98–107)
CO2 SERPL-SCNC: 27 MMOL/L (ref 22–30)
EOSINOPHIL # BLD AUTO: 0 K/UL (ref 0–0.7)
EOSINOPHIL NFR BLD AUTO: 1 %
ERYTHROCYTE [DISTWIDTH] IN BLOOD BY AUTOMATED COUNT: 2.34 M/UL (ref 4.3–5.9)
ERYTHROCYTE [DISTWIDTH] IN BLOOD: 22 % (ref 11.5–15.5)
GLUCOSE SERPL-MCNC: 88 MG/DL (ref 74–99)
HCT VFR BLD AUTO: 23.7 % (ref 39–53)
HGB BLD-MCNC: 7.7 GM/DL (ref 13–17.5)
LYMPHOCYTES # SPEC AUTO: 0.4 K/UL (ref 1–4.8)
LYMPHOCYTES NFR SPEC AUTO: 83 %
MCH RBC QN AUTO: 32.7 PG (ref 25–35)
MCHC RBC AUTO-ENTMCNC: 32.4 G/DL (ref 31–37)
MCV RBC AUTO: 100.9 FL (ref 80–100)
MONOCYTES # BLD AUTO: 0 K/UL (ref 0–1)
MONOCYTES NFR BLD AUTO: 1 %
NEUTROPHILS # BLD AUTO: 0 K/UL (ref 1.3–7.7)
NEUTROPHILS NFR BLD AUTO: 6 %
PLATELET # BLD AUTO: 11 K/UL (ref 150–450)
POTASSIUM SERPL-SCNC: 3.2 MMOL/L (ref 3.5–5.1)
PROT SERPL-MCNC: 5.9 G/DL (ref 6.3–8.2)
SODIUM SERPL-SCNC: 139 MMOL/L (ref 137–145)
WBC # BLD AUTO: 0.5 K/UL (ref 3.8–10.6)

## 2020-07-31 RX ADMIN — FUROSEMIDE SCH MG: 40 TABLET ORAL at 07:58

## 2020-07-31 RX ADMIN — Medication SCH ML: at 20:17

## 2020-07-31 RX ADMIN — ISODIUM CHLORIDE SCH: 0.03 SOLUTION RESPIRATORY (INHALATION) at 11:18

## 2020-07-31 RX ADMIN — LINEZOLID SCH MLS/HR: 600 INJECTION, SOLUTION INTRAVENOUS at 10:13

## 2020-07-31 RX ADMIN — ACETAMINOPHEN PRN MG: 325 TABLET, FILM COATED ORAL at 12:34

## 2020-07-31 RX ADMIN — PIPERACILLIN AND TAZOBACTAM SCH MLS/HR: 3; .375 INJECTION, POWDER, FOR SOLUTION INTRAVENOUS at 00:34

## 2020-07-31 RX ADMIN — DICYCLOMINE HYDROCHLORIDE SCH MG: 20 TABLET ORAL at 07:58

## 2020-07-31 RX ADMIN — Medication SCH ML: at 00:34

## 2020-07-31 RX ADMIN — DICYCLOMINE HYDROCHLORIDE SCH MG: 20 TABLET ORAL at 21:24

## 2020-07-31 RX ADMIN — FUROSEMIDE SCH: 40 TABLET ORAL at 09:30

## 2020-07-31 RX ADMIN — VORICONAZOLE SCH MG: 200 TABLET, FILM COATED ORAL at 07:59

## 2020-07-31 RX ADMIN — ISODIUM CHLORIDE SCH MG: 0.03 SOLUTION RESPIRATORY (INHALATION) at 18:58

## 2020-07-31 RX ADMIN — NYSTATIN SCH: 100000 SUSPENSION ORAL at 12:35

## 2020-07-31 RX ADMIN — THERA TABS SCH EACH: TAB at 07:58

## 2020-07-31 RX ADMIN — Medication SCH ML: at 16:16

## 2020-07-31 RX ADMIN — ACYCLOVIR SCH MG: 200 CAPSULE ORAL at 16:16

## 2020-07-31 RX ADMIN — Medication SCH ML: at 05:52

## 2020-07-31 RX ADMIN — ACETAMINOPHEN PRN MG: 325 TABLET, FILM COATED ORAL at 17:41

## 2020-07-31 RX ADMIN — NYSTATIN SCH UNIT: 100000 SUSPENSION ORAL at 07:59

## 2020-07-31 RX ADMIN — PIPERACILLIN AND TAZOBACTAM SCH MLS/HR: 3; .375 INJECTION, POWDER, FOR SOLUTION INTRAVENOUS at 16:16

## 2020-07-31 RX ADMIN — FILGRASTIM-SNDZ SCH MCG: 480 INJECTION, SOLUTION INTRAVENOUS; SUBCUTANEOUS at 07:58

## 2020-07-31 RX ADMIN — Medication SCH ML: at 00:36

## 2020-07-31 RX ADMIN — NYSTATIN SCH: 100000 SUSPENSION ORAL at 17:40

## 2020-07-31 RX ADMIN — VORICONAZOLE SCH MG: 200 TABLET, FILM COATED ORAL at 21:23

## 2020-07-31 RX ADMIN — POTASSIUM CHLORIDE SCH MEQ: 20 TABLET, EXTENDED RELEASE ORAL at 07:58

## 2020-07-31 RX ADMIN — NYSTATIN SCH UNIT: 100000 SUSPENSION ORAL at 21:24

## 2020-07-31 RX ADMIN — ISODIUM CHLORIDE SCH: 0.03 SOLUTION RESPIRATORY (INHALATION) at 07:51

## 2020-07-31 RX ADMIN — LINEZOLID SCH MLS/HR: 600 INJECTION, SOLUTION INTRAVENOUS at 23:24

## 2020-07-31 RX ADMIN — ACYCLOVIR SCH MG: 200 CAPSULE ORAL at 21:23

## 2020-07-31 RX ADMIN — ACYCLOVIR SCH MG: 200 CAPSULE ORAL at 07:57

## 2020-07-31 RX ADMIN — VORICONAZOLE SCH MG: 200 TABLET, FILM COATED ORAL at 00:35

## 2020-07-31 RX ADMIN — ACETAMINOPHEN PRN MG: 325 TABLET, FILM COATED ORAL at 05:51

## 2020-07-31 RX ADMIN — PANTOPRAZOLE SODIUM SCH MG: 40 TABLET, DELAYED RELEASE ORAL at 07:58

## 2020-07-31 RX ADMIN — PIPERACILLIN AND TAZOBACTAM SCH MLS/HR: 3; .375 INJECTION, POWDER, FOR SOLUTION INTRAVENOUS at 07:42

## 2020-07-31 RX ADMIN — Medication SCH: at 12:35

## 2020-07-31 RX ADMIN — DICYCLOMINE HYDROCHLORIDE SCH: 20 TABLET ORAL at 12:35

## 2020-07-31 RX ADMIN — DICYCLOMINE HYDROCHLORIDE SCH MG: 20 TABLET ORAL at 17:41

## 2020-07-31 NOTE — P.PN
Subjective


Progress Note Date: 07/31/20








70-year-old male one of Dr. Luu's patient with past medical history of 

anemia, gout, tobacco use, daily alcohol use and myelodysplasia who has been set

pancytopenic for the last 2 month was started on chemotherapy with hematology 

recently was hospitalized for extended period of time for severe pancytopenia 

and severe neutropenia did not recover.  Patient apparently went back on 

chemotherapy after he left the hospital last time.  Today found to have severe 

anemia with hemoglobin of 5.1 with severe neutropenia white blood cell 1.3 and 

thrombocytopenia with platelets 28,000 only.  Patient brought to the [facility] emergency department where was seen and evaluated has been 

very weak has not been able to ambulate and walk and has been having worsening 

shortness of breath with dyspnea with minimal exertion for the last few days.  

With a current hemoglobin his oncologist and instructed him to come to the 

hospital for admission for transfusion.





7/24: Patient is seen today on the [facility] floor.  He is status post 2 units of 

packed RBCs and repeat lab work reveals hemoglobin of 7.3, WBC 0.9, platelet 

count 18.  Electrolytes normal, BUN 32 and creatinine 0.89.  Calcium 7.9, total 

bilirubin 1.5, AST 42, ALT 40, alkaline phosphatase 66.  Temperature max 100.0, 

heart rate 76, blood pressure 115/66, pulse ox 96% on room air.  Consult in 

place for oncology.  COVID-19 is pending.





7/25: Hemoglobin currently is at 7.1, platelet count of 16, double basic count 

of 0.7 oncology thinks that he has myelodysplasia,patient wants to stay another 

day just in case he needs another blood products, as it's difficult for him to 

be transported back and forth with the wife who has been on a wheelchair, 

patient has chronic cough from smoking, denies any feverhowever T-max is 100.0 

within the past 24 hours, Covid testc negative,chest x-ray shows no acute 

pulmonary disease, has clearing of pulmonary edema and pleural fluid compared to

old exam,urinalyses WBC of 2,blood cultures would be sent secondary to the 

fever, consult Dr. Hernandez for neutropenic fever, unknown primary at this 

time,start patient on cefotaxime,  Levaquin, we will obtain high resolution CT 

of the chest, CT of the abdomen fromJuly 2, 2020 was reviewed , CT June 28, 2 cm

stellate mass right upper lobe, 4 x 2 cm hypodensity posterior spleen no 

hydronephrosis no pulmonary has seen the patient, consult with Dr. Tabitha peres





7/26: Patient is being transfused currently for hemoglobin of 6.0, along with pl

atelet transfusion, both are irritated, platelet is down to 9, currently on IV 

cefepime with Jovanni, Dr. Chaparro is following, CAT scan of the chest shows multiple 

pulmonary infiltrates including a spiculated lesion 16 x 10 x 13 mm right upper 

lobe, 1.5 cm noncalcified subpleural nodular density posterior segment right 

upper lobe, 7 mm noncalcified nodule right lower lobe, 2.3, dictation spiculated

left lower lobe, posterior basal segment, suspected inflammatory changes, Dr. Mckeon pulmonary has been consulted, with recommendations for follow-up CT in 3 

months, sputum culture sent today patient still has cough no hemoptysis, no 

conversational dyspnea, no active wheezing, T-max of 98.2, blood pressure is 

between 90-98 systolic pressures, nonlabored breathing pulse ox room air 98%





7/27: Patient has been seen by Dr. Mckeon with recommendations for 3 month 

follow-up for repeat CAT scan.  Patient has been continued on vancomycin, and 

cefepime by Dr. Hernandez.  We will discontinue the Levaquin that was added by 

oncology for prophylaxis.  Patient does have a temperature max of 102.6, heart 

rate 79, blood pressure 95/53, pulse ox 97% on room air.  Repeat blood work will

be ordered for today.  Patient has remained pancytopenic over the weekend.





7/28: Yesterday's blood work revealed a hemoglobin of 7, WBC 0.5, platelet count

9 and patient received 1 unit of platelets and oncology started Zarxio.  Repeat 

blood work for today reveals WBC of 0.5, hemoglobin 6.7, platelet count 23.  BUN

30 creatinine 1.25.  Blood sugar 105.  C-reactive protein is 209.5.  Patient 

denies any new complaints.  No chest pain or shortness of breath.  No cough.  

Temperature max is 102.8 at 7 PM, heart rate 72, blood pressure 88/37, pulse ox 

96% on room air. Pulmonary medicine does not plan for bronchoscopies at this 

time.  Dr. Hernandez has changed and biotics to Zosyn.





7/29: Patient is sleeping at the time of evaluation.  He wakes easily to verbal 

stimuli.  Temperature maximum 100.6, heart rate 80, blood pressure 102/52, pulse

ox 98% on room air.  Patient has had diarrhea and C. difficile toxin is 

negative.  Legionella is negative.  Lab work yesterday revealed hemoglobin of 

6.7, W BC 0.5 and platelets of 23.  He received a unit of packed RBCs last 

evening.  Repeat lab work this morning reveals WBC of 0.6, hemoglobin 6.6, 

platelet count 17.  Patient is continued on Zosyn.  He is on salt and soda 

mouthwash for oral lesions.  He has also been started on Zarxio.





7/30: Repeat blood work reveals WBC 0.7, hemoglobin 7.7, platelet count 12.  He 

is now status post 5 units of packed RBCs and 2 units of platelets.  Dr. Hernandez 

started voriconazole IV yesterday.  Patient is complaining of clear sputum 

production.  He has a history of smoking for greater than 65 years.  Oncology is

anticipating that his counts will stabilize and recover in the next several 

days.  Once recovered, would like bronchoscopy or biopsy of spiculated lesion 

found on CT of the chest.  Temperature max 102.6.  Heart rate 72, blood pressure

100/59, pulse ox 95% on room air.





7/31: Patient is seen today in follow-up.  He denies having any chest pain or s

hortness of breath.  He is having loose stool daily.  He is eating well.  He 

denies any nausea or vomiting.  His temperature maximum 103.1, heart rate 97, 

blood pressure 118/65, pulse ox 94% on 3 L nasal cannula.  Blood culture was 

obtained last night and patient received IV Tylenol last night.  He is currently

on Zyvox added to Zosyn and Voriconazole. Beta-1,3-D-glucan and serum 

galactomannan are pendind.  Repeat blood work reveals WBC 0.5, hemoglobin 7.7, 

platelet count 11.  Potassium 3.2, BUN 26 and creatinine 1.42.  Calcium 7.5.  

Blood cultures from July 24 and July 25 are showing no growth.  Sputum culture 

finalized with contaminated oral pravin.  Stool culture is in process.





Review of Systems


CONSTITUTIONAL: Thin, no acute respiratory distress.  Denies chills.  Documented

fever.


EYES: No icterus sclerae, no conjunctivitis.


EARS, NOSE, MOUTH, THROAT, and FACE: No sore throat, lymphadenopathy, carotid 

bruits or deformity.


RESPIRATORY: Positive shortness of breath cough with sputum production wheezes.


CARDIOVASCULAR: No CP, Palpitation, PND, Orthopnea, or angina.


GASTROINTESTINAL: Mild change in bowel habit with diarrhea and slight dyspepsia


GENITOURINARY: Negative for Hematuria or UTI, no kidney stones.


INTEGUMENT/BREAST: Negative for any muscular injury with mild osteoarthritis..


HEMATOLOGIC/LYMPHATIC: History of myelodysplasia severe anemia thrombocytopenia 

and neutropenia.


MUSCULOSKELTAL: Negative for Myalgia or arthralgia.


NEURLOGICAL: No LOC, Sz or syncope, blurred vision dizziness or abnormality..


BEHAVIORAL/PSYCH: Negative.


ENDOCRINE: Negative.





Physical Examination


General Appearance: Alert, cooperative, no distress, appears stated age.  

Cachectic appearing 70-year-old  male.  Patient in bed and appears to 

be in no acute distress.


Neck HEENT: Supple, no lymphadenopathy, no thyroid enlargement, no carotid 

bruits.


Lungs: Decreased breaths bilaterally.


Chest Wall: Decrease expansion with deep inspiration no tenderness and no 

deformity was found on exam, no costochondral pain.


Heart: Regular rate and rhythm, S1, S2 mild tachycardia with systolic murmur.


Back: Symmetric, no curvature, ROM normal, no CVA tenderness.


Abdomen: Soft positive bowel sound slight discomfort in the mid abdominal area 

with mild organomegaly with multiple bruises no rebound or rigidity.


Extremities: Extremities trace edema with multiple bruises positive mild 

ecchymosis as well.


Pulses: 2+ and symmetric.


Skin: Skin color, texture, tugor normal, no rashes or lesions.


Neurologic: Alert oriented x3 cranial nerves II through XII intact, no motor 

deficit, no abnormal balance or gait.








Assessment and Plan


1 acute symptomatic anemia.  Patient is status post transfusion of 5 units 

packed RBCs.  Continue Protonix. Zarxio.





2 severe myelodysplasia.  Oncology consult appreciated.  Continue Zosyn.





3 severe febrile neutropenia, possible sepsis secondary to cavitary pneumonia.  

Oncology consult, continue Zarxio.  Continue acyclovir.  Antimicrobials in the 

form of Zyvox, Zosyn and voriconazole IV.  Consult with Dr. Hernandez appreciated.





4.  Pancytopenia.  Patient is status post transfusion of 5 units packed RBCs and

2 unit of platelets.  Patient is followed by oncology.





5 COPD without exacerbation.  Continue albuterol nebulizer 3 times daily.  





6 chronic diastolic heart failure.  Continue furosemide at 40 mg daily.





7 recurrent pneumonia: Patient is asymptomatic at this point.  Continue inhaler 

and antibiotics.





8 multiple pulmonary infiltrates including a spiculated 16 x 10 x 30 mm 

infiltrate in the right upper lobe.  Need for biopsy at later time.





9 severe protein calorie malnutrition with BMI of 16 





GI prophylaxis: pantoprazole 40 mg daily.





9 DVT prophylaxis: Patient will stay on Venodyne boots and knee-high HAKAN hose.





10 COVID-19 infection not present.





CODE STATUS: Full code.





Discharge plan: [facility] care and palliative care.





Impression and plan of care have been directed as dictated by the signing 

physician.  Brittney Bennett nurse practitioner acting as scribe for signing 

physician.





Objective





- Vital Signs


Vital signs: 


                                   Vital Signs











Temp  101.8 F H  07/31/20 06:01


 


Pulse  97   07/31/20 05:00


 


Resp  18   07/31/20 05:00


 


BP  118/65   07/31/20 06:01


 


Pulse Ox  94 L  07/31/20 05:00








                                 Intake & Output











 07/30/20 07/31/20 07/31/20





 18:59 06:59 18:59


 


Intake Total 225 750 


 


Output Total 200  


 


Balance 25 750 


 


Intake:   


 


  Intake, IV Titration 225 150 





  Amount   


 


    Piperacillin-Tazobactam 3 100 150 





    .375 gm In Sodium   





    Chloride 0.9% 100 ml @ 25   





    mls/hr IVPB Q8HR [facility] Rx#   





    :[id number]   


 


    Voriconazole 300 mg In 125  





    Sodium Chloride 0.9% 250   





    ml @ 125 mls/hr IVPB   





    Q12HR [facility] Rx#:[id number]   


 


  Oral  600 


 


Output:   


 


  Urine 200  


 


Other:   


 


  Voiding Method Toilet Toilet 





 Urinal Urinal 


 


  # Voids 1 1 














- Labs


CBC & Chem 7: 


                                 07/31/20 07:41





                                 07/31/20 07:41


Labs: 


                  Abnormal Lab Results - Last 24 Hours (Table)











  07/30/20 Range/Units





  12:22 


 


POC Glucose (mg/dL)  102 H  (75-99)  mg/dL








                      Microbiology - Last 24 Hours (Table)











 07/24/20 20:34 Blood Culture - Final





 Blood    No Growth after 144 hours


 


 07/25/20 17:56 Blood Culture - Preliminary





 Blood    No Growth after 120 hours

## 2020-07-31 NOTE — P.PN
Subjective


Progress Note Date: 07/31/20


Principal diagnosis: 





Pancytopenia





Objective





- Vital Signs


Vital signs: 


                                   Vital Signs











Temp  98.6 F   07/31/20 13:10


 


Pulse  86   07/31/20 14:51


 


Resp  17   07/31/20 13:10


 


BP  107/55   07/31/20 13:10


 


Pulse Ox  97   07/31/20 13:10








                                 Intake & Output











 07/30/20 07/31/20 07/31/20





 18:59 06:59 18:59


 


Intake Total 225 750 500


 


Output Total 200  


 


Balance 25 750 500


 


Intake:   


 


  Intake, IV Titration 225 150 





  Amount   


 


    Piperacillin-Tazobactam 3 100 150 





    .375 gm In Sodium   





    Chloride 0.9% 100 ml @ 25   





    mls/hr IVPB Q8HR [facility] Rx#   





    :[id number]   


 


    Voriconazole 300 mg In 125  





    Sodium Chloride 0.9% 250   





    ml @ 125 mls/hr IVPB   





    Q12HR [facility] Rx#:[id number]   


 


  Oral  600 500


 


Output:   


 


  Urine 200  


 


Other:   


 


  Voiding Method Toilet Toilet Urinal





 Urinal Urinal 


 


  # Voids 1 1 1














- Exam





- Constitutional


General appearance: Present: cooperative, no acute distress, thin





- EENT


Eyes: Present: anicteric sclerae, EOMI


ENT: Present: hearing grossly normal





- Respiratory


Details: 





Diminished left lower lobe, right upper lobe anteriorly otherwise clear to 

auscultation, respiratory effort is unlabored, chest expansion is symmetrical





- Cardiovascular


Rhythm: regular


Heart sounds: normal: S1, S2


Abnormal Heart Sounds: Absent: systolic murmur, diastolic murmur, rub, S3 

Gallop, S4 Gallop, click, other





- Peripheral edema


  ** leg


Peripheral Edema: bilateral: None





- Gastrointestinal


General gastrointestinal: Present: normal bowel sounds, scaphoid, soft





- Neurologic


Neurologic: Present: CNII-XII intact





- Musculoskeletal


Musculoskeletal: Present: generalized weakness, strength equal bilaterally





- Psychiatric


Psychiatric: Present: A&O x's 3, appropriate affect, intact judgment & insight





- Labs


CBC & Chem 7: 


                                 07/31/20 07:41





                                 07/31/20 07:41


Labs: 


                  Abnormal Lab Results - Last 24 Hours (Table)











  07/31/20 07/31/20 Range/Units





  07:41 07:41 


 


WBC  0.5 L*   (3.8-10.6)  k/uL


 


RBC  2.34 L   (4.30-5.90)  m/uL


 


Hgb  7.7 L   (13.0-17.5)  gm/dL


 


Hct  23.7 L   (39.0-53.0)  %


 


MCV  100.9 H   (80.0-100.0)  fL


 


RDW  22.0 H   (11.5-15.5)  %


 


Plt Count  11 L*   (150-450)  k/uL


 


Neutrophils #  0.0 L*   (1.3-7.7)  k/uL


 


Lymphocytes #  0.4 L   (1.0-4.8)  k/uL


 


Potassium   3.2 L  (3.5-5.1)  mmol/L


 


BUN   26 H  (9-20)  mg/dL


 


Creatinine   1.42 H  (0.66-1.25)  mg/dL


 


Calcium   7.5 L  (8.4-10.2)  mg/dL


 


Total Protein   5.9 L  (6.3-8.2)  g/dL


 


Albumin   2.5 L  (3.5-5.0)  g/dL








                      Microbiology - Last 24 Hours (Table)











 07/29/20 00:40 Stool Culture - Preliminary





 Stool    Yeast species


 


 07/24/20 20:34 Blood Culture - Final





 Blood    No Growth after 144 hours


 


 07/25/20 17:56 Blood Culture - Preliminary





 Blood    No Growth after 120 hours














Assessment and Plan


(1) MDS (myelodysplastic syndrome), high grade


Current Visit: No   Status: Chronic   Priority: Medium   Code(s): D46.Z - OTHER 

MYELODYSPLASTIC SYNDROMES   SNOMED Code(s): 220655175


   


Plan: 





Assessment and Recs:





MDS:





Severe symptomatic Anemia:


 - Stable today, no transfusion required


 - He can follow-up with Dr. Callahan regarding continuation of treatment as an 

outpatient


 - Recommend close CBC as outpatient


 - Transfusion with hemoglobin less than 7, platlets less than 10K





Febrile Neutropenia:Spiked T-max 103F today


 - ID is following


 - Repeat Pan Cultures and viral cultures


 - Yeast i stoool 


 - Chest imaging concerning for infectious etiology, would like pulm to consider

bronch please. 


 - Broad Spectrum Antibiotics


 - Zarxio to continue





Thrombocytopenia:


 - 11K today


 - Transfuse less than 10K or if s/s bleeding

## 2020-08-01 LAB
ALBUMIN SERPL-MCNC: 2.4 G/DL (ref 3.5–5)
ALP SERPL-CCNC: 52 U/L (ref 38–126)
ALT SERPL-CCNC: 30 U/L (ref 4–49)
ANION GAP SERPL CALC-SCNC: 6 MMOL/L
AST SERPL-CCNC: 34 U/L (ref 17–59)
BUN SERPL-SCNC: 34 MG/DL (ref 9–20)
CALCIUM SPEC-MCNC: 7.1 MG/DL (ref 8.4–10.2)
CHLORIDE SERPL-SCNC: 107 MMOL/L (ref 98–107)
CO2 SERPL-SCNC: 27 MMOL/L (ref 22–30)
ERYTHROCYTE [DISTWIDTH] IN BLOOD BY AUTOMATED COUNT: 2.24 M/UL (ref 4.3–5.9)
ERYTHROCYTE [DISTWIDTH] IN BLOOD: 21.8 % (ref 11.5–15.5)
GLUCOSE SERPL-MCNC: 79 MG/DL (ref 74–99)
HCT VFR BLD AUTO: 22.8 % (ref 39–53)
HGB BLD-MCNC: 7.3 GM/DL (ref 13–17.5)
MAGNESIUM SPEC-SCNC: 1.3 MG/DL (ref 1.6–2.3)
MCH RBC QN AUTO: 32.6 PG (ref 25–35)
MCHC RBC AUTO-ENTMCNC: 32 G/DL (ref 31–37)
MCV RBC AUTO: 102 FL (ref 80–100)
PLATELET # BLD AUTO: 13 K/UL (ref 150–450)
POTASSIUM SERPL-SCNC: 3.8 MMOL/L (ref 3.5–5.1)
PROT SERPL-MCNC: 5.8 G/DL (ref 6.3–8.2)
SODIUM SERPL-SCNC: 140 MMOL/L (ref 137–145)
WBC # BLD AUTO: 0.6 K/UL (ref 3.8–10.6)

## 2020-08-01 RX ADMIN — ISODIUM CHLORIDE SCH: 0.03 SOLUTION RESPIRATORY (INHALATION) at 19:06

## 2020-08-01 RX ADMIN — THERA TABS SCH EACH: TAB at 08:10

## 2020-08-01 RX ADMIN — CEFAZOLIN SCH MLS/HR: 330 INJECTION, POWDER, FOR SOLUTION INTRAMUSCULAR; INTRAVENOUS at 05:25

## 2020-08-01 RX ADMIN — VORICONAZOLE SCH MG: 200 TABLET, FILM COATED ORAL at 08:10

## 2020-08-01 RX ADMIN — ACETAMINOPHEN PRN MG: 325 TABLET, FILM COATED ORAL at 10:26

## 2020-08-01 RX ADMIN — Medication SCH ML: at 23:57

## 2020-08-01 RX ADMIN — VORICONAZOLE SCH MG: 200 TABLET, FILM COATED ORAL at 21:20

## 2020-08-01 RX ADMIN — LINEZOLID SCH MG: 600 TABLET, FILM COATED ORAL at 21:20

## 2020-08-01 RX ADMIN — Medication SCH ML: at 10:11

## 2020-08-01 RX ADMIN — ACETAMINOPHEN PRN MG: 325 TABLET, FILM COATED ORAL at 02:00

## 2020-08-01 RX ADMIN — NYSTATIN SCH UNIT: 100000 SUSPENSION ORAL at 21:20

## 2020-08-01 RX ADMIN — FILGRASTIM-SNDZ SCH MCG: 480 INJECTION, SOLUTION INTRAVENOUS; SUBCUTANEOUS at 10:10

## 2020-08-01 RX ADMIN — DICYCLOMINE HYDROCHLORIDE SCH MG: 20 TABLET ORAL at 12:09

## 2020-08-01 RX ADMIN — Medication SCH ML: at 16:46

## 2020-08-01 RX ADMIN — ACYCLOVIR SCH MG: 200 CAPSULE ORAL at 08:10

## 2020-08-01 RX ADMIN — POTASSIUM CHLORIDE SCH MEQ: 20 TABLET, EXTENDED RELEASE ORAL at 08:10

## 2020-08-01 RX ADMIN — LINEZOLID SCH MG: 600 TABLET, FILM COATED ORAL at 13:17

## 2020-08-01 RX ADMIN — PANTOPRAZOLE SODIUM SCH MG: 40 TABLET, DELAYED RELEASE ORAL at 08:10

## 2020-08-01 RX ADMIN — IOPAMIDOL PRN ML: 612 INJECTION, SOLUTION INTRAVENOUS at 11:20

## 2020-08-01 RX ADMIN — FUROSEMIDE SCH MG: 40 TABLET ORAL at 08:10

## 2020-08-01 RX ADMIN — NYSTATIN SCH UNIT: 100000 SUSPENSION ORAL at 16:44

## 2020-08-01 RX ADMIN — ACYCLOVIR SCH MG: 200 CAPSULE ORAL at 16:44

## 2020-08-01 RX ADMIN — Medication SCH: at 00:59

## 2020-08-01 RX ADMIN — DICYCLOMINE HYDROCHLORIDE SCH MG: 20 TABLET ORAL at 16:44

## 2020-08-01 RX ADMIN — ACYCLOVIR SCH MG: 200 CAPSULE ORAL at 21:20

## 2020-08-01 RX ADMIN — DICYCLOMINE HYDROCHLORIDE SCH MG: 20 TABLET ORAL at 21:20

## 2020-08-01 RX ADMIN — DICYCLOMINE HYDROCHLORIDE SCH MG: 20 TABLET ORAL at 08:10

## 2020-08-01 RX ADMIN — PIPERACILLIN AND TAZOBACTAM SCH MLS/HR: 3; .375 INJECTION, POWDER, FOR SOLUTION INTRAVENOUS at 02:01

## 2020-08-01 RX ADMIN — PIPERACILLIN AND TAZOBACTAM SCH MLS/HR: 3; .375 INJECTION, POWDER, FOR SOLUTION INTRAVENOUS at 09:24

## 2020-08-01 RX ADMIN — Medication SCH ML: at 05:25

## 2020-08-01 RX ADMIN — ISODIUM CHLORIDE SCH MG: 0.03 SOLUTION RESPIRATORY (INHALATION) at 09:21

## 2020-08-01 RX ADMIN — Medication SCH ML: at 20:19

## 2020-08-01 RX ADMIN — IOPAMIDOL PRN ML: 612 INJECTION, SOLUTION INTRAVENOUS at 10:10

## 2020-08-01 RX ADMIN — NYSTATIN SCH UNIT: 100000 SUSPENSION ORAL at 08:10

## 2020-08-01 RX ADMIN — NYSTATIN SCH UNIT: 100000 SUSPENSION ORAL at 12:08

## 2020-08-01 RX ADMIN — ISODIUM CHLORIDE SCH: 0.03 SOLUTION RESPIRATORY (INHALATION) at 11:54

## 2020-08-01 RX ADMIN — ACETAMINOPHEN PRN MG: 325 TABLET, FILM COATED ORAL at 20:19

## 2020-08-01 RX ADMIN — CEFAZOLIN SCH MLS/HR: 330 INJECTION, POWDER, FOR SOLUTION INTRAMUSCULAR; INTRAVENOUS at 16:44

## 2020-08-01 RX ADMIN — PIPERACILLIN AND TAZOBACTAM SCH MLS/HR: 3; .375 INJECTION, POWDER, FOR SOLUTION INTRAVENOUS at 23:57

## 2020-08-01 RX ADMIN — PIPERACILLIN AND TAZOBACTAM SCH MLS/HR: 3; .375 INJECTION, POWDER, FOR SOLUTION INTRAVENOUS at 16:45

## 2020-08-01 NOTE — CT
EXAMINATION TYPE: CT abdomen pelvis wo con

 

DATE OF EXAM: 8/1/2020

 

COMPARISON: Previous study dated 7/2/2020. 

 

HISTORY: Abdominal pain, rule out colitis.

 

CT DLP: 258.2 mGycm

Automated exposure control for dose reduction was used.

 

FINDINGS: There is atelectasis or consolidation at the right lung base. There continues be a small ri
ght effusion. There is developed a spiculated 2.2 cm mass in the posterior basal segment of the left 
lower lobe. The heart is not enlarged. There is 4.4 mm of pericardial thickening or fluid.

 

Within the abdomen, the liver is upper limits of normal size. The spleen and gallbladder are unremark
able.

 

Both adrenal glands are normal.

 

There is no evidence of nephrolithiasis or hydronephrosis.

 

Limited views of the pancreas are unremarkable.

 

There is mild to moderate atheromatous calcification of the abdominal aorta. The proximal common juvenal
c arteries are aneurysmal measuring 1.5 cm bilaterally. There is no significant retroperitoneal, ingu
inal or iliac adenopathy.

 

The bladder is unremarkable.

 

Small bowel loops are mildly prominent. The colon is largely collapsed. The sigmoid is fluid-filled. 
I do not see convincing evidence of colonic wall thickening. I do not see evidence of the appendix.. 
No free fluid and no free air is seen.

 

There is degenerative disc these and hypertrophic spondylosis within the spine.

 

IMPRESSION: 

1. RIGHT BASILAR AIRSPACE DISEASE WITH A SMALL CONCOMITANT EFFUSION.

2. NEW 2.2 CM MASS IN THE POSTERIOR BASAL SEGMENT OF THE LEFT LOWER LOBE.

3. 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

4. MILD PROMINENCE OF THE PROXIMAL SMALL BOWEL LOOPS.

5. COLLAPSE OF MUCH OF THE COLON WITHOUT EVIDENCE OF COLONIC WALL THICKENING. THE DISTAL SIGMOID COLO
N AND RECTUM ARE FULL OF FLUID.

8. MILD DEGENERATIVE CHANGES WITHIN THE SPINE.

## 2020-08-01 NOTE — P.PN
Subjective


Progress Note Date: 08/01/20


70-year-old male one of Dr. Luu's patient with past medical history of 

anemia, gout, tobacco use, daily alcohol use and myelodysplasia who has been set

pancytopenic for the last 2 month was started on chemotherapy with hematology 

recently was hospitalized for extended period of time for severe pancytopenia 

and severe neutropenia did not recover.  Patient apparently went back on 

chemotherapy after he left the hospital last time.  Today found to have severe 

anemia with hemoglobin of 5.1 with severe neutropenia white blood cell 1.3 and 

thrombocytopenia with platelets 28,000 only.  Patient brought to the [facility] emergency department where was seen and evaluated has been 

very weak has not been able to ambulate and walk and has been having worsening 

shortness of breath with dyspnea with minimal exertion for the last few days.  

With a current hemoglobin his oncologist and instructed him to come to the 

hospital for admission for transfusion.





7/24: Patient is seen today on the [facility] floor.  He is status post 2 units of 

packed RBCs and repeat lab work reveals hemoglobin of 7.3, WBC 0.9, platelet 

count 18.  Electrolytes normal, BUN 32 and creatinine 0.89.  Calcium 7.9, total 

bilirubin 1.5, AST 42, ALT 40, alkaline phosphatase 66.  Temperature max 100.0, 

heart rate 76, blood pressure 115/66, pulse ox 96% on room air.  Consult in 

place for oncology.  COVID-19 is pending.





7/25: Hemoglobin currently is at 7.1, platelet count of 16, double basic count 

of 0.7 oncology thinks that he has myelodysplasia,patient wants to stay another 

day just in case he needs another blood products, as it's difficult for him to 

be transported back and forth with the wife who has been on a wheelchair, 

patient has chronic cough from smoking, denies any feverhowever T-max is 100.0 

within the past 24 hours, Covid testc negative,chest x-ray shows no acute 

pulmonary disease, has clearing of pulmonary edema and pleural fluid compared to

old exam,urinalyses WBC of 2,blood cultures would be sent secondary to the 

fever, consult Dr. Hernandez for neutropenic fever, unknown primary at this ti

me,start patient on cefotaxime,  Levaquin, we will obtain high resolution CT of 

the chest, CT of the abdomen fromJuly 2, 2020 was reviewed , CT June 28, 2 cm 

stellate mass right upper lobe, 4 x 2 cm hypodensity posterior spleen no 

hydronephrosis no pulmonary has seen the patient, consult with Dr. Tabitha peres





7/26: Patient is being transfused currently for hemoglobin of 6.0, along with 

platelet transfusion, both are irritated, platelet is down to 9, currently on IV

cefepime with Jovanni, Dr. Chaparro is following, CAT scan of the chest shows multiple 

pulmonary infiltrates including a spiculated lesion 16 x 10 x 13 mm right upper 

lobe, 1.5 cm noncalcified subpleural nodular density posterior segment right 

upper lobe, 7 mm noncalcified nodule right lower lobe, 2.3, dictation spiculated

left lower lobe, posterior basal segment, suspected inflammatory changes, Dr. Mckeon pulmonary has been consulted, with recommendations for follow-up CT in 3 

months, sputum culture sent today patient still has cough no hemoptysis, no 

conversational dyspnea, no active wheezing, T-max of 98.2, blood pressure is 

between 90-98 systolic pressures, nonlabored breathing pulse ox room air 98%





7/27: Patient has been seen by Dr. Mckeon with recommendations for 3 month 

follow-up for repeat CAT scan.  Patient has been continued on vancomycin, and 

cefepime by Dr. Hernandez.  We will discontinue the Levaquin that was added by 

oncology for prophylaxis.  Patient does have a temperature max of 102.6, heart 

rate 79, blood pressure 95/53, pulse ox 97% on room air.  Repeat blood work will

be ordered for today.  Patient has remained pancytopenic over the weekend.





7/28: Yesterday's blood work revealed a hemoglobin of 7, WBC 0.5, platelet count

9 and patient received 1 unit of platelets and oncology started Zarxio.  Repeat 

blood work for today reveals WBC of 0.5, hemoglobin 6.7, platelet count 23.  BUN

30 creatinine 1.25.  Blood sugar 105.  C-reactive protein is 209.5.  Patient 

denies any new complaints.  No chest pain or shortness of breath.  No cough.  

Temperature max is 102.8 at 7 PM, heart rate 72, blood pressure 88/37, pulse ox 

96% on room air. Pulmonary medicine does not plan for bronchoscopies at this 

time.  Dr. David has changed and biotics to Zosyn.





7/29: Patient is sleeping at the time of evaluation.  He wakes easily to verbal 

stimuli.  Temperature maximum 100.6, heart rate 80, blood pressure 102/52, pulse

ox 98% on room air.  Patient has had diarrhea and C. difficile toxin is 

negative.  Legionella is negative.  Lab work yesterday revealed hemoglobin of 

6.7, W BC 0.5 and platelets of 23.  He received a unit of packed RBCs last 

evening.  Repeat lab work this morning reveals WBC of 0.6, hemoglobin 6.6, 

platelet count 17.  Patient is continued on Zosyn.  He is on salt and soda 

mouthwash for oral lesions.  He has also been started on Zarxio.





7/30: Repeat blood work reveals WBC 0.7, hemoglobin 7.7, platelet count 12.  He 

is now status post 5 units of packed RBCs and 2 units of platelets.  Dr. Hernandez 

started voriconazole IV yesterday.  Patient is complaining of clear sputum 

production.  He has a history of smoking for greater than 65 years.  Oncology is

anticipating that his counts will stabilize and recover in the next several d

ays.  Once recovered, would like bronchoscopy or biopsy of spiculated lesion 

found on CT of the chest.  Temperature max 102.6.  Heart rate 72, blood pressure

100/59, pulse ox 95% on room air.





7/31: Patient is seen today in follow-up.  He denies having any chest pain or 

shortness of breath.  He is having loose stool daily.  He is eating well.  He 

denies any nausea or vomiting.  His temperature maximum 103.1, heart rate 97, 

blood pressure 118/65, pulse ox 94% on 3 L nasal cannula.  Blood culture was 

obtained last night and patient received IV Tylenol last night.  He is currently

on Zyvox added to Zosyn and Voriconazole. Beta-1,3-D-glucan and serum 

galactomannan are pendind.  Repeat blood work reveals WBC 0.5, hemoglobin 7.7, 

platelet count 11.  Potassium 3.2, BUN 26 and creatinine 1.42.  Calcium 7.5.  

Blood cultures from July 24 and July 25 are showing no growth.  Sputum culture 

finalized with contaminated oral pravin.  Stool culture is in process.





8/1: Patient continues to have fever.  Tylenol was given last night.  Additional

blood cultures were ordered yesterday.  He is complaining of diarrhea previous 

C. diff culture was negative.  Patient is complaining of a tightness to his 

abdominal area.  Blood pressure 92/42, pulse 84, respirations 20, pulse ox 92% 

on 3 L of nasal cannula.  WBC 0.6, hemoglobin 7.3, platelets 13, BUN 34, 

creatinine 2.23





Review of Systems


CONSTITUTIONAL: Thin, no acute respiratory distress.  Denies chills.  Documented

fever.


EYES: No icterus sclerae, no conjunctivitis.


EARS, NOSE, MOUTH, THROAT, and FACE: No sore throat, lymphadenopathy, carotid 

bruits or deformity.


RESPIRATORY: Positive shortness of breath cough with sputum production wheezes.


CARDIOVASCULAR: No CP, Palpitation, PND, Orthopnea, or angina.


GASTROINTESTINAL: Mild change in bowel habit with diarrhea and slight dyspepsia


GENITOURINARY: Negative for Hematuria or UTI, no kidney stones.


INTEGUMENT/BREAST: Negative for any muscular injury with mild osteoarthritis..


HEMATOLOGIC/LYMPHATIC: History of myelodysplasia severe anemia thrombocytopenia 

and neutropenia.


MUSCULOSKELTAL: Negative for Myalgia or arthralgia.


NEURLOGICAL: No LOC, Sz or syncope, blurred vision dizziness or abnormality..


BEHAVIORAL/PSYCH: Negative.


ENDOCRINE: Negative.











Objective





- Vital Signs


Vital signs: 


                                   Vital Signs











Temp  98.9 F   08/01/20 05:00


 


Pulse  80   08/01/20 09:38


 


Resp  20   08/01/20 05:00


 


BP  92/42   08/01/20 05:00


 


Pulse Ox  92 L  08/01/20 05:00








                                 Intake & Output











 07/31/20 08/01/20 08/01/20





 18:59 06:59 18:59


 


Intake Total 500 0 


 


Balance 500 0 


 


Intake:   


 


  Oral 500 0 


 


Other:   


 


  Voiding Method Urinal Urinal 


 


  # Voids 1 1 


 


  # Bowel Movements  1 














- Exam


General Appearance: Alert, cooperative, no distress, appears stated age.  

Cachectic appearing 70-year-old  male.  Patient in bed and appears to 

be in no acute distress.


Neck HEENT: Supple, no lymphadenopathy, no thyroid enlargement, no carotid 

bruits.


Lungs: Decreased breaths bilaterally.


Chest Wall: Decrease expansion with deep inspiration no tenderness and no 

deformity was found on exam, no costochondral pain.


Heart: Regular rate and rhythm, S1, S2 mild tachycardia with systolic murmur.


Back: Symmetric, no curvature, ROM normal, no CVA tenderness.


Abdomen: Tenderness to the superpubic area Soft positive bowel sound slight d

iscomfort in the mid abdominal area with mild organomegaly with multiple bruises

no rebound or rigidity.


Extremities: Extremities trace edema with multiple bruises positive mild 

ecchymosis as well.


Pulses: 2+ and symmetric.


Skin: Skin color, texture, tugor normal, no rashes or lesions.


Neurologic: Alert oriented x3 cranial nerves II through XII intact, no motor 

deficit, no abnormal balance or gait.








- Labs


CBC & Chem 7: 


                                 08/01/20 06:54





                                 08/01/20 06:54


Labs: 


                  Abnormal Lab Results - Last 24 Hours (Table)











  08/01/20 08/01/20 Range/Units





  06:54 06:54 


 


WBC  0.6 L*   (3.8-10.6)  k/uL


 


RBC  2.24 L   (4.30-5.90)  m/uL


 


Hgb  7.3 L   (13.0-17.5)  gm/dL


 


Hct  22.8 L   (39.0-53.0)  %


 


MCV  102.0 H   (80.0-100.0)  fL


 


RDW  21.8 H   (11.5-15.5)  %


 


Plt Count  13 L*   (150-450)  k/uL


 


BUN   34 H  (9-20)  mg/dL


 


Creatinine   2.23 H  (0.66-1.25)  mg/dL


 


Calcium   7.1 L  (8.4-10.2)  mg/dL


 


Magnesium   1.3 L  (1.6-2.3)  mg/dL


 


Total Protein   5.8 L  (6.3-8.2)  g/dL


 


Albumin   2.4 L  (3.5-5.0)  g/dL








                      Microbiology - Last 24 Hours (Table)











 07/25/20 17:56 Blood Culture - Final





 Blood    No Growth after 144 hours


 


 07/29/20 00:40 Stool Culture - Preliminary





 Stool    Yeast species














Assessment and Plan


Plan: 


1 acute symptomatic anemia.  Patient is status post transfusion of 5 units 

packed RBCs.  Continue Protonix. Zarxio.





2 severe myelodysplasia.  Oncology consult appreciated.  Continue Zosyn.





3 severe febrile neutropenia, possible sepsis secondary to cavitary pneumonia.  

Oncology consult, continue Zarxio.  Continue acyclovir.  Antimicrobials in the 

form of Zyvox, Zosyn and voriconazole IV.  Consult with Dr. David davis.





4.  Pancytopenia.  Patient is status post transfusion of 5 units packed RBCs and

2 unit of platelets.  Patient is followed by oncology.





5 COPD without exacerbation.  Continue albuterol nebulizer 3 times daily.  





6 chronic diastolic heart failure.  Continue furosemide at 40 mg daily.





7 recurrent pneumonia: Patient is asymptomatic at this point.  Continue inhaler 

and antibiotics.





8 multiple pulmonary infiltrates including a spiculated 16 x 10 x 30 mm 

infiltrate in the right upper lobe.  Need for biopsy at later time.





9 severe protein calorie malnutrition with BMI of 16 





10.  Acute kidney injury.  Hold Lasix.  Continue with hydration.





11.  Abdominal discomfort with diarrhea.  C. diff culture was negative.  

Abdominal CT with oral contrast only ordered





12. GI prophylaxis: pantoprazole 40 mg daily.





13. DVT prophylaxis: Patient will stay on Venodyne boots and knee-high HAKAN hose.





14. COVID-19 infection not present.





CODE STATUS: Full code.





Discharge plan: [facility] care and palliative care.





Impression and plan of care have been directed as dictated by the signing 

physician.  Jemma Pittman nurse practitioner acting as scribe for signing 

physician.

## 2020-08-01 NOTE — PN
PROGRESS NOTE



DATE OF SERVICE:

08/01/2020



REASON FOR FOLLOWUP:

Fever, febrile neutropenia and concern for pneumonia.



INTERVAL HISTORY:

The patient has been spiking a fever.  This morning had temperature 103.1.  The patient

denies having any chest pain or shortness or breath.  He did have some cough but not

bringing up any sputum.  No nausea, no vomiting.  No abdominal pain.  No diarrhea.



EXAMINATION:

Blood pressure 95/43 with a pulse of 91, temperature 98.4, T-max 103, he is 92% on room

air.

General description is an elderly male lying in bed in no distress.  Respiratory

system: Unlabored breathing.  Clear to auscultation anteriorly.  Heart S1, S2.  Regular

rate and rhythm. Abdomen soft, no tenderness.



LABS:

Hemoglobin 7.1, white count 0.6, creatinine up to 2.23.  Aspergillus antibodies came

back negative.  However _____ come back positive.



DIAGNOSTIC IMPRESSION AND PLAN:

Patient with febrile neutropenia with concern for pneumonia, question of aspergillus as

the patient has been neutropenic for a long time.  Has been on Effient in addition to

the Zosyn and Zyvox, we will have to watch his clinical course closely.  Aspergillus

IgE will be requested.  Some of the fever could be contributing from his underlying

malignancy.  This will be discussed further with Oncology.  Family was at the bedside.

They had multiple questions. Those were answered.





MMODL / IJN: [id number] / Job#: [id number]

## 2020-08-01 NOTE — PN
PROGRESS NOTE



DATE OF SERVICE:

07/31/2020



REASON FOR FOLLOWUP:

Febrile neutropenia and pneumonia.



INTERVAL HISTORY:

Patient is still spiking a fever on a daily basis of 103-101 degrees Fahrenheit.  The

patient denies having any chest pain, no shortness of breath.  He did have minimal

cough.  No sputum.  No nausea, vomiting.  No abdominal pain or any worsening diarrhea.



PHYSICAL EXAMINATION:

Blood pressure 102/47 with a pulse of 90, temperature 101.2.  He is 92% on 3 L nasal

cannula. General description is an elderly male lying in bed in no distress.

Respiratory system: Unlabored breathing, coarse breath sounds bilaterally, no wheeze.

Heart S1, S2.  Regular rate and rhythm.  Abdomen soft, no tenderness.



LABS:

Hemoglobin 7.7, white count 0.5, BUN of 26, creatinine 1.42.



DIAGNOSTIC IMPRESSION AND PLAN:

Patient with febrile neutropenia, concern for pneumonia and he did have evidence of (

) last night.  The patient is currently covered with Zosyn (  ).  We will add Zyvox for

gram-positive coccus coverage and monitor clinical course closely.





MMODL / IJN: [id number] / Job#: [id number]

## 2020-08-02 LAB
ALBUMIN SERPL-MCNC: 2.3 G/DL (ref 3.5–5)
ALP SERPL-CCNC: 49 U/L (ref 38–126)
ALT SERPL-CCNC: 24 U/L (ref 4–49)
ANION GAP SERPL CALC-SCNC: 9 MMOL/L
AST SERPL-CCNC: 40 U/L (ref 17–59)
BUN SERPL-SCNC: 43 MG/DL (ref 9–20)
CALCIUM SPEC-MCNC: 6.5 MG/DL (ref 8.4–10.2)
CHLORIDE SERPL-SCNC: 108 MMOL/L (ref 98–107)
CO2 SERPL-SCNC: 21 MMOL/L (ref 22–30)
ERYTHROCYTE [DISTWIDTH] IN BLOOD BY AUTOMATED COUNT: 2.07 M/UL (ref 4.3–5.9)
ERYTHROCYTE [DISTWIDTH] IN BLOOD: 22.3 % (ref 11.5–15.5)
GLUCOSE SERPL-MCNC: 55 MG/DL (ref 74–99)
HCT VFR BLD AUTO: 21.2 % (ref 39–53)
HGB BLD-MCNC: 6.7 GM/DL (ref 13–17.5)
MAGNESIUM SPEC-SCNC: 1.3 MG/DL (ref 1.6–2.3)
MCH RBC QN AUTO: 32.3 PG (ref 25–35)
MCHC RBC AUTO-ENTMCNC: 31.6 G/DL (ref 31–37)
MCV RBC AUTO: 102.1 FL (ref 80–100)
PLATELET # BLD AUTO: 29 K/UL (ref 150–450)
POTASSIUM SERPL-SCNC: 4.3 MMOL/L (ref 3.5–5.1)
PROT SERPL-MCNC: 5.8 G/DL (ref 6.3–8.2)
SODIUM SERPL-SCNC: 138 MMOL/L (ref 137–145)
WBC # BLD AUTO: 0.6 K/UL (ref 3.8–10.6)

## 2020-08-02 RX ADMIN — DICYCLOMINE HYDROCHLORIDE SCH MG: 20 TABLET ORAL at 21:50

## 2020-08-02 RX ADMIN — DICYCLOMINE HYDROCHLORIDE SCH MG: 20 TABLET ORAL at 12:10

## 2020-08-02 RX ADMIN — ISODIUM CHLORIDE SCH MG: 0.03 SOLUTION RESPIRATORY (INHALATION) at 19:21

## 2020-08-02 RX ADMIN — ACYCLOVIR SCH MG: 200 CAPSULE ORAL at 18:02

## 2020-08-02 RX ADMIN — ISODIUM CHLORIDE SCH MG: 0.03 SOLUTION RESPIRATORY (INHALATION) at 11:20

## 2020-08-02 RX ADMIN — FILGRASTIM-SNDZ SCH MCG: 480 INJECTION, SOLUTION INTRAVENOUS; SUBCUTANEOUS at 07:09

## 2020-08-02 RX ADMIN — DICYCLOMINE HYDROCHLORIDE SCH MG: 20 TABLET ORAL at 07:09

## 2020-08-02 RX ADMIN — VORICONAZOLE SCH MG: 200 TABLET, FILM COATED ORAL at 21:50

## 2020-08-02 RX ADMIN — ISODIUM CHLORIDE SCH: 0.03 SOLUTION RESPIRATORY (INHALATION) at 07:30

## 2020-08-02 RX ADMIN — ACYCLOVIR SCH MG: 200 CAPSULE ORAL at 07:09

## 2020-08-02 RX ADMIN — LOPERAMIDE HYDROCHLORIDE SCH MG: 2 CAPSULE ORAL at 21:50

## 2020-08-02 RX ADMIN — NYSTATIN SCH UNIT: 100000 SUSPENSION ORAL at 21:50

## 2020-08-02 RX ADMIN — Medication SCH ML: at 20:12

## 2020-08-02 RX ADMIN — Medication SCH ML: at 06:09

## 2020-08-02 RX ADMIN — LINEZOLID SCH MG: 600 TABLET, FILM COATED ORAL at 21:49

## 2020-08-02 RX ADMIN — ACETAMINOPHEN PRN MG: 325 TABLET, FILM COATED ORAL at 18:02

## 2020-08-02 RX ADMIN — DICYCLOMINE HYDROCHLORIDE SCH MG: 20 TABLET ORAL at 18:03

## 2020-08-02 RX ADMIN — THERA TABS SCH EACH: TAB at 07:10

## 2020-08-02 RX ADMIN — CHOLESTYRAMINE SCH GM: 4 POWDER, FOR SUSPENSION ORAL at 12:10

## 2020-08-02 RX ADMIN — LINEZOLID SCH MG: 600 TABLET, FILM COATED ORAL at 07:09

## 2020-08-02 RX ADMIN — POTASSIUM CHLORIDE SCH MLS/HR: 14.9 INJECTION, SOLUTION INTRAVENOUS at 14:53

## 2020-08-02 RX ADMIN — LOPERAMIDE HYDROCHLORIDE SCH MG: 2 CAPSULE ORAL at 12:10

## 2020-08-02 RX ADMIN — Medication SCH ML: at 12:02

## 2020-08-02 RX ADMIN — LOPERAMIDE HYDROCHLORIDE SCH: 2 CAPSULE ORAL at 14:07

## 2020-08-02 RX ADMIN — NYSTATIN SCH UNIT: 100000 SUSPENSION ORAL at 18:02

## 2020-08-02 RX ADMIN — CHOLESTYRAMINE SCH GM: 4 POWDER, FOR SUSPENSION ORAL at 18:03

## 2020-08-02 RX ADMIN — CEFAZOLIN SCH: 330 INJECTION, POWDER, FOR SOLUTION INTRAMUSCULAR; INTRAVENOUS at 21:56

## 2020-08-02 RX ADMIN — PANTOPRAZOLE SODIUM SCH MG: 40 TABLET, DELAYED RELEASE ORAL at 07:13

## 2020-08-02 RX ADMIN — ACETAMINOPHEN PRN MG: 325 TABLET, FILM COATED ORAL at 07:37

## 2020-08-02 RX ADMIN — PIPERACILLIN AND TAZOBACTAM SCH MLS/HR: 3; .375 INJECTION, POWDER, FOR SOLUTION INTRAVENOUS at 07:06

## 2020-08-02 RX ADMIN — PIPERACILLIN AND TAZOBACTAM SCH MLS/HR: 3; .375 INJECTION, POWDER, FOR SOLUTION INTRAVENOUS at 21:50

## 2020-08-02 RX ADMIN — Medication SCH ML: at 18:03

## 2020-08-02 RX ADMIN — VORICONAZOLE SCH MG: 200 TABLET, FILM COATED ORAL at 07:10

## 2020-08-02 RX ADMIN — NYSTATIN SCH UNIT: 100000 SUSPENSION ORAL at 14:09

## 2020-08-02 RX ADMIN — POTASSIUM CHLORIDE SCH MEQ: 20 TABLET, EXTENDED RELEASE ORAL at 07:10

## 2020-08-02 RX ADMIN — NYSTATIN SCH UNIT: 100000 SUSPENSION ORAL at 07:10

## 2020-08-02 RX ADMIN — LOPERAMIDE HYDROCHLORIDE SCH MG: 2 CAPSULE ORAL at 18:02

## 2020-08-02 RX ADMIN — CEFAZOLIN SCH MLS/HR: 330 INJECTION, POWDER, FOR SOLUTION INTRAMUSCULAR; INTRAVENOUS at 07:08

## 2020-08-02 RX ADMIN — ACYCLOVIR SCH MG: 200 CAPSULE ORAL at 21:50

## 2020-08-02 NOTE — PN
PROGRESS NOTE



DATE OF SERVICE:

08/02/2020



REASON FOR FOLLOWUP:

Febrile neutropenia and pneumonia.



INTERVAL HISTORY:

The patient is currently afebrile.  Last temperature was around 10 o'clock last night.

The patient is complaining of some shortness of breath today.  He did have a cough but

not bringing up any sputum. No chest pain.  No nausea. No vomiting. No abdominal pain

or any worsening diarrhea.



PHYSICAL EXAMINATION:

Blood pressure is 95/58 with a pulse of 83, temperature 98.9. He is 99% on 2 L nasal

cannula.

General description is an elderly male lying in bed in no distress.

RESPIRATORY SYSTEM: Unlabored breathing, decreased breath sounds at bases.  No wheeze.

HEART: S1, S2.  Regular rate and rhythm.

ABDOMEN:  Soft, no tenderness.



LABS:

Hemoglobin is 6.7, white count 0.6. Platelet count is 29 with a BUN of 43, creatinine

is 2.88.  CRP is elevated.



DIAGNOSTIC IMPRESSION AND PLAN:

Patient with febrile neutropenia with concern for pneumonia cavitary on the left lower

lobe with concern for possible Aspergillus versus resistant gram-positive, gram-

negative.  Patient is covered with Vfend, Zyvox and Zosyn to continue and will monitor

clinical course closely.





MMODL / IJN: [id number] / Job#: [id number]

## 2020-08-02 NOTE — P.PN
Subjective


Progress Note Date: 08/02/20


70-year-old male one of Dr. Luu's patient with past medical history of 

anemia, gout, tobacco use, daily alcohol use and myelodysplasia who has been set

pancytopenic for the last 2 month was started on chemotherapy with hematology 

recently was hospitalized for extended period of time for severe pancytopenia 

and severe neutropenia did not recover.  Patient apparently went back on 

chemotherapy after he left the hospital last time.  Today found to have severe 

anemia with hemoglobin of 5.1 with severe neutropenia white blood cell 1.3 and 

thrombocytopenia with platelets 28,000 only.  Patient brought to the [facility] emergency department where was seen and evaluated has been 

very weak has not been able to ambulate and walk and has been having worsening 

shortness of breath with dyspnea with minimal exertion for the last few days.  

With a current hemoglobin his oncologist and instructed him to come to the 

hospital for admission for transfusion.





7/24: Patient is seen today on the [facility] floor.  He is status post 2 units of 

packed RBCs and repeat lab work reveals hemoglobin of 7.3, WBC 0.9, platelet 

count 18.  Electrolytes normal, BUN 32 and creatinine 0.89.  Calcium 7.9, total 

bilirubin 1.5, AST 42, ALT 40, alkaline phosphatase 66.  Temperature max 100.0, 

heart rate 76, blood pressure 115/66, pulse ox 96% on room air.  Consult in 

place for oncology.  COVID-19 is pending.





7/25: Hemoglobin currently is at 7.1, platelet count of 16, double basic count 

of 0.7 oncology thinks that he has myelodysplasia,patient wants to stay another 

day just in case he needs another blood products, as it's difficult for him to 

be transported back and forth with the wife who has been on a wheelchair, 

patient has chronic cough from smoking, denies any feverhowever T-max is 100.0 

within the past 24 hours, Covid testc negative,chest x-ray shows no acute 

pulmonary disease, has clearing of pulmonary edema and pleural fluid compared to

old exam,urinalyses WBC of 2,blood cultures would be sent secondary to the 

fever, consult Dr. Hernandez for neutropenic fever, unknown primary at this ti

me,start patient on cefotaxime,  Levaquin, we will obtain high resolution CT of 

the chest, CT of the abdomen fromJuly 2, 2020 was reviewed , CT June 28, 2 cm 

stellate mass right upper lobe, 4 x 2 cm hypodensity posterior spleen no 

hydronephrosis no pulmonary has seen the patient, consult with Dr. Tabitha peres





7/26: Patient is being transfused currently for hemoglobin of 6.0, along with 

platelet transfusion, both are irritated, platelet is down to 9, currently on IV

cefepime with Jovanni, Dr. Chaparro is following, CAT scan of the chest shows multiple 

pulmonary infiltrates including a spiculated lesion 16 x 10 x 13 mm right upper 

lobe, 1.5 cm noncalcified subpleural nodular density posterior segment right 

upper lobe, 7 mm noncalcified nodule right lower lobe, 2.3, dictation spiculated

left lower lobe, posterior basal segment, suspected inflammatory changes, Dr. Mckeon pulmonary has been consulted, with recommendations for follow-up CT in 3 

months, sputum culture sent today patient still has cough no hemoptysis, no 

conversational dyspnea, no active wheezing, T-max of 98.2, blood pressure is 

between 90-98 systolic pressures, nonlabored breathing pulse ox room air 98%





7/27: Patient has been seen by Dr. Mckeon with recommendations for 3 month 

follow-up for repeat CAT scan.  Patient has been continued on vancomycin, and 

cefepime by Dr. Hernandez.  We will discontinue the Levaquin that was added by 

oncology for prophylaxis.  Patient does have a temperature max of 102.6, heart 

rate 79, blood pressure 95/53, pulse ox 97% on room air.  Repeat blood work will

be ordered for today.  Patient has remained pancytopenic over the weekend.





7/28: Yesterday's blood work revealed a hemoglobin of 7, WBC 0.5, platelet count

9 and patient received 1 unit of platelets and oncology started Zarxio.  Repeat 

blood work for today reveals WBC of 0.5, hemoglobin 6.7, platelet count 23.  BUN

30 creatinine 1.25.  Blood sugar 105.  C-reactive protein is 209.5.  Patient 

denies any new complaints.  No chest pain or shortness of breath.  No cough.  

Temperature max is 102.8 at 7 PM, heart rate 72, blood pressure 88/37, pulse ox 

96% on room air. Pulmonary medicine does not plan for bronchoscopies at this 

time.  Dr. David has changed and biotics to Zosyn.





7/29: Patient is sleeping at the time of evaluation.  He wakes easily to verbal 

stimuli.  Temperature maximum 100.6, heart rate 80, blood pressure 102/52, pulse

ox 98% on room air.  Patient has had diarrhea and C. difficile toxin is 

negative.  Legionella is negative.  Lab work yesterday revealed hemoglobin of 

6.7, W BC 0.5 and platelets of 23.  He received a unit of packed RBCs last 

evening.  Repeat lab work this morning reveals WBC of 0.6, hemoglobin 6.6, 

platelet count 17.  Patient is continued on Zosyn.  He is on salt and soda 

mouthwash for oral lesions.  He has also been started on Zarxio.





7/30: Repeat blood work reveals WBC 0.7, hemoglobin 7.7, platelet count 12.  He 

is now status post 5 units of packed RBCs and 2 units of platelets.  Dr. Hernanedz 

started voriconazole IV yesterday.  Patient is complaining of clear sputum 

production.  He has a history of smoking for greater than 65 years.  Oncology is

anticipating that his counts will stabilize and recover in the next several d

ays.  Once recovered, would like bronchoscopy or biopsy of spiculated lesion 

found on CT of the chest.  Temperature max 102.6.  Heart rate 72, blood pressure

100/59, pulse ox 95% on room air.





7/31: Patient is seen today in follow-up.  He denies having any chest pain or 

shortness of breath.  He is having loose stool daily.  He is eating well.  He 

denies any nausea or vomiting.  His temperature maximum 103.1, heart rate 97, 

blood pressure 118/65, pulse ox 94% on 3 L nasal cannula.  Blood culture was 

obtained last night and patient received IV Tylenol last night.  He is currently

on Zyvox added to Zosyn and Voriconazole. Beta-1,3-D-glucan and serum 

galactomannan are pendind.  Repeat blood work reveals WBC 0.5, hemoglobin 7.7, 

platelet count 11.  Potassium 3.2, BUN 26 and creatinine 1.42.  Calcium 7.5.  

Blood cultures from July 24 and July 25 are showing no growth.  Sputum culture 

finalized with contaminated oral pravin.  Stool culture is in process.





8/1: Patient continues to have fever.  Tylenol was given last night.  Additional

blood cultures were ordered yesterday.  He is complaining of diarrhea previous 

C. diff culture was negative.  Patient is complaining of a tightness to his 

abdominal area.  Blood pressure 92/42, pulse 84, respirations 20, pulse ox 92% 

on 3 L of nasal cannula.  WBC 0.6, hemoglobin 7.3, platelets 13, BUN 34, 

creatinine 2.23





8/2: Patient has multiple bouts of watery yellow diarrhea.  Patient has been up 

most the day and night due to the diarrhea.  Previous C. diff was negative.  

Patient continues to complain of tenderness to his abdominal area.  WCC 0.6, 

Hemoglobin 6.7, platelets 29, BUN 43, creatinine 2.88





Review of Systems


CONSTITUTIONAL: Thin, no acute respiratory distress.  Denies chills.  Documented

fever.


EYES: No icterus sclerae, no conjunctivitis.


EARS, NOSE, MOUTH, THROAT, and FACE: No sore throat, lymphadenopathy, carotid 

bruits or deformity.


RESPIRATORY: Positive shortness of breath cough with sputum production wheezes.


CARDIOVASCULAR: No CP, Palpitation, PND, Orthopnea, or angina.


GASTROINTESTINAL: Abdominal discomfort with diarrhea and slight dyspepsia


GENITOURINARY: Negative for Hematuria or UTI, no kidney stones.


INTEGUMENT/BREAST: Negative for any muscular injury with mild osteoarthritis..


HEMATOLOGIC/LYMPHATIC: History of myelodysplasia severe anemia thrombocytopenia 

and neutropenia.


MUSCULOSKELTAL: Negative for Myalgia or arthralgia.


NEURLOGICAL: No LOC, Sz or syncope, blurred vision dizziness or abnormality..


BEHAVIORAL/PSYCH: Negative.


ENDOCRINE: Negative.











Objective





- Vital Signs


Vital signs: 


                                   Vital Signs











Temp  99.3 F   08/02/20 05:00


 


Pulse  94   08/02/20 05:00


 


Resp  18   08/02/20 05:00


 


BP  99/61   08/02/20 05:00


 


Pulse Ox  97   08/02/20 05:00








                                 Intake & Output











 08/01/20 08/02/20 08/02/20





 18:59 06:59 18:59


 


Intake Total 4860 900 


 


Output Total 400  200


 


Balance 4460 900 -200


 


Intake:   


 


  Intake, IV Titration 1300 900 





  Amount   


 


    Linezolid 600 mg In 300  





    Dextrose/Water 1 300ml.   





    bag @ 150 mls/hr IVPB   





    Q12HR [facility] Rx#:[id number]   


 


    Piperacillin-Tazobactam 3 100  





    .375 gm In Sodium   





    Chloride 0.9% 100 ml @ 25   





    mls/hr IVPB Q8HR [facility] Rx#   





    :[id number]   


 


    Sodium Chloride 0.9% 1, 900 900 





    000 ml @ 75 mls/hr IV .   





    [id number] [facility] Rx#:[id number]   


 


  Oral 3560  


 


Output:   


 


  Urine 400  200


 


Other:   


 


  Voiding Method Urinal Bedside Commode 





  Urinal 


 


  # Voids 4 1 1


 


  # Bowel Movements  3 3














- Exam


General Appearance: Alert, cooperative, no distress, appears stated age.  

Cachectic appearing 70-year-old  male.  Patient in bed and appears to 

be in no acute distress.


Neck HEENT: Supple, no lymphadenopathy, no thyroid enlargement, no carotid 

bruits.


Lungs: Decreased breaths bilaterally.


Chest Wall: Decrease expansion with deep inspiration no tenderness and no 

deformity was found on exam, no costochondral pain.


Heart: Regular rate and rhythm, S1, S2 mild tachycardia with systolic murmur.


Back: Symmetric, no curvature, ROM normal, no CVA tenderness.


Abdomen: Tenderness to the superpubic area Soft positive bowel sound slight di

scomfort in the mid abdominal area with mild organomegaly with multiple bruises 

no rebound or rigidity.


Extremities: Extremities trace edema with multiple bruises positive mild 

ecchymosis as well.


Pulses: 2+ and symmetric.


Skin: Skin color, texture, tugor normal, no rashes or lesions.


Neurologic: Alert oriented x3 cranial nerves II through XII intact, no motor 

deficit, no abnormal balance or gait.








- Labs


CBC & Chem 7: 


                                 08/02/20 07:12





                                 08/02/20 07:12


Labs: 


                  Abnormal Lab Results - Last 24 Hours (Table)











  08/02/20 08/02/20 Range/Units





  07:12 07:12 


 


WBC  0.6 L*   (3.8-10.6)  k/uL


 


RBC  2.07 L   (4.30-5.90)  m/uL


 


Hgb  6.7 L*   (13.0-17.5)  gm/dL


 


Hct  21.2 L   (39.0-53.0)  %


 


MCV  102.1 H   (80.0-100.0)  fL


 


RDW  22.3 H   (11.5-15.5)  %


 


Plt Count  29 L D   (150-450)  k/uL


 


Chloride   108 H  ()  mmol/L


 


Carbon Dioxide   21 L  (22-30)  mmol/L


 


BUN   43 H  (9-20)  mg/dL


 


Creatinine   2.88 H  (0.66-1.25)  mg/dL


 


Glucose   55 L  (74-99)  mg/dL


 


Calcium   6.5 L  (8.4-10.2)  mg/dL


 


Magnesium   1.3 L  (1.6-2.3)  mg/dL


 


C-Reactive Protein   559.2 H  (<10.0)  mg/L


 


Total Protein   5.8 L  (6.3-8.2)  g/dL


 


Albumin   2.3 L  (3.5-5.0)  g/dL








                      Microbiology - Last 24 Hours (Table)











 07/29/20 00:40 Stool Culture - Final





 Stool    Yeast species


 


 07/31/20 07:41 Blood Culture - Preliminary





 Blood    No Growth after 24 hours


 


 07/31/20 07:41 Blood Culture - Preliminary





 Blood    No Growth after 24 hours














Assessment and Plan


Plan: 


1 acute symptomatic anemia.  Patient is status post transfusion of 5 units 

packed RBCs.  Continue Protonix. Zarxio.





2 severe myelodysplasia.  Oncology consult appreciated.  Continue Zosyn.





3 severe febrile neutropenia, possible sepsis secondary to cavitary pneumonia.  

Oncology consult, continue Zarxio.  Continue acyclovir.  Antimicrobials in the 

form of Zyvox, Zosyn and voriconazole IV.  Consult with Dr. Hernandez appreciated.





4.  Pancytopenia.  Hemoglobin 6.7 transfused 1 unit of packed red blood cells 

today Patient is status post transfusion of 5 units packed RBCs and 2 unit of 

platelets.  Patient is followed by oncology.





5 COPD without exacerbation.  Continue albuterol nebulizer 3 times daily.  





6 chronic diastolic heart failure.  Continue furosemide at 40 mg daily.





7 recurrent pneumonia: Patient is asymptomatic at this point.  Continue inhaler 

and antibiotics.





8 multiple pulmonary infiltrates including a spiculated 16 x 10 x 30 mm 

infiltrate in the right upper lobe.  Need for biopsy at later time.





9 severe protein calorie malnutrition with BMI of 16 





10.  Acute kidney injury.  Hold Lasix.  Continue with hydration.





11.  Abdominal discomfort with diarrhea.  C. diff culture was negative.  

Abdominal CT with oral contrast only ordered





12. GI prophylaxis: pantoprazole 40 mg daily.





13. DVT prophylaxis: Patient will stay on Venodyne boots and knee-high HAKAN hose.





14. COVID-19 infection not present.





15.  Diarrhea.  Repeat C. diff, Imodium and Questran ordered.





CODE STATUS: Full code.





Discharge plan: [facility] home care and palliative care.





Impression and plan of care have been directed as dictated by the signing 

physician.  Jemma Pittman nurse practitioner acting as scribe for signing 

physician.

## 2020-08-02 NOTE — P.PN
Subjective


Progress Note Date: 08/02/20


Principal diagnosis: 





Pancytopenia status post treatment of MDS





In follow-up today patient was confused this morning, he had a fever of 101.5 

Fahrenheit, he still a little bit confused when speaking to him but, he states 

that his oral irritation is the same, he denies nausea or vomiting, abdominal 

pain or cramping, progressive diarrhea, bleeding, he has a cough but, is not 

able to expectorate, he is getting progressively weaker.  





Objective





- Vital Signs


Vital signs: 


                                   Vital Signs











Temp  99.3 F   08/02/20 05:00


 


Pulse  94   08/02/20 05:00


 


Resp  18   08/02/20 05:00


 


BP  99/61   08/02/20 05:00


 


Pulse Ox  97   08/02/20 05:00








                                 Intake & Output











 08/01/20 08/02/20 08/02/20





 18:59 06:59 18:59


 


Intake Total 4860 900 


 


Output Total 400  200


 


Balance 4460 900 -200


 


Intake:   


 


  Intake, IV Titration 1300 900 





  Amount   


 


    Linezolid 600 mg In 300  





    Dextrose/Water 1 300ml.   





    bag @ 150 mls/hr IVPB   





    Q12HR [facility] Rx#:[id number]   


 


    Piperacillin-Tazobactam 3 100  





    .375 gm In Sodium   





    Chloride 0.9% 100 ml @ 25   





    mls/hr IVPB Q8HR [facility] Rx#   





    :[id number]   


 


    Sodium Chloride 0.9% 1, 900 900 





    000 ml @ 75 mls/hr IV .   





    [id number] [facility] Rx#:[id number]   


 


  Oral 3560  


 


Output:   


 


  Urine 400  200


 


Other:   


 


  Voiding Method Urinal Bedside Commode 





  Urinal 


 


  # Voids 4 1 1


 


  # Bowel Movements  3 3














- Constitutional


General appearance: Present: cooperative, no acute distress, thin





- EENT


Eyes: Present: anicteric sclerae, EOMI


ENT: Present: hearing grossly normal, normal oropharynx





- Respiratory


Respiratory: bilateral: diminished





- Cardiovascular


Rhythm: regular


Heart sounds: normal: S1, S2


Abnormal Heart Sounds: Absent: systolic murmur, diastolic murmur, rub, S3 

Gallop, S4 Gallop, click, other





- Peripheral edema


  ** leg


Peripheral Edema: bilateral: None





- Gastrointestinal


General gastrointestinal: Present: normal bowel sounds, soft.  Absent: absent 

bowel sounds, decreased bowel sounds, distended, hepatomegaly, hyperactive bowel

sounds, organomegaly, rigid, scaphoid, splenomegaly, tenderness, umbilical 

hernia, ventral hernia





- Musculoskeletal


Musculoskeletal: Present: generalized weakness





- Psychiatric


Psychiatric: Present: A&O x's 3, appropriate affect





- Labs


CBC & Chem 7: 


                                 08/02/20 07:12





                                 08/02/20 07:12


Labs: 


                  Abnormal Lab Results - Last 24 Hours (Table)











  08/02/20 08/02/20 Range/Units





  07:12 07:12 


 


WBC  0.6 L*   (3.8-10.6)  k/uL


 


RBC  2.07 L   (4.30-5.90)  m/uL


 


Hgb  6.7 L*   (13.0-17.5)  gm/dL


 


Hct  21.2 L   (39.0-53.0)  %


 


MCV  102.1 H   (80.0-100.0)  fL


 


RDW  22.3 H   (11.5-15.5)  %


 


Plt Count  29 L D   (150-450)  k/uL


 


Chloride   108 H  ()  mmol/L


 


Carbon Dioxide   21 L  (22-30)  mmol/L


 


BUN   43 H  (9-20)  mg/dL


 


Creatinine   2.88 H  (0.66-1.25)  mg/dL


 


Glucose   55 L  (74-99)  mg/dL


 


Calcium   6.5 L  (8.4-10.2)  mg/dL


 


Magnesium   1.3 L  (1.6-2.3)  mg/dL


 


C-Reactive Protein   559.2 H  (<10.0)  mg/L


 


Total Protein   5.8 L  (6.3-8.2)  g/dL


 


Albumin   2.3 L  (3.5-5.0)  g/dL








                      Microbiology - Last 24 Hours (Table)











 07/31/20 07:41 Blood Culture - Preliminary





 Blood    No Growth after 48 hours


 


 07/31/20 07:41 Blood Culture - Preliminary





 Blood    No Growth after 48 hours


 


 07/29/20 00:40 Stool Culture - Final





 Stool    Yeast species














Assessment and Plan


(1) Febrile neutropenia


Narrative/Plan: 


Patient continues to have fevers, he is also becoming delirious during his 

febrile episodes.  He continues on G-CSF right now, his WBC is stable today.  It

is been labile in the past on G-CSF.  This time Dr. Dos Santos would like him to 

continue on growth factor.  All cultures negative, several of been repeated with

no underlying cause other than a suspicion for a yeast infection.  We'll 

continue to work closely with ID and Pulmonary regarding the abnormal findings 

on the CT of the chest as counts are not adequate to facilitate a biopsy


Current Visit: Yes   Status: Acute   Priority: High   Code(s): D70.9 - NEUT

ROPENIA, UNSPECIFIED; R50.81 - FEVER PRESENTING WITH CONDITIONS CLASSIFIED 

ELSEWHERE   SNOMED Code(s): 352863885


   





(2) Pancytopenia due to antineoplastic chemotherapy


Narrative/Plan: 


Transfuse for hemoglobin less than 7 or symptomatic.  Hemoglobin 6.7.  1 unit 

PRBCs today.





Transfuse for platelets less than 10,000 unless symptomatic.  No transfusion 

today, platelets 29,000





Patient did receive G-CSF after treatment.  That was on the 18th.  Patient 

started short acting G-CSF 7/28.  WBC 0.6


Current Visit: Yes   Status: Chronic   Priority: Medium   Code(s): D61.810 - 

ANTINEOPLASTIC CHEMOTHERAPY INDUCED PANCYTOPENIA; T45.1X5A - ADVERSE EFFECT OF 

ANTINEOPLASTIC AND IMMUNOSUP DRUGS, INIT   SNOMED Code(s): 189122340655371


   





(3) MDS (myelodysplastic syndrome), high grade


Narrative/Plan: 


Patient is now status post 2 of the recommended 3 treatments suggested by He

matology to see if able to get some control of his disease.  Pt is now past 

sebastian.  He should be recovering.  We will reevaluate the patient in the a.m. 

further recommendations to follow





Current Visit: No   Status: Chronic   Priority: Medium   Code(s): D46.Z - OTHER 

MYELODYSPLASTIC SYNDROMES   SNOMED Code(s): 986012005


   





(4) Thrush, oral


Current Visit: Yes   Status: Resolved   Priority: Medium   Code(s): B37.0 - 

CANDIDAL STOMATITIS   SNOMED Code(s): 07315124


   


Plan: 





ID reviewed computed tomography scan.  May consider bronchoscopy versus 

percutaneous biopsy to see what is going on in the patient's lung once WBC/ANC 

and platelets stable.  Pending recommendations.

## 2020-08-03 ENCOUNTER — HOSPITAL ENCOUNTER (INPATIENT)
Dept: HOSPITAL 47 - 5NMEDONC | Age: 70
DRG: 951 | End: 2020-08-03
Attending: INTERNAL MEDICINE | Admitting: INTERNAL MEDICINE
Payer: MEDICAID

## 2020-08-03 VITALS — TEMPERATURE: 101.1 F

## 2020-08-03 VITALS — HEART RATE: 127 BPM | SYSTOLIC BLOOD PRESSURE: 81 MMHG | DIASTOLIC BLOOD PRESSURE: 51 MMHG | RESPIRATION RATE: 33 BRPM

## 2020-08-03 DIAGNOSIS — D46.1: ICD-10-CM

## 2020-08-03 DIAGNOSIS — Z66: ICD-10-CM

## 2020-08-03 DIAGNOSIS — Z87.2: ICD-10-CM

## 2020-08-03 DIAGNOSIS — Z79.899: ICD-10-CM

## 2020-08-03 DIAGNOSIS — D61.810: ICD-10-CM

## 2020-08-03 DIAGNOSIS — F17.210: ICD-10-CM

## 2020-08-03 DIAGNOSIS — Z80.9: ICD-10-CM

## 2020-08-03 DIAGNOSIS — Z87.01: ICD-10-CM

## 2020-08-03 DIAGNOSIS — Z51.5: Primary | ICD-10-CM

## 2020-08-03 DIAGNOSIS — Z80.0: ICD-10-CM

## 2020-08-03 DIAGNOSIS — J44.9: ICD-10-CM

## 2020-08-03 DIAGNOSIS — Z80.49: ICD-10-CM

## 2020-08-03 DIAGNOSIS — T45.1X5A: ICD-10-CM

## 2020-08-03 DIAGNOSIS — Z72.89: ICD-10-CM

## 2020-08-03 LAB
ALBUMIN SERPL-MCNC: 2.3 G/DL (ref 3.5–5)
ALP SERPL-CCNC: 52 U/L (ref 38–126)
ALT SERPL-CCNC: 25 U/L (ref 4–49)
ANION GAP SERPL CALC-SCNC: 9 MMOL/L
AST SERPL-CCNC: 49 U/L (ref 17–59)
BUN SERPL-SCNC: 53 MG/DL (ref 9–20)
CALCIUM SPEC-MCNC: 6.3 MG/DL (ref 8.4–10.2)
CHLORIDE SERPL-SCNC: 113 MMOL/L (ref 98–107)
CO2 SERPL-SCNC: 17 MMOL/L (ref 22–30)
ERYTHROCYTE [DISTWIDTH] IN BLOOD BY AUTOMATED COUNT: 2.42 M/UL (ref 4.3–5.9)
ERYTHROCYTE [DISTWIDTH] IN BLOOD: 22.1 % (ref 11.5–15.5)
GLUCOSE BLD-MCNC: 83 MG/DL (ref 75–99)
GLUCOSE SERPL-MCNC: 72 MG/DL (ref 74–99)
HCT VFR BLD AUTO: 25.2 % (ref 39–53)
HGB BLD-MCNC: 7.8 GM/DL (ref 13–17.5)
MAGNESIUM SPEC-SCNC: 1.3 MG/DL (ref 1.6–2.3)
MCH RBC QN AUTO: 32.4 PG (ref 25–35)
MCHC RBC AUTO-ENTMCNC: 31.1 G/DL (ref 31–37)
MCV RBC AUTO: 104.1 FL (ref 80–100)
PLATELET # BLD AUTO: 102 K/UL (ref 150–450)
POTASSIUM SERPL-SCNC: 4.3 MMOL/L (ref 3.5–5.1)
PROT SERPL-MCNC: 6 G/DL (ref 6.3–8.2)
SODIUM SERPL-SCNC: 139 MMOL/L (ref 137–145)
WBC # BLD AUTO: 0.6 K/UL (ref 3.8–10.6)

## 2020-08-03 RX ADMIN — PIPERACILLIN AND TAZOBACTAM SCH MLS/HR: 3; .375 INJECTION, POWDER, FOR SOLUTION INTRAVENOUS at 07:09

## 2020-08-03 RX ADMIN — POTASSIUM CHLORIDE SCH: 14.9 INJECTION, SOLUTION INTRAVENOUS at 07:43

## 2020-08-03 RX ADMIN — ISODIUM CHLORIDE SCH MG: 0.03 SOLUTION RESPIRATORY (INHALATION) at 07:48

## 2020-08-03 RX ADMIN — VORICONAZOLE SCH MG: 200 TABLET, FILM COATED ORAL at 07:14

## 2020-08-03 RX ADMIN — ATROPINE SULFATE PRN DROPS: 10 SOLUTION/ DROPS OPHTHALMIC at 13:00

## 2020-08-03 RX ADMIN — ATROPINE SULFATE PRN DROPS: 10 SOLUTION/ DROPS OPHTHALMIC at 15:57

## 2020-08-03 RX ADMIN — Medication SCH: at 12:53

## 2020-08-03 RX ADMIN — NYSTATIN SCH: 100000 SUSPENSION ORAL at 12:52

## 2020-08-03 RX ADMIN — POTASSIUM CHLORIDE SCH MEQ: 20 TABLET, EXTENDED RELEASE ORAL at 07:13

## 2020-08-03 RX ADMIN — LOPERAMIDE HYDROCHLORIDE SCH MG: 2 CAPSULE ORAL at 07:13

## 2020-08-03 RX ADMIN — Medication SCH ML: at 05:53

## 2020-08-03 RX ADMIN — DICYCLOMINE HYDROCHLORIDE SCH: 20 TABLET ORAL at 12:52

## 2020-08-03 RX ADMIN — THERA TABS SCH EACH: TAB at 07:13

## 2020-08-03 RX ADMIN — CEFAZOLIN SCH: 330 INJECTION, POWDER, FOR SOLUTION INTRAMUSCULAR; INTRAVENOUS at 07:43

## 2020-08-03 RX ADMIN — DICYCLOMINE HYDROCHLORIDE SCH MG: 20 TABLET ORAL at 07:13

## 2020-08-03 RX ADMIN — LINEZOLID SCH MG: 600 TABLET, FILM COATED ORAL at 07:13

## 2020-08-03 RX ADMIN — LOPERAMIDE HYDROCHLORIDE SCH: 2 CAPSULE ORAL at 12:52

## 2020-08-03 RX ADMIN — Medication SCH ML: at 07:14

## 2020-08-03 RX ADMIN — NYSTATIN SCH UNIT: 100000 SUSPENSION ORAL at 07:13

## 2020-08-03 RX ADMIN — Medication SCH: at 01:21

## 2020-08-03 RX ADMIN — POTASSIUM CHLORIDE SCH: 14.9 INJECTION, SOLUTION INTRAVENOUS at 01:21

## 2020-08-03 RX ADMIN — ISODIUM CHLORIDE SCH: 0.03 SOLUTION RESPIRATORY (INHALATION) at 12:33

## 2020-08-03 RX ADMIN — ACYCLOVIR SCH MG: 200 CAPSULE ORAL at 07:13

## 2020-08-03 RX ADMIN — CHOLESTYRAMINE SCH GM: 4 POWDER, FOR SUSPENSION ORAL at 07:14

## 2020-08-03 RX ADMIN — CHOLESTYRAMINE SCH: 4 POWDER, FOR SUSPENSION ORAL at 12:52

## 2020-08-03 RX ADMIN — PANTOPRAZOLE SODIUM SCH MG: 40 TABLET, DELAYED RELEASE ORAL at 07:13

## 2020-08-03 RX ADMIN — FILGRASTIM-SNDZ SCH MCG: 480 INJECTION, SOLUTION INTRAVENOUS; SUBCUTANEOUS at 07:09

## 2020-08-03 NOTE — PN
PROGRESS NOTE



DATE OF SERVICE:

08/03/2020



REASON FOR FOLLOWUP:

Febrile neutropenia pneumonia.



INTERVAL HISTORY:

Patient did have a low fever of 100.8 this morning.  The patient afebrile since then.

The patient did have worsening of his respiratory status, is currently lethargic,

tachypneic.  No vomiting or diarrhea or any other changes reported by the nursing

staff.



PHYSICAL EXAMINATION:

Blood pressure 124/60 with a pulse of 112, temperature 98.8.  He is 93% on 3 L nasal

cannula.

General description is an elderly male, lying in bed in no distress.

RESPIRATORY SYSTEM: Unlabored breathing, coarse breath sounds bilaterally, no wheeze.

HEART:  S1, S2.  Regular rate and rhythm.

ABDOMEN:  Soft, no tenderness.



LABS:

Hemoglobin 7.1, white count 0.6, BUN of 53, creatinine up to 3.55.



DIAGNOSTIC IMPRESSION AND PLAN:

Patient with febrile neutropenia with a component of pneumonia, can be treated with

concern for possible Aspergillus.  Patient is covered with _____and also covered with

Zosyn and Zyvox.  The patient now does have worsening of his kidney function as well as

respiratory status.  Admitting team has talked to the family and they are leaning more

towards hospice or comfort care, which is the case, recommend to discontinue antibiotic

and discussed with the nurse.





MMODL / IJN: [id number] / Job#: [id number]

## 2020-08-03 NOTE — P.PN
Subjective


Progress Note Date: 08/03/20








70-year-old male one of Dr. Luu's patient with past medical history of 

anemia, gout, tobacco use, daily alcohol use and myelodysplasia who has been set

pancytopenic for the last 2 month was started on chemotherapy with hematology 

recently was hospitalized for extended period of time for severe pancytopenia 

and severe neutropenia did not recover.  Patient apparently went back on 

chemotherapy after he left the hospital last time.  Today found to have severe 

anemia with hemoglobin of 5.1 with severe neutropenia white blood cell 1.3 and 

thrombocytopenia with platelets 28,000 only.  Patient brought to the [facility] emergency department where was seen and evaluated has been 

very weak has not been able to ambulate and walk and has been having worsening 

shortness of breath with dyspnea with minimal exertion for the last few days.  

With a current hemoglobin his oncologist and instructed him to come to the 

hospital for admission for transfusion.





7/24: Patient is seen today on the [facility] floor.  He is status post 2 units of 

packed RBCs and repeat lab work reveals hemoglobin of 7.3, WBC 0.9, platelet 

count 18.  Electrolytes normal, BUN 32 and creatinine 0.89.  Calcium 7.9, total 

bilirubin 1.5, AST 42, ALT 40, alkaline phosphatase 66.  Temperature max 100.0, 

heart rate 76, blood pressure 115/66, pulse ox 96% on room air.  Consult in 

place for oncology.  COVID-19 is pending.





7/25: Hemoglobin currently is at 7.1, platelet count of 16, double basic count 

of 0.7 oncology thinks that he has myelodysplasia,patient wants to stay another 

day just in case he needs another blood products, as it's difficult for him to 

be transported back and forth with the wife who has been on a wheelchair, 

patient has chronic cough from smoking, denies any feverhowever T-max is 100.0 

within the past 24 hours, Covid testc negative,chest x-ray shows no acute 

pulmonary disease, has clearing of pulmonary edema and pleural fluid compared to

old exam,urinalyses WBC of 2,blood cultures would be sent secondary to the 

fever, consult Dr. Hernandez for neutropenic fever, unknown primary at this 

time,start patient on cefotaxime,  Levaquin, we will obtain high resolution CT 

of the chest, CT of the abdomen fromJuly 2, 2020 was reviewed , CT June 28, 2 cm

stellate mass right upper lobe, 4 x 2 cm hypodensity posterior spleen no 

hydronephrosis no pulmonary has seen the patient, consult with Dr. Tabitha peres





7/26: Patient is being transfused currently for hemoglobin of 6.0, along with pl

atelet transfusion, both are irritated, platelet is down to 9, currently on IV 

cefepime with Jovanni, Dr. Chaparro is following, CAT scan of the chest shows multiple 

pulmonary infiltrates including a spiculated lesion 16 x 10 x 13 mm right upper 

lobe, 1.5 cm noncalcified subpleural nodular density posterior segment right 

upper lobe, 7 mm noncalcified nodule right lower lobe, 2.3, dictation spiculated

left lower lobe, posterior basal segment, suspected inflammatory changes, Dr. Mckeon pulmonary has been consulted, with recommendations for follow-up CT in 3 

months, sputum culture sent today patient still has cough no hemoptysis, no 

conversational dyspnea, no active wheezing, T-max of 98.2, blood pressure is 

between 90-98 systolic pressures, nonlabored breathing pulse ox room air 98%





7/27: Patient has been seen by Dr. Mckeon with recommendations for 3 month 

follow-up for repeat CAT scan.  Patient has been continued on vancomycin, and 

cefepime by Dr. Hernandez.  We will discontinue the Levaquin that was added by 

oncology for prophylaxis.  Patient does have a temperature max of 102.6, heart 

rate 79, blood pressure 95/53, pulse ox 97% on room air.  Repeat blood work will

be ordered for today.  Patient has remained pancytopenic over the weekend.





7/28: Yesterday's blood work revealed a hemoglobin of 7, WBC 0.5, platelet count

9 and patient received 1 unit of platelets and oncology started Zarxio.  Repeat 

blood work for today reveals WBC of 0.5, hemoglobin 6.7, platelet count 23.  BUN

30 creatinine 1.25.  Blood sugar 105.  C-reactive protein is 209.5.  Patient 

denies any new complaints.  No chest pain or shortness of breath.  No cough.  

Temperature max is 102.8 at 7 PM, heart rate 72, blood pressure 88/37, pulse ox 

96% on room air. Pulmonary medicine does not plan for bronchoscopies at this 

time.  Dr. Hernandez has changed and biotics to Zosyn.





7/29: Patient is sleeping at the time of evaluation.  He wakes easily to verbal 

stimuli.  Temperature maximum 100.6, heart rate 80, blood pressure 102/52, pulse

ox 98% on room air.  Patient has had diarrhea and C. difficile toxin is 

negative.  Legionella is negative.  Lab work yesterday revealed hemoglobin of 

6.7, W BC 0.5 and platelets of 23.  He received a unit of packed RBCs last 

evening.  Repeat lab work this morning reveals WBC of 0.6, hemoglobin 6.6, 

platelet count 17.  Patient is continued on Zosyn.  He is on salt and soda 

mouthwash for oral lesions.  He has also been started on Zarxio.





7/30: Repeat blood work reveals WBC 0.7, hemoglobin 7.7, platelet count 12.  He 

is now status post 5 units of packed RBCs and 2 units of platelets.  Dr. Hernandez 

started voriconazole IV yesterday.  Patient is complaining of clear sputum 

production.  He has a history of smoking for greater than 65 years.  Oncology is

anticipating that his counts will stabilize and recover in the next several 

days.  Once recovered, would like bronchoscopy or biopsy of spiculated lesion 

found on CT of the chest.  Temperature max 102.6.  Heart rate 72, blood pressure

100/59, pulse ox 95% on room air.





7/31: Patient is seen today in follow-up.  He denies having any chest pain or s

hortness of breath.  He is having loose stool daily.  He is eating well.  He 

denies any nausea or vomiting.  His temperature maximum 103.1, heart rate 97, 

blood pressure 118/65, pulse ox 94% on 3 L nasal cannula.  Blood culture was 

obtained last night and patient received IV Tylenol last night.  He is currently

on Zyvox added to Zosyn and Voriconazole. Beta-1,3-D-glucan and serum 

galactomannan are pendind.  Repeat blood work reveals WBC 0.5, hemoglobin 7.7, 

platelet count 11.  Potassium 3.2, BUN 26 and creatinine 1.42.  Calcium 7.5.  

Blood cultures from July 24 and July 25 are showing no growth.  Sputum culture 

finalized with contaminated oral pravin.  Stool culture is in process.





8/1: Patient continues to have fever.  Tylenol was given last night.  Additional

blood cultures were ordered yesterday.  He is complaining of diarrhea previous 

C. diff culture was negative.  Patient is complaining of a tightness to his 

abdominal area.  Blood pressure 92/42, pulse 84, respirations 20, pulse ox 92% 

on 3 L of nasal cannula.  WBC 0.6, hemoglobin 7.3, platelets 13, BUN 34, 

creatinine 2.23





8/2: Patient has multiple bouts of watery yellow diarrhea.  Patient has been up 

most the day and night due to the diarrhea.  Previous C. diff was negative.  

Patient continues to complain of tenderness to his abdominal area.  WCC 0.6, 

Hemoglobin 6.7, platelets 29, BUN 43, creatinine 2.88





8/3: The patient developed mental status changes over the weekend and is 

somewhat confused this morning.  He is unable to swallow any oral medications.  

He is complaining of dizziness.  He is audibly wheezing with moist wet sounds.  

He has received 1 dose of IV Lasix 20 mg this morning we will repeat a 40 mg IV 

push now.  Contacted patient's wife on the phone and gave her an update 

regarding patient's condition and recommend that she come into the hospital.  

The patient is most appropriate for comfort care at this point.  He has 

worsening renal function with a BUN of 53 and creatinine 3.55.  WBC is 0.6, 

hemoglobin 7.8, platelet count 102.  Calcium 6.3.  Lactic acid 1.5.  Magnesium 

1.3.  Stool culture is yeast species.  Most recent blood culture is no growth at

72 hours.  Temperature 100.9, heart rate 127, blood pressure 81/51, pulse ox 93%

on 2 L nasal cannula, respiratory rate 36.  Patient started on sublingual 

morphine and sublingual lorazepam.





Review of Systems


CONSTITUTIONAL: Thin, no acute respiratory distress.  Denies chills.  Documented

fever.


EYES: No icterus sclerae, no conjunctivitis.


EARS, NOSE, MOUTH, THROAT, and FACE: No sore throat, lymphadenopathy, carotid 

bruits or deformity.


RESPIRATORY: Positive shortness of breath cough with sputum production positive 

wheezes.


CARDIOVASCULAR: No CP, Palpitation, PND, Orthopnea, or angina.


GASTROINTESTINAL: Reports diarrhea and slight dyspepsia


GENITOURINARY: Negative for Hematuria or UTI, no kidney stones.


INTEGUMENT/BREAST: Negative for any muscular injury with mild osteoarthritis..


HEMATOLOGIC/LYMPHATIC: History of myelodysplasia severe anemia thrombocytopenia 

and neutropenia.


MUSCULOSKELTAL: Negative for Myalgia or arthralgia.


NEURLOGICAL: Reports mental status change, Sz or syncope, blurred vision dizzin

ess or abnormality..


BEHAVIORAL/PSYCH: Negative.


ENDOCRINE: Negative.





Physical Examination


General Appearance: Alert, cooperative, no distress, appears stated age.  

Cachectic appearing 70-year-old  male.  Patient in bed and appears to 

be in respiratory distress.


Neck HEENT: Supple, no lymphadenopathy, no thyroid enlargement, no carotid 

bruits.


Lungs: Bilateral rhonchi, bilateral wheezing.  Accessory muscle usage.  

Tachypneic.


Chest Wall: Decrease expansion with deep inspiration no tenderness and no 

deformity was found on exam, no costochondral pain.


Heart: Regular rate and rhythm, S1, S2, tachycardia with systolic murmur.


Back: Symmetric, no curvature, ROM normal, no CVA tenderness.


Abdomen: Soft positive bowel sound slight discomfort in the mid abdominal area 

with mild organomegaly with multiple bruises no rebound or rigidity.


Extremities: Extremities trace edema with multiple bruises positive mild 

ecchymosis as well.


Pulses: 2+ and symmetric.


Skin: Skin color, texture, tugor normal, no rashes or lesions.


Neurologic: Alert oriented to person and place.  








Assessment and Plan


1 acute symptomatic anemia.  Patient is status post transfusion of 6 units 

packed RBCs.  





2 severe myelodysplasia.  Oncology consult appreciated. 





3 severe febrile neutropenia, possible sepsis secondary to cavitary pneumonia.  





4.  Pancytopenia.  Patient is status post transfusion of 6 units packed RBCs and

2 unit of platelets.  





5 COPD without exacerbation.  Continue albuterol nebulizer 3 times daily.  





6 acute on chronic diastolic heart failure.  





7 recurrent pneumonia: Patient is asymptomatic at this point.  Continue inhaler 

and antibiotics.





8 multiple pulmonary infiltrates including a spiculated 16 x 10 x 30 mm 

infiltrate in the right upper lobe.  





9 severe protein calorie malnutrition with BMI of 16 





10 GI prophylaxis: pantoprazole 40 mg daily.





11 DVT prophylaxis: Patient will stay on Venodyne boots and knee-high HAKAN hose.





12 COVID-19 infection not present.





Metabolic encephalopathy as well as worsening renal failure with acute kidney 

injury on top of chronic kidney disease stage III, continue neutropenia.  

Discussed with patient's wife need for comfort care.  She is agreeable to have 

meeting with hospice.  She wants patient to be comfortable.  Patient may qualify

for GIP status.  Patient started on sublingual morphine and lorazepam.  All oral

medications are on hold and patient made nothing by mouth status.  Tylenol 

suppository for fever.  Plan to transition patient to [facility] status once 

arrangements are completed.





CODE STATUS: NO code.





Discharge plan: [facility].





Impression and plan of care have been directed as dictated by the signing 

physician.  Brittney Bennett nurse practitioner acting as scribe for signing 

physician.





Objective





- Vital Signs


Vital signs: 


                                   Vital Signs











Temp  98.8 F   08/03/20 07:18


 


Pulse  108 H  08/03/20 08:00


 


Resp  36 H  08/03/20 07:18


 


BP  124/60   08/03/20 07:18


 


Pulse Ox  93 L  08/03/20 05:25








                                 Intake & Output











 08/02/20 08/03/20 08/03/20





 18:59 06:59 18:59


 


Intake Total 3000 900 


 


Output Total 400  


 


Balance 2600 900 


 


Weight 55.792 kg  


 


Intake:   


 


  Intake, IV Titration 1300 900 





  Amount   


 


    Dextrose 5%-0.45% NaCl 1,  900 





    000 ml @ 75 mls/hr IV .   





    [id number] FARIHA Rx#:[id number]   


 


    Piperacillin-Tazobactam 3 100  





    .375 gm In Sodium   





    Chloride 0.9% 100 ml @ 25   





    mls/hr IVPB Q12HR FARIHA Rx   





    #:[id number]   


 


    Sodium Chloride 0.9% 1, 1200  





    000 ml @ 75 mls/hr IV .   





    [id number] FARIHA Rx#:[id number]   


 


  Oral 1080  


 


  Blood Product 620  


 


    Rc Irr As1  Unit 310  





    [id number]   


 


Output:   


 


  Urine 400  


 


Other:   


 


  Voiding Method Bedside Commode Bedside Commode Urinal





 Urinal Urinal Incontinent


 


  # Voids 4 1 


 


  # Bowel Movements 1 1 














- Labs


CBC & Chem 7: 


                                 08/03/20 06:47





                                 08/03/20 06:47


Labs: 


                  Abnormal Lab Results - Last 24 Hours (Table)











  08/02/20 08/02/20 08/03/20 Range/Units





  07:12 09:37 06:47 


 


WBC    0.6 L*  (3.8-10.6)  k/uL


 


RBC    2.42 L  (4.30-5.90)  m/uL


 


Hgb    7.8 L  (13.0-17.5)  gm/dL


 


Hct    25.2 L  (39.0-53.0)  %


 


MCV    104.1 H  (80.0-100.0)  fL


 


RDW    22.1 H  (11.5-15.5)  %


 


Plt Count    102 L D  (150-450)  k/uL


 


Chloride  108 H    ()  mmol/L


 


Carbon Dioxide  21 L    (22-30)  mmol/L


 


BUN  43 H    (9-20)  mg/dL


 


Creatinine  2.88 H    (0.66-1.25)  mg/dL


 


Glucose  55 L    (74-99)  mg/dL


 


Calcium  6.5 L    (8.4-10.2)  mg/dL


 


Magnesium  1.3 L    (1.6-2.3)  mg/dL


 


C-Reactive Protein  559.2 H    (<10.0)  mg/L


 


Total Protein  5.8 L    (6.3-8.2)  g/dL


 


Albumin  2.3 L    (3.5-5.0)  g/dL


 


Crossmatch   See Detail   














  08/03/20 Range/Units





  06:47 


 


WBC   (3.8-10.6)  k/uL


 


RBC   (4.30-5.90)  m/uL


 


Hgb   (13.0-17.5)  gm/dL


 


Hct   (39.0-53.0)  %


 


MCV   (80.0-100.0)  fL


 


RDW   (11.5-15.5)  %


 


Plt Count   (150-450)  k/uL


 


Chloride  113 H  ()  mmol/L


 


Carbon Dioxide  17 L  (22-30)  mmol/L


 


BUN  53 H  (9-20)  mg/dL


 


Creatinine  3.55 H  (0.66-1.25)  mg/dL


 


Glucose  72 L  (74-99)  mg/dL


 


Calcium  6.3 L*  (8.4-10.2)  mg/dL


 


Magnesium  1.3 L  (1.6-2.3)  mg/dL


 


C-Reactive Protein   (<10.0)  mg/L


 


Total Protein  6.0 L  (6.3-8.2)  g/dL


 


Albumin  2.3 L  (3.5-5.0)  g/dL


 


Crossmatch   








                      Microbiology - Last 24 Hours (Table)











 07/29/20 00:40 Stool Culture - Final





 Stool    Yeast species


 


 07/31/20 07:41 Blood Culture - Preliminary





 Blood    No Growth after 48 hours


 


 07/31/20 07:41 Blood Culture - Preliminary





 Blood    No Growth after 48 hours

## 2020-08-04 NOTE — CDI
Documentation Clarification Form



Date: 08/04/2020 06:30:23 AM

From: Carlene Garcia

Phone: If you have a question about this query, please contact Donna Ortega 
 at [phone] between 8am and 5pm.

MRN: [id number]

Admit Date: 07/24/2020 10:48:00 AM

Patient Name: Omega Newberry

Visit Number: [id number]

Discharge Date:  08/03/2020 05:14:00 PM

ATTENTION: The Clinical Documentation Specialists (CDI) and [facility] Coding Staff 
appreciate your assistance in clarifying documentation. Please respond to the 
clarification below the line at the bottom and electronically sign. The CDI & 
[facility] Coding staff will review the response and follow-up if needed. Please note: 
Queries are made part of the Legal Health Record. If you have any questions, 
please contact the author of this message via ITS.

Dr. Yogesh Mendoza



The patient presented with pancytopenia secondary to chemotherary for MDS.  Dr. Rodriguez documents possible sepsis secondary to cavitary pneumonia in 8/2 PN.  
Please clarify if patient had sepsis per documentation

History/Risk Factors: MDS, pancytopenia, recurrent pneumonia

WBC       1.3 

Vitals signs on admission: 98.3F,   75 bpm,    18,    101/65,     98% RA

Other Clinical Indicators: fever

Treatment: antibiotics

ID Consult: concern for Aspirgillus

Antibiotics:Zosyn and Zyvox



In your professional opinion, please clarify if these findings signify one of 
the following conditions, whether the condition is POA, and cause, if known:

Condition

   Sepsis ruled out

   SIRS, without underlying infectious process

   Sepsis

   XX Severe Sepsis 

   Septic Shock

   Other, please specify  _____________

   Unable to determine



Present on Admission

   Yes

   No



SIRS Criteria (2 or more of the following may indicate SIRS):

-Temperature   < 96.8F (36C) or > 101.0F (38.3C)

-Heart Rate   > 90 bpm

-Respiratory Rate   > 20 breaths/min or PaCO2 < 32 mmHg

-White Blood Cell Count   > 12,000 or < 4,000 cells/mm3 or > 10% bands

-Lactate   >2.0 mmol/L (>4.0 is equivalent to septic shock)



__________________________________________________

MTDD

## 2020-08-06 NOTE — P.DS
Providers


Date of admission: 


20 10:48





Expected date of discharge: 20


Attending physician: 


Yogesh Mendoza





Consults: 





                                        





20 13:13


Consult Physician Urgent 


   Consulting Provider: Shad Callahan


   Consult Reason/Comments: Anemia


   Do you want consulting provider notified?: Yes





20 15:20


Consult Physician Routine 


   Consulting Provider: Neisha Hernandez


   Consult Reason/Comments: neutrop[enic fever


   Do you want consulting provider notified?: Yes





20 15:33


Consult Physician Routine 


   Consulting Provider: Natali Lu


   Consult Reason/Comments: LUNG MASS, fuo, NEUTROPENIC FEVER


   Do you want consulting provider notified?: Yes











Primary care physician: 


[facility] Course: 








70-year-old male one of Dr. Luu's patient with past medical history of 

anemia, gout, tobacco use, daily alcohol use and myelodysplasia who has been set

 pancytopenic for the last 2 month was started on chemotherapy with hematology 

recently was hospitalized for extended period of time for severe pancytopenia 

and severe neutropenia did not recover.  Patient apparently went back on 

chemotherapy after he left the hospital last time.  Today found to have severe 

anemia with hemoglobin of 5.1 with severe neutropenia white blood cell 1.3 and 

thrombocytopenia with platelets 28,000 only.  Patient brought to the [facility] emergency department where was seen and evaluated has been 

very weak has not been able to ambulate and walk and has been having worsening 

shortness of breath with dyspnea with minimal exertion for the last few days.  

With a current hemoglobin his oncologist and instructed him to come to the 

hospital for admission for transfusion.





: Patient is seen today on the MedSurg floor.  He is status post 2 units of 

packed RBCs and repeat lab work reveals hemoglobin of 7.3, WBC 0.9, platelet 

count 18.  Electrolytes normal, BUN 32 and creatinine 0.89.  Calcium 7.9, total 

bilirubin 1.5, AST 42, ALT 40, alkaline phosphatase 66.  Temperature max 100.0, 

heart rate 76, blood pressure 115/66, pulse ox 96% on room air.  Consult in 

place for oncology.  COVID-19 is pending.





: Hemoglobin currently is at 7.1, platelet count of 16, double basic count 

of 0.7 oncology thinks that he has myelodysplasia,patient wants to stay another 

day just in case he needs another blood products, as it's difficult for him to 

be transported back and forth with the wife who has been on a wheelchair, 

patient has chronic cough from smoking, denies any feverhowever T-max is 100.0 

within the past 24 hours, Covid testc negative,chest x-ray shows no acute pulm

onary disease, has clearing of pulmonary edema and pleural fluid compared to old

 exam,urinalyses WBC of 2,blood cultures would be sent secondary to the fever, 

consult Dr. Hernandez for neutropenic fever, unknown primary at this time,start 

patient on cefotaxime,  Levaquin, we will obtain high resolution CT of the 

chest, CT of the abdomen from2020 was reviewed , CT , 2 cm 

stellate mass right upper lobe, 4 x 2 cm hypodensity posterior spleen no 

hydronephrosis no pulmonary has seen the patient, consult with Dr. Lu 

pulmonary





: Patient is being transfused currently for hemoglobin of 6.0, along with 

platelet transfusion, both are irritated, platelet is down to 9, currently on IV

 cefepime with Jovanni, Dr. Chaparro is following, CAT scan of the chest shows multiple

 pulmonary infiltrates including a spiculated lesion 16 x 10 x 13 mm right upper

 lobe, 1.5 cm noncalcified subpleural nodular density posterior segment right 

upper lobe, 7 mm noncalcified nodule right lower lobe, 2.3, dictation spiculated

 left lower lobe, posterior basal segment, suspected inflammatory changes, Dr. Mckeon pulmonary has been consulted, with recommendations for follow-up CT in 3 

months, sputum culture sent today patient still has cough no hemoptysis, no 

conversational dyspnea, no active wheezing, T-max of 98.2, blood pressure is 

between 90-98 systolic pressures, nonlabored breathing pulse ox room air 98%





: Patient has been seen by Dr. Mckeon with recommendations for 3 month 

follow-up for repeat CAT scan.  Patient has been continued on vancomycin, and 

cefepime by Dr. Hernandez.  We will discontinue the Levaquin that was added by 

oncology for prophylaxis.  Patient does have a temperature max of 102.6, heart 

rate 79, blood pressure 95/53, pulse ox 97% on room air.  Repeat blood work will

 be ordered for today.  Patient has remained pancytopenic over the weekend.





: Yesterday's blood work revealed a hemoglobin of 7, WBC 0.5, platelet count

 9 and patient received 1 unit of platelets and oncology started Zarxio.  Repeat

 blood work for today reveals WBC of 0.5, hemoglobin 6.7, platelet count 23.  

BUN 30 creatinine 1.25.  Blood sugar 105.  C-reactive protein is 209.5.  Patient

 denies any new complaints.  No chest pain or shortness of breath.  No cough.  

Temperature max is 102.8 at 7 PM, heart rate 72, blood pressure 88/37, pulse ox 

96% on room air. Pulmonary medicine does not plan for bronchoscopies at this 

time.  Dr. Hernandez has changed and biotics to Zosyn.





: Patient is sleeping at the time of evaluation.  He wakes easily to verbal 

stimuli.  Temperature maximum 100.6, heart rate 80, blood pressure 102/52, pulse

 ox 98% on room air.  Patient has had diarrhea and C. difficile toxin is 

negative.  Legionella is negative.  Lab work yesterday revealed hemoglobin of 

6.7, W BC 0.5 and platelets of 23.  He received a unit of packed RBCs last 

evening.  Repeat lab work this morning reveals WBC of 0.6, hemoglobin 6.6, 

platelet count 17.  Patient is continued on Zosyn.  He is on salt and soda 

mouthwash for oral lesions.  He has also been started on Zarxio.





: Repeat blood work reveals WBC 0.7, hemoglobin 7.7, platelet count 12.  He 

is now status post 5 units of packed RBCs and 2 units of platelets.  Dr. Hernandez 

started voriconazole IV yesterday.  Patient is complaining of clear sputum 

production.  He has a history of smoking for greater than 65 years.  Oncology is

 anticipating that his counts will stabilize and recover in the next several 

days.  Once recovered, would like bronchoscopy or biopsy of spiculated lesion 

found on CT of the chest.  Temperature max 102.6.  Heart rate 72, blood pressure

 100/59, pulse ox 95% on room air.





: Patient is seen today in follow-up.  He denies having any chest pain or 

shortness of breath.  He is having loose stool daily.  He is eating well.  He 

denies any nausea or vomiting.  His temperature maximum 103.1, heart rate 97, 

blood pressure 118/65, pulse ox 94% on 3 L nasal cannula.  Blood culture was 

obtained last night and patient received IV Tylenol last night.  He is currently

 on Zyvox added to Zosyn and Voriconazole. Beta-1,3-D-glucan and serum 

galactomannan are pendind.  Repeat blood work reveals WBC 0.5, hemoglobin 7.7, 

platelet count 11.  Potassium 3.2, BUN 26 and creatinine 1.42.  Calcium 7.5.  

Blood cultures from  and  are showing no growth.  Sputum culture 

finalized with contaminated oral pravin.  Stool culture is in process.





: Patient continues to have fever.  Tylenol was given last night.  Additional

 blood cultures were ordered yesterday.  He is complaining of diarrhea previous 

C. diff culture was negative.  Patient is complaining of a tightness to his 

abdominal area.  Blood pressure 92/42, pulse 84, respirations 20, pulse ox 92% 

on 3 L of nasal cannula.  WBC 0.6, hemoglobin 7.3, platelets 13, BUN 34, 

creatinine 2.23





: Patient has multiple bouts of watery yellow diarrhea.  Patient has been up 

most the day and night due to the diarrhea.  Previous C. diff was negative.  

Patient continues to complain of tenderness to his abdominal area.  WCC 0.6, 

Hemoglobin 6.7, platelets 29, BUN 43, creatinine 2.88





8/3: The patient developed mental status changes over the weekend and is 

somewhat confused this morning.  He is unable to swallow any oral medications.  

He is complaining of dizziness.  He is audibly wheezing with moist wet sounds.  

He has received 1 dose of IV Lasix 20 mg this morning we will repeat a 40 mg IV 

push now.  Contacted patient's wife on the phone and gave her an update regardi

ng patient's condition and recommend that she come into the hospital.  The 

patient is most appropriate for comfort care at this point.  He has worsening 

renal function with a BUN of 53 and creatinine 3.55.  WBC is 0.6, hemoglobin 

7.8, platelet count 102.  Calcium 6.3.  Lactic acid 1.5.  Magnesium 1.3.  Stool 

culture is yeast species.  Most recent blood culture is no growth at 72 hours.  

Temperature 100.9, heart rate 127, blood pressure 81/51, pulse ox 93% on 2 L 

nasal cannula, respiratory rate 36.  Patient started on sublingual morphine and 

sublingual lorazepam.





Patient  on the evening of August 3.  Please see nursing documentation 

for details.








Assessment and Plan


1 acute symptomatic anemia. 


2 severe myelodysplasia.  Pulmonary cause of death.


3 severe febrile neutropenia, possible sepsis secondary to cavitary pneumonia.  


4.  Pancytopenia.  


5 COPD without exacerbation. 


6 acute on chronic diastolic heart failure.  


7 recurrent pneumonia: Patient is asymptomatic at this point.  


8 multiple pulmonary infiltrates including a spiculated 16 x 10 x 30 mm 

infiltrate in the right upper lobe.  


9 severe protein calorie malnutrition with BMI of 16 


10 COVID-19 infection not present.


Metabolic encephalopathy as well as worsening renal failure with acute kidney 

injury on top of chronic kidney disease stage III, continued neutropenia. 





Impression and plan of care have been directed as dictated by the signing 

physician.  Brittney Bennett nurse practitioner acting as scribe for signing 

physician.








Plan - Discharge Summary


Discharge Rx Participant: No


New Discharge Prescriptions: 


No Action


   Acyclovir 400 mg PO TID #90 tablet


   Fluconazole [Diflucan] 100 mg PO DAILY #30 tab


   Acetaminophen Tab [Tylenol] 650 mg PO Q6HR PRN  tab


     PRN Reason: Mild Pain Or Fever > 100.5


   Multivitamins, Thera [Multivitamin (formulary)] 1 tab PO DAILY


   Dicyclomine [Bentyl] 20 mg PO QID #120 tab


   Potassium Chloride ER [K-Dur 20] 20 meq PO DAILY #30 tab


   Furosemide [Lasix] 40 mg PO DAILY #30 tablet


   Albuterol Inhaler [Ventolin Hfa Inhaler] 2 puff INHALATION RT-TID #1 inhaler


Discharge Medication List





Acyclovir 400 mg PO TID #90 tablet 20 [Rx]


Fluconazole [Diflucan] 100 mg PO DAILY #30 tab 20 [Rx]


Acetaminophen Tab [Tylenol] 650 mg PO Q6HR PRN  tab 20 [Rx]


Multivitamins, Thera [Multivitamin (formulary)] 1 tab PO DAILY 20 

[History]


Albuterol Inhaler [Ventolin Hfa Inhaler] 2 puff INHALATION RT-TID #1 inhaler 

20 [Rx]


Dicyclomine [Bentyl] 20 mg PO QID #120 tab 20 [Rx]


Furosemide [Lasix] 40 mg PO DAILY #30 tablet 20 [Rx]


Potassium Chloride ER [K-Dur 20] 20 meq PO DAILY #30 tab 20 [Rx]








Follow up Appointment(s)/Referral(s): 


N[facility], [NON-STAFF] - 1 Week


Garcia Luu DO [Primary Care Provider] - 1-2 days


Discharge Disposition: DISCH TO HOSPICE MED FACILTY





- Preliminary Cause of Death


Preliminary Cause of Death: severe myelodysplasia

## 2020-08-13 NOTE — CDI
Documentation Clarification Form



Date: 08/13/2020 01:06:35 PM

From: Carlene Garcia

Phone: If you have a question about this query, please contact Donna Ortega, 
 at [phone] between 8am and 5pm.

MRN: [id number]

Admit Date: 08/09/2020 11:28:00 AM

Patient Name: Lesli Romero

Visit Number: [id number]

Discharge Date:  08/12/2020 05:53:00 PM





ATTENTION: The Clinical Documentation Specialists (CDI) and [facility] Coding Staff 
appreciate your assistance in clarifying documentation. Please respond to the 
clarification below the line at the bottom and electronically sign. The CDI & 
[facility] Coding staff will review the response and follow-up if needed. Please note: 
Queries are made part of the Legal Health Record. If you have any questions, 
please contact the author of this message via ITS.



Dr. Yogesh Mendoza





Per consult and PN 8/10 and 8/12 "BMI of 20, monitor nutritional losses.  
Patient also has dysphagia.  Please clarify the clinical significance of 
patient's BMI of 20 and need to monitor nutritional losses.



History/Risk Factors:  Patient with fx. femur.  dysphagia

Clinical Indicators: BMI 20

Treatment:   Monitoring nutritional losses



Please clarify the clinical significance of patient's BMI of 20 and nutritional 
monitoring.

                                                                                
                                     



Other (please specify diagnosis) __Dysphagia from Medical Condition Could be CA 
refused by the Family to do any investigation____________________



Unable to determine







___________________________________________________________________________

MTDD

## 2020-08-13 NOTE — CDI
Documentation Clarification Form



Date: 08/04/2020 05:51:00 AM

From: Carlene Garcia

Phone: :

If you have a question about this query, please contact Donna Ortega, Coding 
Manager at [phone] between 8am and 5pm.

MRN: [id number]

Admit Date: 07/24/2020 10:48:00 AM

Patient Name: Omega Newberry

Visit Number: [id number]

Discharge Date:  08/03/2020 05:14:00 PM





ATTENTION: The Clinical Documentation Specialists (CDI) and [facility] Coding Staff 
appreciate your assistance in clarifying documentation. Please respond to the 
clarification below the line at the bottom and electronically sign. The CDI & 
[facility] Coding staff will review the response and follow-up if needed. Please note: 
Queries are made part of the Legal Health Record. If you have any questions, 
please contact the author of this message via ITS.



Dr. Yogesh Mendoza







Conflicting documentation has been found in the medical record:  Documentation 
of chronic diastolic CHF throughout chart.  8/3 PN documents acute and chronic 
diastolic CHF.  Please clarify if patient had acute and chronic diastolic CFH



History/Risk Factors: chronic diastolic CHF  Right pleural effusion

Treatment:  Furosemide



In your opinion, what is the most clinically appropriate diagnosis for this 
patient?  

xxAcute and chronic diasolic CHF

Chronic diastolic CHF

Other explanation of clinical findings

Unable to determine (no explanation for clinical findings)



___________________________________________________________________________



MTDD

## 2021-11-02 NOTE — P.PN
Subjective


Progress Note Date: 08/03/20


Principal diagnosis: 





Pancytopenia status post treatment of MDS





Pt is responsive to voice and touch, he is delirious though.  Wife and grandson 

are at bedside.  





Objective





- Vital Signs


Vital signs: 


                                   Vital Signs











Temp  100.9 F H  08/03/20 13:00


 


Pulse  127 H  08/03/20 13:00


 


Resp  33 H  08/03/20 13:00


 


BP  81/51   08/03/20 13:00


 


Pulse Ox  93 L  08/03/20 05:25








                                 Intake & Output











 08/02/20 08/03/20 08/03/20





 18:59 06:59 18:59


 


Intake Total 3000 900 1000


 


Output Total 400  


 


Balance 2600 900 1000


 


Weight 55.792 kg  


 


Intake:   


 


  Intake, IV Titration [phone]





  Amount   


 


    Dextrose 5%-0.45% NaCl 1,  900 900





    000 ml @ 75 mls/hr IV .   





    [id number] FARIHA Rx#:[id number]   


 


    Piperacillin-Tazobactam 3 100  100





    .375 gm In Sodium   





    Chloride 0.9% 100 ml @ 25   





    mls/hr IVPB Q12HR FARIHA Rx   





    #:[id number]   


 


    Sodium Chloride 0.9% 1, 1200  





    000 ml @ 75 mls/hr IV .   





    [id number] FARIHA Rx#:[id number]   


 


  Oral 1080  


 


  Blood Product 620  


 


    Rc Irr As1  Unit 310  





    [id number]   


 


Output:   


 


  Urine 400  


 


Other:   


 


  Voiding Method Bedside Commode Bedside Commode Urinal





 Urinal Urinal Incontinent


 


  # Voids 4 1 1


 


  # Bowel Movements 1 1 














- Exam





Pt falls asleep, jolts himself awake, is restless then falls back to sleep





- Constitutional


General appearance: Present: thin





- Labs


CBC & Chem 7: 


                                 08/03/20 06:47





                                 08/03/20 06:47


Labs: 


                  Abnormal Lab Results - Last 24 Hours (Table)











  08/02/20 08/02/20 08/03/20 Range/Units





  07:12 09:37 06:47 


 


WBC    0.6 L*  (3.8-10.6)  k/uL


 


RBC    2.42 L  (4.30-5.90)  m/uL


 


Hgb    7.8 L  (13.0-17.5)  gm/dL


 


Hct    25.2 L  (39.0-53.0)  %


 


MCV    104.1 H  (80.0-100.0)  fL


 


RDW    22.1 H  (11.5-15.5)  %


 


Plt Count    102 L D  (150-450)  k/uL


 


Chloride     ()  mmol/L


 


Carbon Dioxide     (22-30)  mmol/L


 


BUN     (9-20)  mg/dL


 


Creatinine     (0.66-1.25)  mg/dL


 


Glucose     (74-99)  mg/dL


 


Calcium     (8.4-10.2)  mg/dL


 


Magnesium     (1.6-2.3)  mg/dL


 


Total Protein     (6.3-8.2)  g/dL


 


Albumin     (3.5-5.0)  g/dL


 


Procalcitonin  24.01 H    (0.02-0.09)  ng/mL


 


Crossmatch   See Detail   














  08/03/20 Range/Units





  06:47 


 


WBC   (3.8-10.6)  k/uL


 


RBC   (4.30-5.90)  m/uL


 


Hgb   (13.0-17.5)  gm/dL


 


Hct   (39.0-53.0)  %


 


MCV   (80.0-100.0)  fL


 


RDW   (11.5-15.5)  %


 


Plt Count   (150-450)  k/uL


 


Chloride  113 H  ()  mmol/L


 


Carbon Dioxide  17 L  (22-30)  mmol/L


 


BUN  53 H  (9-20)  mg/dL


 


Creatinine  3.55 H  (0.66-1.25)  mg/dL


 


Glucose  72 L  (74-99)  mg/dL


 


Calcium  6.3 L*  (8.4-10.2)  mg/dL


 


Magnesium  1.3 L  (1.6-2.3)  mg/dL


 


Total Protein  6.0 L  (6.3-8.2)  g/dL


 


Albumin  2.3 L  (3.5-5.0)  g/dL


 


Procalcitonin   (0.02-0.09)  ng/mL


 


Crossmatch   








                      Microbiology - Last 24 Hours (Table)











 07/31/20 07:41 Blood Culture - Preliminary





 Blood    No Growth after 72 hours


 


 07/31/20 07:41 Blood Culture - Preliminary





 Blood    No Growth after 72 hours


 


 07/29/20 00:40 Stool Culture - Final





 Stool    Yeast species














Assessment and Plan


(1) Febrile neutropenia


Current Visit: Yes   Status: Acute   Priority: High   Code(s): D70.9 - NEUTR

OPENIA, UNSPECIFIED; R50.81 - FEVER PRESENTING WITH CONDITIONS CLASSIFIED 

ELSEWHERE   SNOMED Code(s): 124373820


   





(2) Pancytopenia due to antineoplastic chemotherapy


Current Visit: Yes   Status: Chronic   Priority: Medium   Code(s): D61.810 - 

ANTINEOPLASTIC CHEMOTHERAPY INDUCED PANCYTOPENIA; T45.1X5A - ADVERSE EFFECT OF 

ANTINEOPLASTIC AND IMMUNOSUP DRUGS, INIT   SNOMED Code(s): 536751110893316


   





(3) MDS (myelodysplastic syndrome), high grade


Current Visit: No   Status: Chronic   Priority: Medium   Code(s): D46.Z - OTHER 

MYELODYSPLASTIC SYNDROMES   SNOMED Code(s): 883769797


   





(4) Thrush, oral


Current Visit: Yes   Status: Resolved   Priority: Medium   Code(s): B37.0 - CA

NDIDAL STOMATITIS   SNOMED Code(s): 69497025


   


Plan: 





IM spoke with wife and recommendation is for hospice, pending consult for the 

same.


We discussed Omega's case, what has happened and I answered some questions about 

what they could expect from hospice-I did recommend to his wife that pt be 

placed and taken home as I feel it would be too much for her to handle.  She 

verbalized understanding.  All her questions were answered to her satisfaction.





Spoke with unit RN, pt wife will be allowed to return to unit as pt is hospice 

care.  


Time with Patient: Greater than 30 (counseling and coordinating care) verbal instruction

## 2024-04-08 NOTE — PN
PROGRESS NOTE



DATE OF SERVICE:

07/26/2020



REASON FOR FOLLOWUP:

Febrile neutropenia.



INTERVAL HISTORY:

The patient is currently afebrile. The patient is breathing comfortably.  The patient

did have a congested cough but not bringing up any sputum.  No chest pain.  No nausea,

no vomiting.  No abdominal pain or diarrhea.



PHYSICAL EXAMINATION:

Blood pressure is 101/43 with a pulse of 62, temperature 97.9.  He is 94%.

General description is an elderly male lying in bed in no distress.

RESPIRATORY SYSTEM: Unlabored breathing, decreased intense breath sounds.  No wheeze.

HEART: S1, S2.  Regular rate and rhythm.

ABDOMEN:  Soft, no tenderness.



LABS:

Hemoglobin 7.4, white count 0.5, creatinine 1.22.



DIAGNOSTIC IMPRESSION AND PLAN:

Patient with febrile neutropenia, now with evidence of cavitating pneumonia on the CT.

CT will be reviewed with radiologist to see if the area can be either CT-guided biopsy

or will need a bronchoscopy for microbiological diagnosis.  We will keep the patient on

cefepime and vancomycin and follow up on the sputum culture.





MMODL / IJN: [id number] / Job#: [id number] [Negative] : Genitourinary